# Patient Record
Sex: FEMALE | Race: OTHER | HISPANIC OR LATINO | Employment: UNEMPLOYED | URBAN - METROPOLITAN AREA
[De-identification: names, ages, dates, MRNs, and addresses within clinical notes are randomized per-mention and may not be internally consistent; named-entity substitution may affect disease eponyms.]

---
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
## 2020-01-29 ENCOUNTER — HOSPITAL ENCOUNTER (EMERGENCY)
Facility: HOSPITAL | Age: 20
Discharge: HOME | End: 2020-01-29
Attending: EMERGENCY MEDICINE | Admitting: EMERGENCY MEDICINE
Payer: COMMERCIAL

## 2020-01-29 ENCOUNTER — APPOINTMENT (EMERGENCY)
Dept: CT IMAGING | Facility: HOSPITAL | Age: 20
End: 2020-01-29
Payer: COMMERCIAL

## 2020-01-29 VITALS
DIASTOLIC BLOOD PRESSURE: 66 MMHG | TEMPERATURE: 97 F | SYSTOLIC BLOOD PRESSURE: 123 MMHG | RESPIRATION RATE: 16 BRPM | OXYGEN SATURATION: 99 % | HEART RATE: 119 BPM

## 2020-01-29 DIAGNOSIS — S02.5XXA TOOTH FRACTURE: ICD-10-CM

## 2020-01-29 DIAGNOSIS — S09.90XA INJURY OF HEAD, INITIAL ENCOUNTER: ICD-10-CM
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
## 2020-01-29 PROCEDURE — 90715 TDAP VACCINE 7 YRS/> IM: CPT | Performed by: EMERGENCY MEDICINE

## 2020-01-29 PROCEDURE — 72125 CT NECK SPINE W/O DYE: CPT

## 2020-01-29 PROCEDURE — 70486 CT MAXILLOFACIAL W/O DYE: CPT

## 2020-01-29 PROCEDURE — 99282 EMERGENCY DEPT VISIT SF MDM: CPT | Performed by: EMERGENCY MEDICINE

## 2020-01-29 PROCEDURE — 90471 IMMUNIZATION ADMIN: CPT

## 2020-01-29 PROCEDURE — 99284 EMERGENCY DEPT VISIT MOD MDM: CPT

## 2020-01-29 PROCEDURE — 70450 CT HEAD/BRAIN W/O DYE: CPT

## 2020-01-29 RX ORDER — PANTOPRAZOLE SODIUM 40 MG/1
40 TABLET, DELAYED RELEASE ORAL DAILY
COMMUNITY
Start: 2019-10-11 | End: 2022-02-02 | Stop reason: HOSPADM

## 2020-01-29 RX ADMIN — TETANUS TOXOID, REDUCED DIPHTHERIA TOXOID AND ACELLULAR PERTUSSIS VACCINE, ADSORBED 0.5 ML: 5; 2.5; 8; 8; 2.5 SUSPENSION INTRAMUSCULAR at 12:25

## 2020-01-29 NOTE — ED NOTES
Patient states positive pregnancy test 4 days ago  Informed patient of risks for a CT scan  Patient states "Nicholas Man a hacer un aborto  Eso es porque no me importa si lo hacen " Patient again informed of risks  Provider informed and Gabby Lindo RN  Translation (I am going to get an    That is why I don't care if they do it "      Mari Kong  20 2541

## 2020-01-29 NOTE — ED NOTES
Pt outside of room ambulatory with no assistance   Pt asking for a phone Moshe Saul  01/29/20 [phone]
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
## 2020-01-29 NOTE — ED NOTES
Patient unable to give urine sample  Per provider patient okay to go up for CT        [place]  01/29/20 1808

## 2020-01-29 NOTE — ED NOTES
Patient removed c-collar  Informed patient of risks   States "It bothers me "     Echo Feliciano  01/29/20 0733

## 2020-02-22 ENCOUNTER — HOSPITAL ENCOUNTER (EMERGENCY)
Facility: HOSPITAL | Age: 20
Discharge: HOME | End: 2020-02-22
Attending: EMERGENCY MEDICINE | Admitting: EMERGENCY MEDICINE
Payer: COMMERCIAL

## 2020-02-22 VITALS
HEART RATE: 84 BPM | SYSTOLIC BLOOD PRESSURE: 105 MMHG | WEIGHT: 158.73 LBS | DIASTOLIC BLOOD PRESSURE: 55 MMHG | RESPIRATION RATE: 18 BRPM | TEMPERATURE: 97.7 F | OXYGEN SATURATION: 100 %

## 2020-02-22 DIAGNOSIS — R10.33 PERIUMBILICAL ABDOMINAL PAIN: Primary | ICD-10-CM

## 2020-02-22 DIAGNOSIS — R11.2 NAUSEA & VOMITING: ICD-10-CM

## 2020-02-22 LAB
BACTERIA UR QL AUTO: ABNORMAL /HPF
BILIRUB UR QL STRIP: NEGATIVE
CLARITY UR: CLEAR
COLOR UR: YELLOW
EXT PREG TEST URINE: NORMAL
EXT. CONTROL ED NAV: NORMAL
GLUCOSE UR STRIP-MCNC: NEGATIVE MG/DL
HGB UR QL STRIP.AUTO: ABNORMAL
KETONES UR STRIP-MCNC: NEGATIVE MG/DL
LEUKOCYTE ESTERASE UR QL STRIP: NEGATIVE
MUCOUS THREADS UR QL AUTO: ABNORMAL
NITRITE UR QL STRIP: NEGATIVE
NON-SQ EPI CELLS URNS QL MICRO: ABNORMAL /HPF
PH UR STRIP.AUTO: 7 [PH] (ref 4.5–8)
PROT UR STRIP-MCNC: ABNORMAL MG/DL
RBC #/AREA URNS AUTO: ABNORMAL /HPF
SP GR UR STRIP.AUTO: 1.02 (ref 1–1.03)
UROBILINOGEN UR QL STRIP.AUTO: 1 E.U./DL
WBC #/AREA URNS AUTO: ABNORMAL /HPF

## 2020-02-22 PROCEDURE — 96372 THER/PROPH/DIAG INJ SC/IM: CPT

## 2020-02-22 PROCEDURE — 81001 URINALYSIS AUTO W/SCOPE: CPT

## 2020-02-22 PROCEDURE — 99283 EMERGENCY DEPT VISIT LOW MDM: CPT

## 2020-02-22 PROCEDURE — 99284 EMERGENCY DEPT VISIT MOD MDM: CPT | Performed by: EMERGENCY MEDICINE

## 2020-02-22 PROCEDURE — 87086 URINE CULTURE/COLONY COUNT: CPT | Performed by: EMERGENCY MEDICINE

## 2020-02-22 PROCEDURE — 87147 CULTURE TYPE IMMUNOLOGIC: CPT | Performed by: EMERGENCY MEDICINE

## 2020-02-22 PROCEDURE — 81003 URINALYSIS AUTO W/O SCOPE: CPT

## 2020-02-22 PROCEDURE — 81025 URINE PREGNANCY TEST: CPT | Performed by: EMERGENCY MEDICINE

## 2020-02-22 RX ORDER — ONDANSETRON 4 MG/1
4 TABLET, ORALLY DISINTEGRATING ORAL EVERY 8 HOURS PRN
Qty: 20 TABLET | Refills: 0 | Status: SHIPPED | OUTPATIENT
Start: 2020-02-22 | End: 2022-02-02 | Stop reason: HOSPADM

## 2020-02-22 RX ORDER — KETOROLAC TROMETHAMINE 30 MG/ML
15 INJECTION, SOLUTION INTRAMUSCULAR; INTRAVENOUS ONCE
Status: COMPLETED | OUTPATIENT
Start: 2020-02-22 | End: 2020-02-22

## 2020-02-22 RX ORDER — HYOSCYAMINE SULFATE 0.125 MG
0.12 TABLET ORAL EVERY 4 HOURS PRN
Qty: 30 TABLET | Refills: 0 | Status: SHIPPED | OUTPATIENT
Start: 2020-02-22

## 2020-02-22 RX ORDER — ONDANSETRON 4 MG/1
4 TABLET, ORALLY DISINTEGRATING ORAL ONCE
Status: COMPLETED | OUTPATIENT
Start: 2020-02-22 | End: 2020-02-22

## 2020-02-22 RX ADMIN — ONDANSETRON 4 MG: 4 TABLET, ORALLY DISINTEGRATING ORAL at 22:54

## 2020-02-22 RX ADMIN — KETOROLAC TROMETHAMINE 15 MG: 30 INJECTION, SOLUTION INTRAMUSCULAR at 22:54

## 2020-02-23 NOTE — ED NOTES
Pt  Stated that she has 10 out of 10 pain in her abdomen, is nauseous, and is asking for pain meds  However, pt  Appears to be in no distress, and was laughing with visitor in room        Aarti Max  02/22/20 0978

## 2020-02-23 NOTE — ED PROVIDER NOTES
History  Chief Complaint   Patient presents with    Stress     "i have two full time jobs, to much stress", reports weakness worsening today and "i cant stop throwing up", also reports mid abdominal pain    Vomiting     Pt is a 21year old female with a PMH of HIV presenting with abdominal pain and vomiting  Pt states she works two jobs and "has too much stress"  States over the past day she has had periumbilical/epigastric stabbing abdominal pain that is intermittent and non-radiating  Associated vomiting but denies diarrhea, urinary symptoms, vaginal bleeding or discharge, back or flank pain  Denies fevers, lightheadedness, dizziness, chest pain, SOB  History of cholecystectomy  No sick contact or recent travel  Prior to Admission Medications   Prescriptions Last Dose Informant Patient Reported? Taking?   bictegravir-emtricitab-tenofovir alafenamide (BIKTARVY) -25 MG tablet Not Taking at Unknown time  Yes No   Sig: Take 1 tablet by mouth daily   elvitegravir-cobicistat-emtricitabine-tenofovir alafenamide (GENVOYA) tablet Not Taking at Unknown time  Yes No   Sig: Take 1 tablet by mouth daily    pantoprazole (PROTONIX) 40 mg tablet Not Taking at Unknown time  Yes No   Sig: Take 40 mg by mouth daily      Facility-Administered Medications: None       Past Medical History:   Diagnosis Date    [facility] ([facility] )        Past Surgical History:   Procedure Laterality Date     SECTION      CHOLECYSTECTOMY         History reviewed  No pertinent family history  I have reviewed and agree with the history as documented  Social History     Tobacco Use    Smoking status: Never Smoker    Smokeless tobacco: Never Used   Substance Use Topics    Alcohol use: No    Drug use: Yes     Types: Marijuana       Review of Systems   Constitutional: Negative  HENT: Negative  Respiratory: Negative  Cardiovascular: Negative      Gastrointestinal: Positive for abdominal pain, nausea and vomiting  Negative for abdominal distention, blood in stool, constipation and diarrhea  Genitourinary: Negative  Musculoskeletal: Negative  Neurological: Negative  All other systems reviewed and are negative  Physical Exam  Physical Exam   Constitutional: She is oriented to person, place, and time  She appears well-developed and well-nourished  No distress  HENT:   Head: Normocephalic and atraumatic  Right Ear: External ear normal    Left Ear: External ear normal    Nose: Nose normal    Eyes: Conjunctivae and EOM are normal    Neck: Normal range of motion  Neck supple  Cardiovascular: Normal rate, regular rhythm, normal heart sounds and intact distal pulses  Pulmonary/Chest: Effort normal and breath sounds normal    Abdominal: Soft  Bowel sounds are normal  She exhibits no distension  There is tenderness in the epigastric area and periumbilical area  There is no rigidity, no rebound and no guarding  Musculoskeletal: Normal range of motion  Neurological: She is alert and oriented to person, place, and time  Skin: Skin is warm and dry  She is not diaphoretic         Vital Signs  ED Triage Vitals [02/22/20 2211]   Temperature Pulse Respirations Blood Pressure SpO2   97 7 °F (36 5 °C) (!) 114 18 105/55 100 %      Temp src Heart Rate Source Patient Position - Orthostatic VS BP Location FiO2 (%)   -- Monitor Sitting Right arm --      Pain Score       4           Vitals:    02/22/20 2211 02/22/20 2313   BP: 105/55    Pulse: (!) 114 84   Patient Position - Orthostatic VS: Sitting          Visual Acuity      ED Medications  Medications   ketorolac (TORADOL) injection 15 mg (15 mg Intramuscular Given 2/22/20 2254)   ondansetron (ZOFRAN-ODT) dispersible tablet 4 mg (4 mg Oral Given 2/22/20 2254)       Diagnostic Studies  Results Reviewed     Procedure Component Value Units Date/Time    Urine culture [[id number]]     Lab Status:  No result Specimen:  Urine     Urine Microscopic [[id number]]  (Abnormal) Collected:  02/22/20 2245    Lab Status:  Final result Specimen:  Urine, Clean Catch Updated:  02/22/20 2309     RBC, UA 10-20 /hpf      WBC, UA 1-2 /hpf      Epithelial Cells Occasional /hpf      Bacteria, UA Occasional /hpf      MUCUS THREADS Occasional    POCT pregnancy, urine [[id number]]  (Normal) Resulted:  02/22/20 2246    Lab Status:  Final result Updated:  02/22/20 2249     EXT PREG TEST UR (Ref: Negative) Negative (-)     Control Valid    POCT urinalysis dipstick [[id number]]  (Abnormal) Resulted:  02/22/20 2246    Lab Status:  Final result Specimen:  Urine Updated:  02/22/20 2248    Urine Macroscopic, POC [[id number]]  (Abnormal) Collected:  02/22/20 2245    Lab Status:  Final result Specimen:  Urine Updated:  02/22/20 2246     Color, UA Yellow     Clarity, UA Clear     pH, UA 7 0     Leukocytes, UA Negative     Nitrite, UA Negative     Protein, UA 30 (1+) mg/dl      Glucose, UA Negative mg/dl      Ketones, UA Negative mg/dl      Urobilinogen, UA 1 0 E U /dl      Bilirubin, UA Negative     Blood, UA Moderate     Specific Gravity, UA 1 025    Narrative:       CLINITEK RESULT                 No orders to display              Procedures  Procedures         ED Course                               MDM  Number of Diagnoses or Management Options  Nausea & vomiting:   Periumbilical abdominal pain:   Diagnosis management comments: Based on history and exam, I do not suspect acute abdomen  Given return precautions for symptoms including appendicitis precautions  Pt had relief with medications  Follow up as needed          Disposition  Final diagnoses:   Periumbilical abdominal pain   Nausea & vomiting     Time reflects when diagnosis was documented in both MDM as applicable and the Disposition within this note     Time User Action Codes Description Comment    2/22/2020 11:12 PM Mohinder Mandel Add [H25 77] Periumbilical abdominal pain     2/22/2020 11:12 PM Mildred RICE Add [R11 2] Nausea & vomiting       ED Disposition     ED Disposition Condition Date/Time Comment    Discharge Good Sat Feb 22, 2020 11:12 PM Anatoliy Leo discharge to home/self care  Follow-up Information     Follow up With Specialties Details Why Contact Info Additional [address] Schedule an appointment as soon as possible for a visit  As needed [address], [address] [facility] [address]  [address], [address], [address] [place], [place], [address]          Discharge Medication List as of 2/22/2020 11:13 PM      START taking these medications    Details   hyoscyamine (ANASPAZ,LEVSIN) 0 125 MG tablet Take 1 tablet (0 125 mg total) by mouth every 4 (four) hours as needed for cramping, Starting Sat 2/22/2020, Print      ondansetron (ZOFRAN-ODT) 4 mg disintegrating tablet Take 1 tablet (4 mg total) by mouth every 8 (eight) hours as needed for nausea, Starting Sat 2/22/2020, Print         CONTINUE these medications which have NOT CHANGED    Details   bictegravir-emtricitab-tenofovir alafenamide (BIKTARVY) -25 MG tablet Take 1 tablet by mouth daily, Starting Fri 10/11/2019, Historical Med      elvitegravir-cobicistat-emtricitabine-tenofovir alafenamide (GENVOYA) tablet Take 1 tablet by mouth daily , Starting Mon 12/31/2018, Historical Med      pantoprazole (PROTONIX) 40 mg tablet Take 40 mg by mouth daily, Starting Fri 10/11/2019, Until Sat 10/10/2020, Historical Med           No discharge procedures on file      PDMP Review     None          ED Provider  Electronically Signed by           Jayashree Clark PA-C  02/23/20 9791

## 2020-02-25 LAB
BACTERIA UR CULT: ABNORMAL
BACTERIA UR CULT: ABNORMAL

## 2020-08-01 ENCOUNTER — HOSPITAL ENCOUNTER (EMERGENCY)
Facility: HOSPITAL | Age: 20
Discharge: LEFT BEFORE DISCHARGE | End: 2020-08-01
Attending: EMERGENCY MEDICINE | Admitting: EMERGENCY MEDICINE
Payer: COMMERCIAL

## 2020-08-01 VITALS
OXYGEN SATURATION: 99 % | WEIGHT: 184.75 LBS | SYSTOLIC BLOOD PRESSURE: 128 MMHG | RESPIRATION RATE: 20 BRPM | TEMPERATURE: 100.3 F | HEART RATE: 109 BPM | DIASTOLIC BLOOD PRESSURE: 76 MMHG
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
## 2020-08-01 LAB
EXT PREG TEST URINE: NEGATIVE
EXT. CONTROL ED NAV: NORMAL

## 2020-08-01 PROCEDURE — 99284 EMERGENCY DEPT VISIT MOD MDM: CPT

## 2020-08-01 PROCEDURE — 81025 URINE PREGNANCY TEST: CPT | Performed by: PHYSICIAN ASSISTANT

## 2020-08-01 PROCEDURE — 99282 EMERGENCY DEPT VISIT SF MDM: CPT | Performed by: PHYSICIAN ASSISTANT

## 2020-08-01 RX ORDER — AMOXICILLIN AND CLAVULANATE POTASSIUM 875; 125 MG/1; MG/1
1 TABLET, FILM COATED ORAL ONCE
Status: DISCONTINUED | OUTPATIENT
Start: 2020-08-01 | End: 2020-08-02 | Stop reason: HOSPADM
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
## 2021-01-02 ENCOUNTER — APPOINTMENT (EMERGENCY)
Dept: ULTRASOUND IMAGING | Facility: HOSPITAL | Age: 21
End: 2021-01-02
Payer: COMMERCIAL

## 2021-01-02 ENCOUNTER — HOSPITAL ENCOUNTER (EMERGENCY)
Facility: HOSPITAL | Age: 21
Discharge: HOME | End: 2021-01-03
Attending: EMERGENCY MEDICINE | Admitting: EMERGENCY MEDICINE
Payer: COMMERCIAL

## 2021-01-02 DIAGNOSIS — O20.0 THREATENED ABORTION: Primary | ICD-10-CM

## 2021-01-02 LAB
ABO GROUP BLD: NORMAL
ALBUMIN SERPL BCP-MCNC: 4.8 G/DL (ref 3–5.2)
ALP SERPL-CCNC: 71 U/L (ref 43–122)
ALT SERPL W P-5'-P-CCNC: 21 U/L (ref 9–52)
ANION GAP SERPL CALCULATED.3IONS-SCNC: 13 MMOL/L (ref 5–14)
AST SERPL W P-5'-P-CCNC: 31 U/L (ref 14–36)
B-HCG SERPL-ACNC: 629.6 MIU/ML
BACTERIA UR QL AUTO: ABNORMAL /HPF
BASOPHILS # BLD AUTO: 0 THOUSANDS/ΜL (ref 0–0.1)
BASOPHILS NFR BLD AUTO: 0 % (ref 0–1)
BILIRUB SERPL-MCNC: 0.5 MG/DL
BILIRUB UR QL STRIP: NEGATIVE
BUN SERPL-MCNC: 9 MG/DL (ref 5–25)
CALCIUM SERPL-MCNC: 9.2 MG/DL (ref 8.4–10.2)
CHLORIDE SERPL-SCNC: 106 MMOL/L (ref 97–108)
CLARITY UR: ABNORMAL
CO2 SERPL-SCNC: 26 MMOL/L (ref 22–30)
COLOR UR: YELLOW
CREAT SERPL-MCNC: 0.75 MG/DL (ref 0.6–1.2)
EOSINOPHIL # BLD AUTO: 0.1 THOUSAND/ΜL (ref 0–0.4)
EOSINOPHIL NFR BLD AUTO: 1 % (ref 0–6)
ERYTHROCYTE [DISTWIDTH] IN BLOOD BY AUTOMATED COUNT: 13.5 %
EXT PREG TEST URINE: POSITIVE
EXT. CONTROL ED NAV: NORMAL
GFR SERPL CREATININE-BSD FRML MDRD: 115 ML/MIN/1.73SQ M
GLUCOSE SERPL-MCNC: 119 MG/DL (ref 70–99)
GLUCOSE UR STRIP-MCNC: NEGATIVE MG/DL
HCT VFR BLD AUTO: 36.7 % (ref 36–46)
HGB BLD-MCNC: 12.1 G/DL (ref 12–16)
HGB UR QL STRIP.AUTO: 250
KETONES UR STRIP-MCNC: NEGATIVE MG/DL
LEUKOCYTE ESTERASE UR QL STRIP: 25
LYMPHOCYTES # BLD AUTO: 3 THOUSANDS/ΜL (ref 0.5–4)
LYMPHOCYTES NFR BLD AUTO: 27 % (ref 25–45)
MCH RBC QN AUTO: 27.3 PG (ref 26–34)
MCHC RBC AUTO-ENTMCNC: 33.1 G/DL (ref 31–36)
MCV RBC AUTO: 83 FL (ref 80–100)
MONOCYTES # BLD AUTO: 0.5 THOUSAND/ΜL (ref 0.2–0.9)
MONOCYTES NFR BLD AUTO: 4 % (ref 1–10)
MUCOUS THREADS UR QL AUTO: ABNORMAL
NEUTROPHILS # BLD AUTO: 7.6 THOUSANDS/ΜL (ref 1.8–7.8)
NEUTS SEG NFR BLD AUTO: 68 % (ref 45–65)
NITRITE UR QL STRIP: NEGATIVE
NON-SQ EPI CELLS URNS QL MICRO: ABNORMAL /HPF
PH UR STRIP.AUTO: 5 [PH]
PLATELET # BLD AUTO: 335 THOUSANDS/UL (ref 150–450)
PMV BLD AUTO: 7.9 FL (ref 8.9–12.7)
POTASSIUM SERPL-SCNC: 3.8 MMOL/L (ref 3.6–5)
PROT SERPL-MCNC: 8.8 G/DL (ref 5.9–8.4)
PROT UR STRIP-MCNC: ABNORMAL MG/DL
RBC # BLD AUTO: 4.43 MILLION/UL (ref 4–5.2)
RBC #/AREA URNS AUTO: ABNORMAL /HPF
RH BLD: POSITIVE
SODIUM SERPL-SCNC: 145 MMOL/L (ref 137–147)
SP GR UR STRIP.AUTO: 1.03 (ref 1–1.04)
UROBILINOGEN UA: NEGATIVE MG/DL
WBC # BLD AUTO: 11.2 THOUSAND/UL (ref 4.5–11)
WBC #/AREA URNS AUTO: ABNORMAL /HPF

## 2021-01-02 PROCEDURE — 81025 URINE PREGNANCY TEST: CPT | Performed by: PHYSICIAN ASSISTANT

## 2021-01-02 PROCEDURE — 80053 COMPREHEN METABOLIC PANEL: CPT | Performed by: PHYSICIAN ASSISTANT

## 2021-01-02 PROCEDURE — 86900 BLOOD TYPING SEROLOGIC ABO: CPT | Performed by: PHYSICIAN ASSISTANT

## 2021-01-02 PROCEDURE — 96374 THER/PROPH/DIAG INJ IV PUSH: CPT

## 2021-01-02 PROCEDURE — 99284 EMERGENCY DEPT VISIT MOD MDM: CPT

## 2021-01-02 PROCEDURE — 85025 COMPLETE CBC W/AUTO DIFF WBC: CPT | Performed by: PHYSICIAN ASSISTANT

## 2021-01-02 PROCEDURE — 99284 EMERGENCY DEPT VISIT MOD MDM: CPT | Performed by: PHYSICIAN ASSISTANT

## 2021-01-02 PROCEDURE — 81001 URINALYSIS AUTO W/SCOPE: CPT | Performed by: PHYSICIAN ASSISTANT

## 2021-01-02 PROCEDURE — 86901 BLOOD TYPING SEROLOGIC RH(D): CPT | Performed by: PHYSICIAN ASSISTANT

## 2021-01-02 PROCEDURE — 76815 OB US LIMITED FETUS(S): CPT

## 2021-01-02 PROCEDURE — 84702 CHORIONIC GONADOTROPIN TEST: CPT | Performed by: PHYSICIAN ASSISTANT

## 2021-01-02 PROCEDURE — 36415 COLL VENOUS BLD VENIPUNCTURE: CPT | Performed by: PHYSICIAN ASSISTANT

## 2021-01-02 PROCEDURE — 96361 HYDRATE IV INFUSION ADD-ON: CPT

## 2021-01-02 RX ORDER — METOCLOPRAMIDE HYDROCHLORIDE 5 MG/ML
5 INJECTION INTRAMUSCULAR; INTRAVENOUS ONCE
Status: COMPLETED | OUTPATIENT
Start: 2021-01-02 | End: 2021-01-02

## 2021-01-02 RX ORDER — ACETAMINOPHEN 325 MG/1
650 TABLET ORAL ONCE
Status: COMPLETED | OUTPATIENT
Start: 2021-01-02 | End: 2021-01-02

## 2021-01-02 RX ADMIN — ACETAMINOPHEN 650 MG: 325 TABLET ORAL at 21:01

## 2021-01-02 RX ADMIN — SODIUM CHLORIDE 1000 ML: 0.9 INJECTION, SOLUTION INTRAVENOUS at 20:46

## 2021-01-02 RX ADMIN — METOCLOPRAMIDE 5 MG: 5 INJECTION, SOLUTION INTRAMUSCULAR; INTRAVENOUS at 21:02

## 2021-01-03 VITALS
HEART RATE: 94 BPM | SYSTOLIC BLOOD PRESSURE: 116 MMHG | RESPIRATION RATE: 18 BRPM | DIASTOLIC BLOOD PRESSURE: 69 MMHG | TEMPERATURE: 97.4 F | WEIGHT: 176.13 LBS | OXYGEN SATURATION: 100 %

## 2021-01-03 RX ORDER — ACETAMINOPHEN 500 MG
500 TABLET ORAL EVERY 6 HOURS PRN
Qty: 30 TABLET | Refills: 0 | Status: SHIPPED | OUTPATIENT
Start: 2021-01-03 | End: 2022-02-02 | Stop reason: HOSPADM

## 2021-01-03 NOTE — ED NOTES
Pt rang call bell to alert this RN that the IV catheter became dislodge  Pt will remain without an IV till after Ultrasound  Provider aware        Val Negro, RN  01/02/21 0477

## 2021-01-03 NOTE — ED NOTES
Translation via Ipad was used and the pt denied having any more questions       Melissa Tanner RN  01/02/21 4488

## 2021-01-03 NOTE — DISCHARGE INSTRUCTIONS
Use Tylenol for pain  Return for new worsening complaints  Follow-up with the lab in 2 days for repeat blood work  You do not have to return to the emergency department for repeat blood work  Follow-up with OBGYN

## 2021-04-15 ENCOUNTER — HOSPITAL ENCOUNTER (EMERGENCY)
Facility: HOSPITAL | Age: 21
Discharge: HOME | End: 2021-04-15
Attending: EMERGENCY MEDICINE | Admitting: EMERGENCY MEDICINE
Payer: COMMERCIAL

## 2021-04-15 VITALS
WEIGHT: 161 LBS | HEART RATE: 87 BPM | TEMPERATURE: 98.7 F | RESPIRATION RATE: 18 BRPM | SYSTOLIC BLOOD PRESSURE: 118 MMHG | DIASTOLIC BLOOD PRESSURE: 68 MMHG | OXYGEN SATURATION: 100 %

## 2021-04-15 DIAGNOSIS — N39.0 UTI (URINARY TRACT INFECTION): Primary | ICD-10-CM

## 2021-04-15 DIAGNOSIS — Z32.02 NEGATIVE PREGNANCY TEST: ICD-10-CM

## 2021-04-15 LAB
ALBUMIN SERPL BCP-MCNC: 4.8 G/DL (ref 3–5.2)
ALP SERPL-CCNC: 82 U/L (ref 43–122)
ALT SERPL W P-5'-P-CCNC: 22 U/L
ANION GAP SERPL CALCULATED.3IONS-SCNC: 10 MMOL/L (ref 5–14)
AST SERPL W P-5'-P-CCNC: 30 U/L (ref 14–36)
BACTERIA UR QL AUTO: ABNORMAL /HPF
BASOPHILS # BLD AUTO: 0.1 THOUSANDS/ΜL (ref 0–0.1)
BASOPHILS NFR BLD AUTO: 1 % (ref 0–1)
BILIRUB SERPL-MCNC: 0.56 MG/DL
BILIRUB UR QL STRIP: NEGATIVE
BUN SERPL-MCNC: 11 MG/DL (ref 5–25)
CALCIUM SERPL-MCNC: 10.1 MG/DL (ref 8.4–10.2)
CHLORIDE SERPL-SCNC: 101 MMOL/L (ref 97–108)
CLARITY UR: ABNORMAL
CO2 SERPL-SCNC: 30 MMOL/L (ref 22–30)
COLOR UR: YELLOW
CREAT SERPL-MCNC: 0.71 MG/DL (ref 0.6–1.2)
EOSINOPHIL # BLD AUTO: 0 THOUSAND/ΜL (ref 0–0.4)
EOSINOPHIL NFR BLD AUTO: 0 % (ref 0–6)
ERYTHROCYTE [DISTWIDTH] IN BLOOD BY AUTOMATED COUNT: 14.2 %
EXT PREG TEST URINE: NEGATIVE
EXT. CONTROL ED NAV: NORMAL
GFR SERPL CREATININE-BSD FRML MDRD: 122 ML/MIN/1.73SQ M
GLUCOSE SERPL-MCNC: 94 MG/DL (ref 70–99)
GLUCOSE UR STRIP-MCNC: NEGATIVE MG/DL
HCT VFR BLD AUTO: 41.9 % (ref 36–46)
HGB BLD-MCNC: 14 G/DL (ref 12–16)
HGB UR QL STRIP.AUTO: 150
KETONES UR STRIP-MCNC: NEGATIVE MG/DL
LEUKOCYTE ESTERASE UR QL STRIP: 100
LIPASE SERPL-CCNC: 109 U/L (ref 23–300)
LYMPHOCYTES # BLD AUTO: 3.4 THOUSANDS/ΜL (ref 0.5–4)
LYMPHOCYTES NFR BLD AUTO: 37 % (ref 25–45)
MCH RBC QN AUTO: 27.2 PG (ref 26–34)
MCHC RBC AUTO-ENTMCNC: 33.3 G/DL (ref 31–36)
MCV RBC AUTO: 82 FL (ref 80–100)
MONOCYTES # BLD AUTO: 0.5 THOUSAND/ΜL (ref 0.2–0.9)
MONOCYTES NFR BLD AUTO: 5 % (ref 1–10)
MUCOUS THREADS UR QL AUTO: ABNORMAL
NEUTROPHILS # BLD AUTO: 5.2 THOUSANDS/ΜL (ref 1.8–7.8)
NEUTS SEG NFR BLD AUTO: 57 % (ref 45–65)
NITRITE UR QL STRIP: NEGATIVE
NON-SQ EPI CELLS URNS QL MICRO: ABNORMAL /HPF
PH UR STRIP.AUTO: 6 [PH]
PLATELET # BLD AUTO: 330 THOUSANDS/UL (ref 150–450)
PMV BLD AUTO: 8.5 FL (ref 8.9–12.7)
POTASSIUM SERPL-SCNC: 3.6 MMOL/L (ref 3.6–5)
PROT SERPL-MCNC: 8.8 G/DL (ref 5.9–8.4)
PROT UR STRIP-MCNC: ABNORMAL MG/DL
RBC # BLD AUTO: 5.13 MILLION/UL (ref 4–5.2)
RBC #/AREA URNS AUTO: ABNORMAL /HPF
SODIUM SERPL-SCNC: 141 MMOL/L (ref 137–147)
SP GR UR STRIP.AUTO: 1.02 (ref 1–1.04)
UROBILINOGEN UA: NEGATIVE MG/DL
WBC # BLD AUTO: 9.2 THOUSAND/UL (ref 4.5–11)
WBC #/AREA URNS AUTO: ABNORMAL /HPF

## 2021-04-15 PROCEDURE — 36415 COLL VENOUS BLD VENIPUNCTURE: CPT | Performed by: PHYSICIAN ASSISTANT

## 2021-04-15 PROCEDURE — 87077 CULTURE AEROBIC IDENTIFY: CPT | Performed by: PHYSICIAN ASSISTANT

## 2021-04-15 PROCEDURE — 81003 URINALYSIS AUTO W/O SCOPE: CPT | Performed by: PHYSICIAN ASSISTANT

## 2021-04-15 PROCEDURE — 81025 URINE PREGNANCY TEST: CPT | Performed by: PHYSICIAN ASSISTANT

## 2021-04-15 PROCEDURE — 99284 EMERGENCY DEPT VISIT MOD MDM: CPT

## 2021-04-15 PROCEDURE — 87147 CULTURE TYPE IMMUNOLOGIC: CPT | Performed by: PHYSICIAN ASSISTANT

## 2021-04-15 PROCEDURE — 96374 THER/PROPH/DIAG INJ IV PUSH: CPT

## 2021-04-15 PROCEDURE — 81001 URINALYSIS AUTO W/SCOPE: CPT | Performed by: PHYSICIAN ASSISTANT

## 2021-04-15 PROCEDURE — 99284 EMERGENCY DEPT VISIT MOD MDM: CPT | Performed by: PHYSICIAN ASSISTANT

## 2021-04-15 PROCEDURE — 80053 COMPREHEN METABOLIC PANEL: CPT | Performed by: PHYSICIAN ASSISTANT

## 2021-04-15 PROCEDURE — 83690 ASSAY OF LIPASE: CPT | Performed by: PHYSICIAN ASSISTANT

## 2021-04-15 PROCEDURE — 87186 SC STD MICRODIL/AGAR DIL: CPT | Performed by: PHYSICIAN ASSISTANT

## 2021-04-15 PROCEDURE — 96375 TX/PRO/DX INJ NEW DRUG ADDON: CPT

## 2021-04-15 PROCEDURE — 85025 COMPLETE CBC W/AUTO DIFF WBC: CPT | Performed by: PHYSICIAN ASSISTANT

## 2021-04-15 PROCEDURE — 96361 HYDRATE IV INFUSION ADD-ON: CPT

## 2021-04-15 PROCEDURE — 87086 URINE CULTURE/COLONY COUNT: CPT | Performed by: PHYSICIAN ASSISTANT

## 2021-04-15 RX ORDER — KETOROLAC TROMETHAMINE 30 MG/ML
15 INJECTION, SOLUTION INTRAMUSCULAR; INTRAVENOUS ONCE
Status: COMPLETED | OUTPATIENT
Start: 2021-04-15 | End: 2021-04-15

## 2021-04-15 RX ORDER — CEPHALEXIN 500 MG/1
500 CAPSULE ORAL EVERY 12 HOURS SCHEDULED
Qty: 14 CAPSULE | Refills: 0 | Status: SHIPPED | OUTPATIENT
Start: 2021-04-15 | End: 2021-04-22

## 2021-04-15 RX ORDER — ONDANSETRON 2 MG/ML
4 INJECTION INTRAMUSCULAR; INTRAVENOUS ONCE
Status: COMPLETED | OUTPATIENT
Start: 2021-04-15 | End: 2021-04-15

## 2021-04-15 RX ADMIN — SODIUM CHLORIDE 1000 ML: 0.9 INJECTION, SOLUTION INTRAVENOUS at 01:39

## 2021-04-15 RX ADMIN — ONDANSETRON 4 MG: 2 INJECTION INTRAMUSCULAR; INTRAVENOUS at 01:44

## 2021-04-15 RX ADMIN — KETOROLAC TROMETHAMINE 15 MG: 30 INJECTION, SOLUTION INTRAMUSCULAR; INTRAVENOUS at 01:44

## 2021-04-15 NOTE — ED PROVIDER NOTES
History  Chief Complaint   Patient presents with    Abdominal Pain     pt c/o lower abd pain with nausea x 3 days  denies taking anything for symptoms  unsure if she is pregnant      Patient presents for evaluation of lower abdominal pain x3 days  Admits to 1 episode of vomiting  Denies any urinary symptoms, vaginal discharge  States her last menstrual period was 3 weeks ago  She did have a miscarriage from last and she was seen here in 2021  She denies any concern for STD  Pain is crampy in localized to lower abdomen without radiation  No other symptoms or complaints  Prior to Admission Medications   Prescriptions Last Dose Informant Patient Reported? Taking?   acetaminophen (TYLENOL) 500 mg tablet   No No   Sig: Take 1 tablet (500 mg total) by mouth every 6 (six) hours as needed for mild pain   bictegravir-emtricitab-tenofovir alafenamide (BIKTARVY) -25 MG tablet   Yes No   Sig: Take 1 tablet by mouth daily   elvitegravir-cobicistat-emtricitabine-tenofovir alafenamide (GENVOYA) tablet   Yes No   Sig: Take 1 tablet by mouth daily    hyoscyamine (ANASPAZ,LEVSIN) 0 125 MG tablet   No No   Sig: Take 1 tablet (0 125 mg total) by mouth every 4 (four) hours as needed for cramping   ondansetron (ZOFRAN-ODT) 4 mg disintegrating tablet   No No   Sig: Take 1 tablet (4 mg total) by mouth every 8 (eight) hours as needed for nausea   pantoprazole (PROTONIX) 40 mg tablet   Yes No   Sig: Take 40 mg by mouth daily      Facility-Administered Medications: None       Past Medical History:   Diagnosis Date    HIV (human immunodeficiency virus infection) ([facility] [address] )        Past Surgical History:   Procedure Laterality Date     SECTION      CHOLECYSTECTOMY         History reviewed  No pertinent family history  I have reviewed and agree with the history as documented      E-Cigarette/Vaping    E-Cigarette Use Never User      E-Cigarette/Vaping Substances     Social History     Tobacco Use    Smoking status: Never Smoker    Smokeless tobacco: Never Used   Substance Use Topics    Alcohol use: Yes     Frequency: 4 or more times a week     Drinks per session: 10 or more     Binge frequency: Daily or almost daily     Comment: Pt reports that she drinks 3-4 bottles of Leonela per day  Pt states that the size of the bottle is 1/5    Drug use: Yes     Types: Marijuana       Review of Systems   Constitutional: Negative for chills and fever  HENT: Negative for congestion, ear pain and sore throat  Eyes: Negative for pain  Respiratory: Negative for cough and shortness of breath  Cardiovascular: Negative for chest pain  Gastrointestinal: Positive for abdominal pain, nausea and vomiting  Genitourinary: Negative for dysuria, frequency, vaginal bleeding and vaginal discharge  Musculoskeletal: Negative for back pain  Skin: Negative for rash  Neurological: Negative for dizziness and numbness  Psychiatric/Behavioral: Negative for suicidal ideas  All other systems reviewed and are negative  Physical Exam  Physical Exam  Vitals signs reviewed  Constitutional:       Appearance: She is well-developed  HENT:      Head: Normocephalic and atraumatic  Right Ear: External ear normal       Left Ear: External ear normal       Nose: Nose normal    Neck:      Musculoskeletal: Normal range of motion and neck supple  Cardiovascular:      Rate and Rhythm: Normal rate and regular rhythm  Heart sounds: Normal heart sounds  Pulmonary:      Effort: Pulmonary effort is normal       Breath sounds: Normal breath sounds  Abdominal:      General: Bowel sounds are normal       Palpations: Abdomen is soft  Tenderness: There is no abdominal tenderness  There is no guarding  Comments: Completely benign abdominal exam   Musculoskeletal: Normal range of motion  Skin:     General: Skin is warm and dry  Capillary Refill: Capillary refill takes less than 2 seconds     Neurological: Mental Status: She is alert and oriented to person, place, and time     Psychiatric:         Behavior: Behavior normal          Vital Signs  ED Triage Vitals [04/15/21 0117]   Temperature Pulse Respirations Blood Pressure SpO2   98 7 °F (37 1 °C) 98 17 127/77 100 %      Temp Source Heart Rate Source Patient Position - Orthostatic VS BP Location FiO2 (%)   Tympanic Monitor Sitting Left arm --      Pain Score       8           Vitals:    04/15/21 0117   BP: 127/77   Pulse: 98   Patient Position - Orthostatic VS: Sitting         Visual Acuity      ED Medications  Medications   sodium chloride 0 9 % bolus 1,000 mL (1,000 mL Intravenous New Bag 4/15/21 0139)   ondansetron (ZOFRAN) injection 4 mg (4 mg Intravenous Given 4/15/21 0144)   ketorolac (TORADOL) injection 15 mg (15 mg Intravenous Given 4/15/21 0144)       Diagnostic Studies  Results Reviewed     Procedure Component Value Units Date/Time    Comprehensive metabolic panel [[id number]]  (Abnormal) Collected: 04/15/21 0138    Lab Status: Final result Specimen: Blood from Arm, Right Updated: 04/15/21 0158     Sodium 141 mmol/L      Potassium 3 6 mmol/L      Chloride 101 mmol/L      CO2 30 mmol/L      ANION GAP 10 mmol/L      BUN 11 mg/dL      Creatinine 0 71 mg/dL      Glucose 94 mg/dL      Calcium 10 1 mg/dL      AST 30 U/L      ALT 22 U/L      Alkaline Phosphatase 82 U/L      Total Protein 8 8 g/dL      Albumin 4 8 g/dL      Total Bilirubin 0 56 mg/dL      eGFR 122 ml/min/1 73sq m     Narrative:      Rylee guidelines for Chronic Kidney Disease (CKD):     Stage 1 with normal or high GFR (GFR > 90 mL/min/1 73 square meters)    Stage 2 Mild CKD (GFR = 60-89 mL/min/1 73 square meters)    Stage 3A Moderate CKD (GFR = 45-59 mL/min/1 73 square meters)    Stage 3B Moderate CKD (GFR = 30-44 mL/min/1 73 square meters)    Stage 4 Severe CKD (GFR = 15-29 mL/min/1 73 square meters)    Stage 5 End Stage CKD (GFR <15 mL/min/1 73 square meters)  Note: GFR calculation is accurate only with a steady state creatinine    Lipase [[id number]]  (Normal) Collected: 04/15/21 0138    Lab Status: Final result Specimen: Blood from Arm, Right Updated: 04/15/21 0158     Lipase 109 u/L     Urine Microscopic [[id number]]  (Abnormal) Collected: 04/15/21 0138    Lab Status: Final result Specimen: Urine, Clean Catch Updated: 04/15/21 0156     RBC, UA 4-10 /hpf      WBC, UA 30-50 /hpf      Epithelial Cells Moderate /hpf      Bacteria, UA Innumerable /hpf      MUCUS THREADS Occasional    Urine culture [[id number]] Collected: 04/15/21 0138    Lab Status:  In process Specimen: Urine, Clean Catch Updated: 04/15/21 0156    CBC and differential [[id number]]  (Abnormal) Collected: 04/15/21 0138    Lab Status: Final result Specimen: Blood from Arm, Right Updated: 04/15/21 0153     WBC 9 20 Thousand/uL      RBC 5 13 Million/uL      Hemoglobin 14 0 g/dL      Hematocrit 41 9 %      MCV 82 fL      MCH 27 2 pg      MCHC 33 3 g/dL      RDW 14 2 %      MPV 8 5 fL      Platelets 241 Thousands/uL      Neutrophils Relative 57 %      Lymphocytes Relative 37 %      Monocytes Relative 5 %      Eosinophils Relative 0 %      Basophils Relative 1 %      Neutrophils Absolute 5 20 Thousands/µL      Lymphocytes Absolute 3 40 Thousands/µL      Monocytes Absolute 0 50 Thousand/µL      Eosinophils Absolute 0 00 Thousand/µL      Basophils Absolute 0 10 Thousands/µL     UA w Reflex to Microscopic w Reflex to Culture [[id number]]  (Abnormal) Collected: 04/15/21 0138    Lab Status: Final result Specimen: Urine, Clean Catch Updated: 04/15/21 0150     Color, UA Yellow     Clarity, UA Slightly Cloudy     Specific Gravity, UA 1 025     pH, UA 6 0     Leukocytes,  0     Nitrite, UA Negative     Protein, UA 15 (Trace) mg/dl      Glucose, UA Negative mg/dl      Ketones, UA Negative mg/dl      Bilirubin, UA Negative     Blood,  0     UROBILINOGEN UA Negative mg/dL     POCT pregnancy, urine [[id number]] (Normal) Resulted: 04/15/21 0139    Lab Status: Final result Updated: 04/15/21 0139     EXT PREG TEST UR (Ref: Negative) negative     Control valid                 No orders to display              Procedures  Procedures         ED Course  ED Course as of Apr 15 0206   Thu Apr 15, 2021   0141 Of note, nursing reporting to me patient admitted to only being here for pregnancy test after nurse completed POCT preg test                                SBIRT 20yo+      Most Recent Value   SBIRT (25 yo +)   In order to provide better care to our patients, we are screening all of our patients for alcohol and drug use  Would it be okay to ask you these screening questions? Yes Filed at: 04/15/2021 0120   Initial Alcohol Screen: US AUDIT-C    1  How often do you have a drink containing alcohol?  0 Filed at: 04/15/2021 0120   2  How many drinks containing alcohol do you have on a typical day you are drinking? 0 Filed at: 04/15/2021 0120   3b  FEMALE Any Age, or MALE 65+: How often do you have 4 or more drinks on one occassion? 0 Filed at: 04/15/2021 0120   Audit-C Score  0 Filed at: 04/15/2021 0120   PUMA: How many times in the past year have you    Used an illegal drug or used a prescription medication for non-medical reasons? Never Filed at: 04/15/2021 0120                    MDM  Number of Diagnoses or Management Options  Negative pregnancy test:   UTI (urinary tract infection):   Diagnosis management comments: UTI noted on UA  Negative preg  Instructed to follow up with OBGYN  Patient agreeable  Patient verbalizes understanding and agrees with plan  The management plan was discussed in detail with the patient at bedside and all questions were answered  Prior to discharge, I provided both verbal and written instructions  I discussed with the patient the signs and symptoms for which to return to the emergency department    All questions were answered and patient was comfortable with the plan of care and discharged to home  The patient agrees to return to the Emergency Department for concerns and/or progression of illness  Disposition  Final diagnoses:   UTI (urinary tract infection)   Negative pregnancy test     Time reflects when diagnosis was documented in both MDM as applicable and the Disposition within this note     Time User Action Codes Description Comment    4/15/2021  2:00 AM Lory Montoya Add [N39 0] UTI (urinary tract infection)     4/15/2021  2:01 AM Lory Montoya Add [Z32 02] Negative pregnancy test       ED Disposition     ED Disposition Condition Date/Time Comment    Discharge Stable Thu Apr 15, 2021  2:00 AM Anatoliy Leo discharge to home/self care  Follow-up Information     Follow up With Specialties Details Why Contact Info Additional [address] [facility]   [address]  [address] [place] [address], [address] [facility] [phone]  [address], [address] [place] [address] [address], [address] [place], [place], [address]          Patient's Medications   Discharge Prescriptions    CEPHALEXIN (KEFLEX) 500 MG CAPSULE    Take 1 capsule (500 mg total) by mouth every 12 (twelve) hours for 7 days       Start Date: 4/15/2021 End Date: 4/22/2021       Order Dose: 500 mg       Quantity: 14 capsule    Refills: 0     No discharge procedures on file      PDMP Review     None          ED Provider  Electronically Signed by           Agnes Gutiérrez PA-C  04/15/21 0206

## 2021-04-15 NOTE — DISCHARGE INSTRUCTIONS
Please follow up with your family doctor  If you do not have one, I have provided you with a referral  I have also provided you with a referral for OBGYN  Use medication as prescribed  Please return to the ER with any worsening symptoms

## 2021-04-15 NOTE — ED NOTES
Patient reports that her pain and nausea are gone and that she is feeling better       Annetta Isabel RN  04/15/21 1683

## 2021-04-17 LAB
BACTERIA UR CULT: ABNORMAL

## 2021-05-17 ENCOUNTER — HOSPITAL ENCOUNTER (EMERGENCY)
Facility: HOSPITAL | Age: 21
Discharge: HOME | End: 2021-05-17
Attending: EMERGENCY MEDICINE
Payer: COMMERCIAL

## 2021-05-17 VITALS
SYSTOLIC BLOOD PRESSURE: 121 MMHG | TEMPERATURE: 98.3 F | DIASTOLIC BLOOD PRESSURE: 72 MMHG | RESPIRATION RATE: 16 BRPM | OXYGEN SATURATION: 100 % | HEART RATE: 94 BPM | WEIGHT: 161 LBS

## 2021-05-17 DIAGNOSIS — J06.9 URI (UPPER RESPIRATORY INFECTION): ICD-10-CM

## 2021-05-17 DIAGNOSIS — J02.9 PHARYNGITIS: Primary | ICD-10-CM

## 2021-05-17 LAB — SARS-COV-2 RNA RESP QL NAA+PROBE: NEGATIVE

## 2021-05-17 PROCEDURE — U0005 INFEC AGEN DETEC AMPLI PROBE: HCPCS | Performed by: EMERGENCY MEDICINE

## 2021-05-17 PROCEDURE — U0003 INFECTIOUS AGENT DETECTION BY NUCLEIC ACID (DNA OR RNA); SEVERE ACUTE RESPIRATORY SYNDROME CORONAVIRUS 2 (SARS-COV-2) (CORONAVIRUS DISEASE [COVID-19]), AMPLIFIED PROBE TECHNIQUE, MAKING USE OF HIGH THROUGHPUT TECHNOLOGIES AS DESCRIBED BY CMS-2020-01-R: HCPCS | Performed by: EMERGENCY MEDICINE

## 2021-05-17 PROCEDURE — 96372 THER/PROPH/DIAG INJ SC/IM: CPT

## 2021-05-17 PROCEDURE — 99283 EMERGENCY DEPT VISIT LOW MDM: CPT

## 2021-05-17 PROCEDURE — 99284 EMERGENCY DEPT VISIT MOD MDM: CPT | Performed by: EMERGENCY MEDICINE

## 2021-05-17 RX ORDER — DEXAMETHASONE SODIUM PHOSPHATE 4 MG/ML
10 INJECTION, SOLUTION INTRA-ARTICULAR; INTRALESIONAL; INTRAMUSCULAR; INTRAVENOUS; SOFT TISSUE ONCE
Status: COMPLETED | OUTPATIENT
Start: 2021-05-17 | End: 2021-05-17

## 2021-05-17 RX ORDER — FLUTICASONE PROPIONATE 50 MCG
1 SPRAY, SUSPENSION (ML) NASAL DAILY
Qty: 16 G | Refills: 0 | Status: SHIPPED | OUTPATIENT
Start: 2021-05-17

## 2021-05-17 RX ORDER — LORATADINE 10 MG/1
10 TABLET ORAL DAILY
Qty: 20 TABLET | Refills: 0 | Status: SHIPPED | OUTPATIENT
Start: 2021-05-17

## 2021-05-17 RX ADMIN — DEXAMETHASONE SODIUM PHOSPHATE 10 MG: 4 INJECTION, SOLUTION INTRAMUSCULAR; INTRAVENOUS at 16:59

## 2021-05-17 NOTE — ED PROVIDER NOTES
History  Chief Complaint   Patient presents with    Sore Throat     x2 days       25 yo female with a history of HIV presents to the ED complaining of URI symptoms x several days  The patient reports a sore throat, rhinorrhea, nasal congestion, and a mild nonproductive cough  No earache or sinus pain/pressure  She denies fevers/chills  No chest pain or shortness of breath  No identifiable sick contacts  No other specific complaints  Prior to Admission Medications   Prescriptions Last Dose Informant Patient Reported? Taking?   acetaminophen (TYLENOL) 500 mg tablet   No No   Sig: Take 1 tablet (500 mg total) by mouth every 6 (six) hours as needed for mild pain   bictegravir-emtricitab-tenofovir alafenamide (BIKTARVY) -25 MG tablet   Yes No   Sig: Take 1 tablet by mouth daily   elvitegravir-cobicistat-emtricitabine-tenofovir alafenamide (GENVOYA) tablet   Yes No   Sig: Take 1 tablet by mouth daily    hyoscyamine (ANASPAZ,LEVSIN) 0 125 MG tablet   No No   Sig: Take 1 tablet (0 125 mg total) by mouth every 4 (four) hours as needed for cramping   ondansetron (ZOFRAN-ODT) 4 mg disintegrating tablet   No No   Sig: Take 1 tablet (4 mg total) by mouth every 8 (eight) hours as needed for nausea   pantoprazole (PROTONIX) 40 mg tablet   Yes No   Sig: Take 40 mg by mouth daily      Facility-Administered Medications: None       Past Medical History:   Diagnosis Date    HIV (human immunodeficiency virus infection) ([facility] [address] )        Past Surgical History:   Procedure Laterality Date     SECTION      CHOLECYSTECTOMY         History reviewed  No pertinent family history  I have reviewed and agree with the history as documented      E-Cigarette/Vaping    E-Cigarette Use Never User      E-Cigarette/Vaping Substances     Social History     Tobacco Use    Smoking status: Never Smoker    Smokeless tobacco: Never Used   Substance Use Topics    Alcohol use: Yes     Frequency: 4 or more times a week     Drinks per session: 10 or more     Binge frequency: Daily or almost daily     Comment: Pt reports that she drinks 3-4 bottles of Leonela per day  Pt states that the size of the bottle is 1/5    Drug use: Yes     Types: Marijuana       Review of Systems   Constitutional: Negative for chills and fever  HENT: Positive for congestion, rhinorrhea and sore throat  Eyes: Negative for visual disturbance  Respiratory: Positive for cough  Negative for shortness of breath  Cardiovascular: Negative for chest pain  Gastrointestinal: Negative for abdominal pain, diarrhea, nausea and vomiting  Endocrine: Negative for cold intolerance and heat intolerance  Genitourinary: Negative for dysuria and frequency  Musculoskeletal: Negative for back pain  Skin: Negative for rash  Allergic/Immunologic: Negative for immunocompromised state  Neurological: Negative for dizziness, weakness, light-headedness, numbness and headaches  Hematological: Negative for adenopathy  Psychiatric/Behavioral: Negative for self-injury  Physical Exam  Physical Exam  Constitutional:       General: She is not in acute distress  Appearance: She is well-developed  HENT:      Head: Normocephalic and atraumatic  Nose:      Right Sinus: No maxillary sinus tenderness or frontal sinus tenderness  Left Sinus: No maxillary sinus tenderness or frontal sinus tenderness  Mouth/Throat:      Pharynx: Oropharynx is clear  No pharyngeal swelling or posterior oropharyngeal erythema  Tonsils: No tonsillar exudate  Eyes:      Pupils: Pupils are equal, round, and reactive to light  Neck:      Musculoskeletal: Normal range of motion and neck supple  Cardiovascular:      Rate and Rhythm: Normal rate and regular rhythm  Pulmonary:      Effort: Pulmonary effort is normal       Breath sounds: Normal breath sounds  Abdominal:      General: There is no distension  Palpations: Abdomen is soft  Tenderness:  There is no abdominal tenderness  Musculoskeletal: Normal range of motion  Lymphadenopathy:      Cervical: No cervical adenopathy  Skin:     General: Skin is warm and dry  Neurological:      Mental Status: She is alert and oriented to person, place, and time  Vital Signs  ED Triage Vitals [05/17/21 1612]   Temperature Pulse Respirations Blood Pressure SpO2   98 3 °F (36 8 °C) 94 16 121/72 100 %      Temp Source Heart Rate Source Patient Position - Orthostatic VS BP Location FiO2 (%)   Tympanic Monitor Sitting Left arm --      Pain Score       --           Vitals:    05/17/21 1612   BP: 121/72   Pulse: 94   Patient Position - Orthostatic VS: Sitting         Visual Acuity      ED Medications  Medications   dexamethasone (DECADRON) injection 10 mg (10 mg Intramuscular Given 5/17/21 1659)       Diagnostic Studies  Results Reviewed     Procedure Component Value Units Date/Time    Novel Coronavirus (Covid-19),PCR [facility] - 24 Hour Routine [[id number]]  (Normal) Collected: 05/17/21 1700    Lab Status: Final result Specimen: Nares from Nasopharyngeal Swab Updated: 05/17/21 1816     SARS-CoV-2 Negative    Narrative: The specimen collection materials, transport medium, and/or testing methodology utilized in the production of these test results have been proven to be reliable in a limited validation with an abbreviated program under the Emergency Utilization Authorization provided by the FDA  Testing reported as "Presumptive positive" will be confirmed with secondary testing to ensure result accuracy  Clinical caution and judgement should be used with the interpretation of these results with consideration of the clinical impression and other laboratory testing  Testing reported as "Positive" or "Negative" has been proven to be accurate according to standard laboratory validation requirements  All testing is performed with control materials showing appropriate reactivity at standard intervals                     No orders to display              Procedures  Procedures         ED Course                             SBIRT 20yo+      Most Recent Value   SBIRT (24 yo +)   In order to provide better care to our patients, we are screening all of our patients for alcohol and drug use  Would it be okay to ask you these screening questions? No Filed at: 05/17/2021 1636                    [facility]  Number of Diagnoses or Management Options  Pharyngitis:   URI (upper respiratory infection):   Diagnosis management comments: The patient is very well appearing with stable vital signs and a benign exam  Symptoms are most consistent with a viral URI +/- seasonal allergies  0/4 Cent[facility] Criteria on exam  Plan for supportive care with IM Decadron, a daily antihistamine, and Flonase  COVID swab sent to the lab  The patient was instructed to quarantine until the result becomes available  She is agreeable to this plan  Strict return precautions provided  Amount and/or Complexity of Data Reviewed  Clinical lab tests: ordered    Patient Progress  Patient progress: stable      Disposition  Final diagnoses:   Pharyngitis   URI (upper respiratory infection)     Time reflects when diagnosis was documented in both MDM as applicable and the Disposition within this note     Time User Action Codes Description Comment    5/17/2021  4:41 PM Nabor Berry Add [J02 9] Pharyngitis     5/17/2021  4:41 PM Bryson Billingsley Add [J06 9] URI (upper respiratory infection)       ED Disposition     ED Disposition Condition Date/Time Comment    Discharge Stable Mon May 17, 2021  4:41 PM Anatoliy Bailey discharge to home/self care              Follow-up Information     Follow up With Specialties Details Why Contact Info Additional [facility] Schedule an appointment as soon as possible for a visit   [address] [place], [address] [facility] [address]  [address] [place] [address] [address], [address] [place] [address], [address] [place], [place], [address]          Discharge Medication List as of 5/17/2021  4:42 PM      START taking these medications    Details   fluticasone (FLONASE) 50 mcg/act nasal spray 1 spray into each nostril daily, Starting Mon 5/17/2021, Print      loratadine (CLARITIN) 10 mg tablet Take 1 tablet (10 mg total) by mouth daily, Starting Mon 5/17/2021, Print         CONTINUE these medications which have NOT CHANGED    Details   acetaminophen (TYLENOL) 500 mg tablet Take 1 tablet (500 mg total) by mouth every 6 (six) hours as needed for mild pain, Starting Sun 1/3/2021, Print      bictegravir-emtricitab-tenofovir alafenamide (BIKTARVY) -25 MG tablet Take 1 tablet by mouth daily, Starting Fri 10/11/2019, Historical Med      elvitegravir-cobicistat-emtricitabine-tenofovir alafenamide (GENVOYA) tablet Take 1 tablet by mouth daily , Starting Mon 12/31/2018, Historical Med      hyoscyamine (ANASPAZ,LEVSIN) 0 125 MG tablet Take 1 tablet (0 125 mg total) by mouth every 4 (four) hours as needed for cramping, Starting Sat 2/22/2020, Print      ondansetron (ZOFRAN-ODT) 4 mg disintegrating tablet Take 1 tablet (4 mg total) by mouth every 8 (eight) hours as needed for nausea, Starting Sat 2/22/2020, Print      pantoprazole (PROTONIX) 40 mg tablet Take 40 mg by mouth daily, Starting Fri 10/11/2019, Until Sat 10/10/2020, Historical Med           No discharge procedures on file      PDMP Review     None          ED Provider  Electronically Signed by           Remedios Davis MD  05/17/21 9615

## 2021-05-28 ENCOUNTER — HOSPITAL ENCOUNTER (EMERGENCY)
Facility: HOSPITAL | Age: 21
Discharge: HOME | End: 2021-05-28
Attending: EMERGENCY MEDICINE
Payer: COMMERCIAL

## 2021-05-28 VITALS
OXYGEN SATURATION: 100 % | TEMPERATURE: 97.6 F | RESPIRATION RATE: 14 BRPM | DIASTOLIC BLOOD PRESSURE: 81 MMHG | HEART RATE: 79 BPM | SYSTOLIC BLOOD PRESSURE: 129 MMHG | WEIGHT: 155.1 LBS

## 2021-05-28 DIAGNOSIS — R11.2 NAUSEA & VOMITING: Primary | ICD-10-CM

## 2021-05-28 DIAGNOSIS — N39.0 UTI (URINARY TRACT INFECTION): ICD-10-CM

## 2021-05-28 LAB
ALBUMIN SERPL BCP-MCNC: 4.6 G/DL (ref 3–5.2)
ALP SERPL-CCNC: 71 U/L (ref 43–122)
ALT SERPL W P-5'-P-CCNC: 30 U/L
AMPHETAMINES SERPL QL SCN: NEGATIVE
ANION GAP SERPL CALCULATED.3IONS-SCNC: 12 MMOL/L (ref 5–14)
AST SERPL W P-5'-P-CCNC: 35 U/L (ref 14–36)
BACTERIA UR QL AUTO: ABNORMAL /HPF
BARBITURATES UR QL: NEGATIVE
BENZODIAZ UR QL: NEGATIVE
BILIRUB SERPL-MCNC: 0.61 MG/DL
BILIRUB UR QL STRIP: NEGATIVE
BUN SERPL-MCNC: 9 MG/DL (ref 5–25)
CALCIUM SERPL-MCNC: 9.6 MG/DL (ref 8.4–10.2)
CHLORIDE SERPL-SCNC: 105 MMOL/L (ref 97–108)
CLARITY UR: ABNORMAL
CO2 SERPL-SCNC: 27 MMOL/L (ref 22–30)
COCAINE UR QL: NEGATIVE
COLOR UR: ABNORMAL
CREAT SERPL-MCNC: 0.85 MG/DL (ref 0.6–1.2)
EOSINOPHIL # BLD AUTO: 0.09 THOUSAND/UL (ref 0–0.4)
EOSINOPHIL NFR BLD MANUAL: 1 % (ref 0–6)
ERYTHROCYTE [DISTWIDTH] IN BLOOD BY AUTOMATED COUNT: 14.3 %
EXT PREG TEST URINE: NEGATIVE
EXT. CONTROL ED NAV: NORMAL
GFR SERPL CREATININE-BSD FRML MDRD: 98 ML/MIN/1.73SQ M
GLUCOSE SERPL-MCNC: 100 MG/DL (ref 70–99)
GLUCOSE UR STRIP-MCNC: NEGATIVE MG/DL
HCT VFR BLD AUTO: 40.9 % (ref 36–46)
HGB BLD-MCNC: 13.9 G/DL (ref 12–16)
HGB UR QL STRIP.AUTO: 150
KETONES UR STRIP-MCNC: ABNORMAL MG/DL
LEUKOCYTE ESTERASE UR QL STRIP: 25
LIPASE SERPL-CCNC: 84 U/L (ref 23–300)
LYMPHOCYTES # BLD AUTO: 2.75 THOUSAND/UL (ref 0.5–4)
LYMPHOCYTES # BLD AUTO: 32 % (ref 25–45)
MCH RBC QN AUTO: 27.4 PG (ref 26–34)
MCHC RBC AUTO-ENTMCNC: 34.1 G/DL (ref 31–36)
MCV RBC AUTO: 81 FL (ref 80–100)
METHADONE UR QL: NEGATIVE
MONOCYTES # BLD AUTO: 0.17 THOUSAND/UL (ref 0.2–0.9)
MONOCYTES NFR BLD AUTO: 2 % (ref 1–10)
MUCOUS THREADS UR QL AUTO: ABNORMAL
NEUTS BAND NFR BLD MANUAL: 1 % (ref 0–8)
NEUTS SEG # BLD: 5.59 THOUSAND/UL (ref 1.8–7.8)
NEUTS SEG NFR BLD AUTO: 64 %
NITRITE UR QL STRIP: NEGATIVE
NON-SQ EPI CELLS URNS QL MICRO: ABNORMAL /HPF
OPIATES UR QL SCN: NEGATIVE
OXYCODONE+OXYMORPHONE UR QL SCN: NEGATIVE
PCP UR QL: NEGATIVE
PH UR STRIP.AUTO: 6 [PH]
PLATELET # BLD AUTO: 259 THOUSANDS/UL (ref 150–450)
PLATELET BLD QL SMEAR: ADEQUATE
PMV BLD AUTO: 8.4 FL (ref 8.9–12.7)
POTASSIUM SERPL-SCNC: 3.6 MMOL/L (ref 3.6–5)
PROT SERPL-MCNC: 8.7 G/DL (ref 5.9–8.4)
PROT UR STRIP-MCNC: ABNORMAL MG/DL
RBC # BLD AUTO: 5.08 MILLION/UL (ref 4–5.2)
RBC #/AREA URNS AUTO: ABNORMAL /HPF
RBC MORPH BLD: NORMAL
SODIUM SERPL-SCNC: 144 MMOL/L (ref 137–147)
SP GR UR STRIP.AUTO: 1.02 (ref 1–1.04)
THC UR QL: NEGATIVE
TOTAL CELLS COUNTED SPEC: 100
URINE COMMENT: ABNORMAL
UROBILINOGEN UA: 1 MG/DL
WBC # BLD AUTO: 8.6 THOUSAND/UL (ref 4.5–11)
WBC #/AREA URNS AUTO: ABNORMAL /HPF

## 2021-05-28 PROCEDURE — 85027 COMPLETE CBC AUTOMATED: CPT | Performed by: PHYSICIAN ASSISTANT

## 2021-05-28 PROCEDURE — 96361 HYDRATE IV INFUSION ADD-ON: CPT

## 2021-05-28 PROCEDURE — 96374 THER/PROPH/DIAG INJ IV PUSH: CPT

## 2021-05-28 PROCEDURE — 99284 EMERGENCY DEPT VISIT MOD MDM: CPT | Performed by: PHYSICIAN ASSISTANT

## 2021-05-28 PROCEDURE — 81001 URINALYSIS AUTO W/SCOPE: CPT | Performed by: PHYSICIAN ASSISTANT

## 2021-05-28 PROCEDURE — 80307 DRUG TEST PRSMV CHEM ANLYZR: CPT | Performed by: PHYSICIAN ASSISTANT

## 2021-05-28 PROCEDURE — 80053 COMPREHEN METABOLIC PANEL: CPT | Performed by: PHYSICIAN ASSISTANT

## 2021-05-28 PROCEDURE — 99283 EMERGENCY DEPT VISIT LOW MDM: CPT

## 2021-05-28 PROCEDURE — 85007 BL SMEAR W/DIFF WBC COUNT: CPT | Performed by: PHYSICIAN ASSISTANT

## 2021-05-28 PROCEDURE — 81025 URINE PREGNANCY TEST: CPT | Performed by: PHYSICIAN ASSISTANT

## 2021-05-28 PROCEDURE — 36415 COLL VENOUS BLD VENIPUNCTURE: CPT | Performed by: PHYSICIAN ASSISTANT

## 2021-05-28 PROCEDURE — 81003 URINALYSIS AUTO W/O SCOPE: CPT | Performed by: PHYSICIAN ASSISTANT

## 2021-05-28 PROCEDURE — 83690 ASSAY OF LIPASE: CPT | Performed by: PHYSICIAN ASSISTANT

## 2021-05-28 PROCEDURE — 96375 TX/PRO/DX INJ NEW DRUG ADDON: CPT

## 2021-05-28 RX ORDER — ONDANSETRON 4 MG/1
4 TABLET, ORALLY DISINTEGRATING ORAL EVERY 6 HOURS PRN
Qty: 20 TABLET | Refills: 0 | Status: SHIPPED | OUTPATIENT
Start: 2021-05-28 | End: 2022-02-02 | Stop reason: HOSPADM

## 2021-05-28 RX ORDER — METOCLOPRAMIDE HYDROCHLORIDE 5 MG/ML
10 INJECTION INTRAMUSCULAR; INTRAVENOUS ONCE
Status: COMPLETED | OUTPATIENT
Start: 2021-05-28 | End: 2021-05-28

## 2021-05-28 RX ORDER — NITROFURANTOIN 25; 75 MG/1; MG/1
100 CAPSULE ORAL 2 TIMES DAILY
Qty: 10 CAPSULE | Refills: 0 | Status: SHIPPED | OUTPATIENT
Start: 2021-05-28 | End: 2022-02-02 | Stop reason: HOSPADM

## 2021-05-28 RX ADMIN — METOCLOPRAMIDE 10 MG: 5 INJECTION, SOLUTION INTRAMUSCULAR; INTRAVENOUS at 09:35

## 2021-05-28 RX ADMIN — FAMOTIDINE 20 MG: 10 INJECTION INTRAVENOUS at 09:36

## 2021-05-28 RX ADMIN — SODIUM CHLORIDE 1000 ML: 0.9 INJECTION, SOLUTION INTRAVENOUS at 09:35

## 2021-05-28 NOTE — ED NOTES
Pt tolerating ice chips, ambulated to BR and back without problem, gait steady     Isaias Ordoñez, DM  05/28/21 1285

## 2021-05-28 NOTE — ED PROVIDER NOTES
History  Chief Complaint   Patient presents with    Vomiting     began 0600, pt admits to drinking yesterday  pt denies diarrhea/fevers/cough     59-year-old female with past medical history of alcohol abuse GERD, gastritis, and HIV last CD4, absolute count on 651, on 2021 presents to the ED for evaluation of nausea, vomiting, and epigastric tenderness x 3 hours  States that last night she drank 12 beers, stopped at midnight, and woke up at 6 a m  this morning to onset of symptoms  Reports over 10 episodes of NBNB vomittng  Patient states she typically drinks more than 12 beers on occasion, and typically does not wake up to nausea vomiting and epigastric tenderness  Patient states she typically drinks only on the weekend  Patient states emesis is nonbloody-non bilious  Denies any fever, chills, diaphoresis, chest pain, dyspnea, diarrhea, constipation, dysuria, hematuria and flank pain  First day of last menstrual cycle 2021  Patient denies any vaginal bleeding vaginal discharge  Patient denies any other drug use  History provided by:  Patient   used: No    Vomiting  Associated symptoms: abdominal pain    Associated symptoms: no chills, no cough, no diarrhea, no fever, no headaches, no myalgias and no sore throat        Prior to Admission Medications   Prescriptions Last Dose Informant Patient Reported?  Taking?   acetaminophen (TYLENOL) 500 mg tablet   No No   Sig: Take 1 tablet (500 mg total) by mouth every 6 (six) hours as needed for mild pain   bictegravir-emtricitab-tenofovir alafenamide (BIKTARVY) -25 MG tablet   Yes No   Sig: Take 1 tablet by mouth daily   elvitegravir-cobicistat-emtricitabine-tenofovir alafenamide (GENVOYA) tablet   Yes No   Sig: Take 1 tablet by mouth daily    fluticasone (FLONASE) 50 mcg/act nasal spray   No No   Si spray into each nostril daily   hyoscyamine (ANASPAZ,LEVSIN) 0 125 MG tablet Not Taking at Unknown time  No No   Sig: Take 1 tablet (0 125 mg total) by mouth every 4 (four) hours as needed for cramping   Patient not taking: Reported on 2021   loratadine (CLARITIN) 10 mg tablet   No No   Sig: Take 1 tablet (10 mg total) by mouth daily   ondansetron (ZOFRAN-ODT) 4 mg disintegrating tablet Not Taking at Unknown time  No No   Sig: Take 1 tablet (4 mg total) by mouth every 8 (eight) hours as needed for nausea   Patient not taking: Reported on 2021   pantoprazole (PROTONIX) 40 mg tablet   Yes No   Sig: Take 40 mg by mouth daily      Facility-Administered Medications: None       Past Medical History:   Diagnosis Date    HIV (human immunodeficiency virus infection) ([facility] [address] )        Past Surgical History:   Procedure Laterality Date     SECTION      CHOLECYSTECTOMY         History reviewed  No pertinent family history  I have reviewed and agree with the history as documented  E-Cigarette/Vaping    E-Cigarette Use Never User      E-Cigarette/Vaping Substances     Social History     Tobacco Use    Smoking status: Never Smoker    Smokeless tobacco: Never Used   Substance Use Topics    Alcohol use: Yes     Frequency: 4 or more times a week     Drinks per session: 10 or more     Binge frequency: Daily or almost daily     Comment: Pt reports that she drinks 3-4 bottles of Leonela per day  Pt states that the size of the bottle is 1/5    Drug use: Yes     Types: Marijuana       Review of Systems   Constitutional: Negative for chills, diaphoresis, fatigue and fever  HENT: Negative for congestion, rhinorrhea and sore throat  Eyes: Negative for photophobia and visual disturbance  Respiratory: Negative for cough and shortness of breath  Cardiovascular: Negative for chest pain and palpitations  Gastrointestinal: Positive for abdominal pain, nausea and vomiting  Negative for blood in stool, constipation, diarrhea and rectal pain  Genitourinary: Negative for difficulty urinating, dysuria, hematuria and urgency  Musculoskeletal: Negative for back pain, gait problem, myalgias, neck pain and neck stiffness  Skin: Negative for color change, rash and wound  Neurological: Negative for dizziness, syncope, weakness, light-headedness, numbness and headaches  Hematological: Negative for adenopathy  Psychiatric/Behavioral: Negative for behavioral problems  Physical Exam    Physical Exam  Vitals signs and nursing note reviewed  Constitutional:       General: She is not in acute distress  Appearance: Normal appearance  She is well-developed and normal weight  She is not ill-appearing, toxic-appearing or diaphoretic  HENT:      Head: Normocephalic and atraumatic  Right Ear: Tympanic membrane, ear canal and external ear normal       Left Ear: Tympanic membrane, ear canal and external ear normal       Nose: Nose normal  No congestion or rhinorrhea  Mouth/Throat:      Mouth: Mucous membranes are moist       Pharynx: Oropharynx is clear  No oropharyngeal exudate or posterior oropharyngeal erythema  Eyes:      General: No scleral icterus  Right eye: No discharge  Left eye: No discharge  Extraocular Movements: Extraocular movements intact  Conjunctiva/sclera: Conjunctivae normal       Pupils: Pupils are equal, round, and reactive to light  Neck:      Musculoskeletal: Normal range of motion  No neck rigidity or muscular tenderness  Cardiovascular:      Rate and Rhythm: Normal rate and regular rhythm  Pulses: Normal pulses  Heart sounds: Normal heart sounds  No murmur  Pulmonary:      Effort: Pulmonary effort is normal  No respiratory distress  Breath sounds: Normal breath sounds  No wheezing  Abdominal:      General: Bowel sounds are normal  There is no distension  Palpations: Abdomen is soft  There is no mass  Tenderness: There is abdominal tenderness (Mild epigastric to deep palpation)   There is no right CVA tenderness, left CVA tenderness, guarding or rebound  Hernia: No hernia is present  Musculoskeletal: Normal range of motion  General: No deformity or signs of injury  Right lower leg: No edema  Left lower leg: No edema  Lymphadenopathy:      Cervical: No cervical adenopathy  Skin:     General: Skin is warm and dry  Capillary Refill: Capillary refill takes less than 2 seconds  Coloration: Skin is not jaundiced or pale  Neurological:      Mental Status: She is alert and oriented to person, place, and time  Motor: No weakness  Gait: Gait normal    Psychiatric:         Behavior: Behavior normal          Vital Signs  ED Triage Vitals [05/28/21 0904]   Temperature Pulse Respirations Blood Pressure SpO2   97 6 °F (36 4 °C) 97 20 144/87 99 %      Temp Source Heart Rate Source Patient Position - Orthostatic VS BP Location FiO2 (%)   Tympanic Monitor Sitting Left arm --      Pain Score       --           Vitals:    05/28/21 0904 05/28/21 1124   BP: 144/87 129/81   Pulse: 97 79   Patient Position - Orthostatic VS: Sitting Lying         Visual Acuity      ED Medications  Medications   sodium chloride 0 9 % bolus 1,000 mL (0 mL Intravenous Stopped 5/28/21 1108)   metoclopramide (REGLAN) injection 10 mg (10 mg Intravenous Given 5/28/21 0935)   famotidine (PEPCID) injection 20 mg (20 mg Intravenous Given 5/28/21 0936)       Diagnostic Studies  Results Reviewed     Procedure Component Value Units Date/Time    Urine Microscopic [[id number]]  (Abnormal) Collected: 05/28/21 0933    Lab Status: Final result Specimen: Urine, Clean Catch Updated: 05/28/21 1145     RBC, UA 2-4 /hpf      WBC, UA 4-10 /hpf      Epithelial Cells Moderate /hpf      Bacteria, UA Innumerable /hpf      MUCUS THREADS Moderate     URINE COMMENT Concentrated microscopic performed on low volume urine      Manual Differential (Non Wam) [[id number]]  (Abnormal) Collected: 05/28/21 0933    Lab Status: Final result Specimen: Blood from Arm, Right Updated: 05/28/21 1138     Segmented % 64 %      Bands % 1 %      Lymphocytes % 32 %      Monocytes % 2 %      Eosinophils, % 1 %      Neutrophils Absolute 5 59 Thousand/uL      Lymphocytes Absolute 2 75 Thousand/uL      Monocytes Absolute 0 17 Thousand/uL      Eosinophils Absolute 0 09 Thousand/uL      Total Counted 100     RBC Morphology Normal     Platelet Estimate Adequate    Rapid drug screen, urine [[id number]]  (Normal) Collected: 05/28/21 0932    Lab Status: Final result Specimen: Urine, Catheter Updated: 05/28/21 1105     Amph/Meth UR Negative     Barbiturate Ur Negative     Benzodiazepine Urine Negative     Cocaine Urine Negative     Methadone Urine Negative     Opiate Urine Negative     PCP Ur Negative     THC Urine Negative     Oxycodone Urine Negative    Narrative:      FOR MEDICAL PURPOSES ONLY  IF CONFIRMATION NEEDED PLEASE CONTACT THE LAB WITHIN 5 DAYS      Drug Screen Cutoff Levels:  AMPHETAMINE/METHAMPHETAMINES  1000 ng/mL  BARBITURATES     200 ng/mL  BENZODIAZEPINES     200 ng/mL  COCAINE      300 ng/mL  METHADONE      300 ng/mL  OPIATES      300 ng/mL  PHENCYCLIDINE     25 ng/mL  THC       50 ng/mL  OXYCODONE      100 ng/mL    UA w Reflex to Microscopic w Reflex to Culture [[id number]]  (Abnormal) Collected: 05/28/21 0933    Lab Status: Final result Specimen: Urine, Clean Catch Updated: 05/28/21 1054     Color, UA Carmen     Clarity, UA Cloudy     Specific Dougherty, UA 1 020     pH, UA 6 0     Leukocytes, UA 25 0     Nitrite, UA Negative     Protein, UA 30 (1+) mg/dl      Glucose, UA Negative mg/dl      Ketones, UA 5 (Trace) mg/dl      Bilirubin, UA Negative     Blood,  0     UROBILINOGEN UA 1 0 mg/dL     Lipase [[id number]]  (Normal) Collected: 05/28/21 0933    Lab Status: Final result Specimen: Blood from Arm, Right Updated: 05/28/21 1047     Lipase 84 u/L     Comprehensive metabolic panel [[id number]]  (Abnormal) Collected: 05/28/21 0933    Lab Status: Final result Specimen: Blood from Arm, Right Updated: 05/28/21 1047     Sodium 144 mmol/L      Potassium 3 6 mmol/L      Chloride 105 mmol/L      CO2 27 mmol/L      ANION GAP 12 mmol/L      BUN 9 mg/dL      Creatinine 0 85 mg/dL      Glucose 100 mg/dL      Calcium 9 6 mg/dL      AST 35 U/L      ALT 30 U/L      Alkaline Phosphatase 71 U/L      Total Protein 8 7 g/dL      Albumin 4 6 g/dL      Total Bilirubin 0 61 mg/dL      eGFR 98 ml/min/1 73sq m     Narrative:      National Kid[employer] Disease Foundation guidelines for Chronic Kidney Disease (CKD):     Stage 1 with normal or high GFR (GFR > 90 mL/min/1 73 square meters)    Stage 2 Mild CKD (GFR = 60-89 mL/min/1 73 square meters)    Stage 3A Moderate CKD (GFR = 45-59 mL/min/1 73 square meters)    Stage 3B Moderate CKD (GFR = 30-44 mL/min/1 73 square meters)    Stage 4 Severe CKD (GFR = 15-29 mL/min/1 73 square meters)    Stage 5 End Stage CKD (GFR <15 mL/min/1 73 square meters)  Note: GFR calculation is accurate only with a steady state creatinine    CBC and differential [[id number]]  (Abnormal) Collected: 05/28/21 0933    Lab Status: Final result Specimen: Blood from Arm, Right Updated: 05/28/21 1027     WBC 8 60 Thousand/uL      RBC 5 08 Million/uL      Hemoglobin 13 9 g/dL      Hematocrit 40 9 %      MCV 81 fL      MCH 27 4 pg      MCHC 34 1 g/dL      RDW 14 3 %      MPV 8 4 fL      Platelets 577 Thousands/uL     POCT pregnancy, urine [[id number]]  (Normal) Resulted: 05/28/21 0933    Lab Status: Final result Updated: 05/28/21 0933     EXT PREG TEST UR (Ref: Negative) negative     Control valid                 No orders to display              Procedures  Procedures         ED Course  ED Course as of May 28 1159   Fri May 28, 2021   1050 Patient evaluated  Reports improvement in symptoms  Patient was initially sleeping on re-evaluation                                  SBIRT 20yo+      Most Recent Value   SBIRT (24 yo +)   In order to provide better care to our patients, we are screening all of our patients for alcohol and drug use  Would it be okay to ask you these screening questions? No Filed at: 05/28/2021 0910                    MDM  Number of Diagnoses or Management Options  Nausea & vomiting: new and requires workup  UTI (urinary tract infection): new and requires workup  Diagnosis management comments: 44-year-old female with past medical history of alcohol abuse GERD, gastritis, and HIV last CD4, absolute count on 651, on 05/13/2021 presents to the ED for evaluation of nausea, vomiting, and epigastric tenderness x 3 hours  No acute distress  Abdomen soft  Afebrile  Labs noncontributory  Lipase normal   UA reveal evidence of acute cystitis  Will discharge with Macrobid  Patient requested discharge with complete resolution nausea and vomiting  Advised of strict return precaution  Patient tolerating p o  fluids  Advised to continue all medication as directed  Amount and/or Complexity of Data Reviewed  Clinical lab tests: ordered and reviewed  Review and summarize past medical records: yes  Discuss the patient with other providers: yes  Independent visualization of images, tracings, or specimens: yes    Risk of Complications, Morbidity, and/or Mortality  Presenting problems: moderate  Diagnostic procedures: moderate  Management options: moderate    Patient Progress  Patient progress: stable      Disposition  Final diagnoses:   Nausea & vomiting   UTI (urinary tract infection)     Time reflects when diagnosis was documented in both MDM as applicable and the Disposition within this note     Time User Action Codes Description Comment    5/28/2021 11:46 AM Yasmine Abraham Add [R11 2] Nausea & vomiting     5/28/2021 11:46 AM Yasmine Abraham Add [N39 0] UTI (urinary tract infection)       ED Disposition     ED Disposition Condition Date/Time Comment    Discharge Stable Fri May 28, 2021 11:46 AM Greta Crespo discharge to home/self care              Follow-up Information    None         Patient's Medications   Discharge Prescriptions    NITROFURANTOIN (MACROBID) 100 MG CAPSULE    Take 1 capsule (100 mg total) by mouth 2 (two) times a day       Start Date: 5/28/2021 End Date: --       Order Dose: 100 mg       Quantity: 10 capsule    Refills: 0    ONDANSETRON (ZOFRAN-ODT) 4 MG DISINTEGRATING TABLET    Take 1 tablet (4 mg total) by mouth every 6 (six) hours as needed for nausea or vomiting       Start Date: 5/28/2021 End Date: --       Order Dose: 4 mg       Quantity: 20 tablet    Refills: 0     No discharge procedures on file      PDMP Review     None          ED Provider  Electronically Signed by           Em Phelps PA-C  05/28/21 7835

## 2021-05-28 NOTE — ED NOTES
Pt now admits to drinking up until midnight and drank about 12 beers, usually only drinking on weekends and drinks more sometimes       Jaspreet Chacko RN  05/28/21 2133

## 2021-05-28 NOTE — ED NOTES
When asked about how much she drank last night, "I don't know" and that she has vomited more than 10 times this morning     Iris Palumbo RN  05/28/21 3311

## 2021-05-28 NOTE — Clinical Note
Briseida Ledezma was seen and treated in our emergency department on 5/28/2021  Diagnosis:     Anatoliy       She may return on this date: 05/31/2021         If you have any questions or concerns, please don't hesitate to call        Aguilar Ambriz PA-C    ______________________________           _______________          _______________  Hospital Representative                              Date                                Time

## 2021-05-28 NOTE — ED NOTES
Pt asking for ice chips, c/o of throat burning after vomiting, pt informed once she stops vomiting will give her ice chips, warm blankets given for comfort, call bell on side rail     Gisela Hinkle RN  05/28/21 3917

## 2021-05-28 NOTE — ED NOTES
Pt sleeping on stretcher, ice chips and water cup left at bedside     Tello Martinez RN  05/28/21 1662

## 2021-08-09 ENCOUNTER — APPOINTMENT (EMERGENCY)
Dept: RADIOLOGY | Facility: HOSPITAL | Age: 21
End: 2021-08-09
Payer: COMMERCIAL

## 2021-08-09 ENCOUNTER — HOSPITAL ENCOUNTER (EMERGENCY)
Facility: HOSPITAL | Age: 21
Discharge: HOME | End: 2021-08-10
Attending: EMERGENCY MEDICINE | Admitting: EMERGENCY MEDICINE
Payer: COMMERCIAL

## 2021-08-09 VITALS
TEMPERATURE: 101.8 F | OXYGEN SATURATION: 98 % | DIASTOLIC BLOOD PRESSURE: 61 MMHG | RESPIRATION RATE: 18 BRPM | SYSTOLIC BLOOD PRESSURE: 121 MMHG | HEART RATE: 109 BPM

## 2021-08-09 DIAGNOSIS — N12 PYELONEPHRITIS: Primary | ICD-10-CM

## 2021-08-09 LAB
BILIRUB UR QL STRIP: NEGATIVE
CLARITY UR: ABNORMAL
COLOR UR: YELLOW
GLUCOSE UR STRIP-MCNC: NEGATIVE MG/DL
HGB UR QL STRIP.AUTO: ABNORMAL
KETONES UR STRIP-MCNC: NEGATIVE MG/DL
LEUKOCYTE ESTERASE UR QL STRIP: ABNORMAL
NITRITE UR QL STRIP: POSITIVE
PH UR STRIP.AUTO: 7 [PH] (ref 4.5–8)
PROT UR STRIP-MCNC: ABNORMAL MG/DL
SP GR UR STRIP.AUTO: 1.02 (ref 1–1.03)
UROBILINOGEN UR QL STRIP.AUTO: 0.2 E.U./DL

## 2021-08-09 PROCEDURE — 99285 EMERGENCY DEPT VISIT HI MDM: CPT | Performed by: EMERGENCY MEDICINE

## 2021-08-09 PROCEDURE — 81001 URINALYSIS AUTO W/SCOPE: CPT

## 2021-08-09 PROCEDURE — 81025 URINE PREGNANCY TEST: CPT | Performed by: EMERGENCY MEDICINE

## 2021-08-09 PROCEDURE — 71045 X-RAY EXAM CHEST 1 VIEW: CPT

## 2021-08-09 PROCEDURE — 80076 HEPATIC FUNCTION PANEL: CPT | Performed by: EMERGENCY MEDICINE

## 2021-08-09 PROCEDURE — 85025 COMPLETE CBC W/AUTO DIFF WBC: CPT | Performed by: EMERGENCY MEDICINE

## 2021-08-09 PROCEDURE — 87086 URINE CULTURE/COLONY COUNT: CPT

## 2021-08-09 PROCEDURE — U0003 INFECTIOUS AGENT DETECTION BY NUCLEIC ACID (DNA OR RNA); SEVERE ACUTE RESPIRATORY SYNDROME CORONAVIRUS 2 (SARS-COV-2) (CORONAVIRUS DISEASE [COVID-19]), AMPLIFIED PROBE TECHNIQUE, MAKING USE OF HIGH THROUGHPUT TECHNOLOGIES AS DESCRIBED BY CMS-2020-01-R: HCPCS | Performed by: EMERGENCY MEDICINE

## 2021-08-09 PROCEDURE — 80048 BASIC METABOLIC PNL TOTAL CA: CPT | Performed by: EMERGENCY MEDICINE

## 2021-08-09 PROCEDURE — 36415 COLL VENOUS BLD VENIPUNCTURE: CPT | Performed by: EMERGENCY MEDICINE

## 2021-08-09 PROCEDURE — U0005 INFEC AGEN DETEC AMPLI PROBE: HCPCS | Performed by: EMERGENCY MEDICINE

## 2021-08-09 PROCEDURE — 87077 CULTURE AEROBIC IDENTIFY: CPT

## 2021-08-09 PROCEDURE — 99284 EMERGENCY DEPT VISIT MOD MDM: CPT

## 2021-08-09 PROCEDURE — 87186 SC STD MICRODIL/AGAR DIL: CPT

## 2021-08-09 RX ORDER — ONDANSETRON 2 MG/ML
4 INJECTION INTRAMUSCULAR; INTRAVENOUS ONCE
Status: COMPLETED | OUTPATIENT
Start: 2021-08-10 | End: 2021-08-10

## 2021-08-09 RX ORDER — KETOROLAC TROMETHAMINE 30 MG/ML
15 INJECTION, SOLUTION INTRAMUSCULAR; INTRAVENOUS ONCE
Status: COMPLETED | OUTPATIENT
Start: 2021-08-10 | End: 2021-08-10

## 2021-08-10 LAB
ALBUMIN SERPL BCP-MCNC: 3.7 G/DL (ref 3.5–5)
ALP SERPL-CCNC: 73 U/L (ref 46–116)
ALT SERPL W P-5'-P-CCNC: 24 U/L (ref 12–78)
ANION GAP SERPL CALCULATED.3IONS-SCNC: 10 MMOL/L (ref 4–13)
AST SERPL W P-5'-P-CCNC: 22 U/L (ref 5–45)
BACTERIA UR QL AUTO: ABNORMAL /HPF
BASOPHILS # BLD AUTO: 0.04 THOUSANDS/ΜL (ref 0–0.1)
BASOPHILS NFR BLD AUTO: 0 % (ref 0–1)
BILIRUB DIRECT SERPL-MCNC: 0.17 MG/DL (ref 0–0.2)
BILIRUB SERPL-MCNC: 0.7 MG/DL (ref 0.2–1)
BUN SERPL-MCNC: 12 MG/DL (ref 5–25)
CALCIUM SERPL-MCNC: 9 MG/DL (ref 8.3–10.1)
CHLORIDE SERPL-SCNC: 102 MMOL/L (ref 100–108)
CO2 SERPL-SCNC: 28 MMOL/L (ref 21–32)
CREAT SERPL-MCNC: 0.83 MG/DL (ref 0.6–1.3)
EOSINOPHIL # BLD AUTO: 0.01 THOUSAND/ΜL (ref 0–0.61)
EOSINOPHIL NFR BLD AUTO: 0 % (ref 0–6)
ERYTHROCYTE [DISTWIDTH] IN BLOOD BY AUTOMATED COUNT: 14.3 % (ref 11.6–15.1)
EXT PREG TEST URINE: NORMAL
EXT. CONTROL ED NAV: NORMAL
GFR SERPL CREATININE-BSD FRML MDRD: 101 ML/MIN/1.73SQ M
GLUCOSE SERPL-MCNC: 98 MG/DL (ref 65–140)
HCT VFR BLD AUTO: 38.3 % (ref 34.8–46.1)
HGB BLD-MCNC: 12.2 G/DL (ref 11.5–15.4)
IMM GRANULOCYTES # BLD AUTO: 0.02 THOUSAND/UL (ref 0–0.2)
IMM GRANULOCYTES NFR BLD AUTO: 0 % (ref 0–2)
LYMPHOCYTES # BLD AUTO: 2.96 THOUSANDS/ΜL (ref 0.6–4.47)
LYMPHOCYTES NFR BLD AUTO: 32 % (ref 14–44)
MCH RBC QN AUTO: 25.3 PG (ref 26.8–34.3)
MCHC RBC AUTO-ENTMCNC: 31.9 G/DL (ref 31.4–37.4)
MCV RBC AUTO: 79 FL (ref 82–98)
MONOCYTES # BLD AUTO: 0.86 THOUSAND/ΜL (ref 0.17–1.22)
MONOCYTES NFR BLD AUTO: 9 % (ref 4–12)
NEUTROPHILS # BLD AUTO: 5.41 THOUSANDS/ΜL (ref 1.85–7.62)
NEUTS SEG NFR BLD AUTO: 59 % (ref 43–75)
NON-SQ EPI CELLS URNS QL MICRO: ABNORMAL /HPF
NRBC BLD AUTO-RTO: 0 /100 WBCS
PLATELET # BLD AUTO: 188 THOUSANDS/UL (ref 149–390)
PMV BLD AUTO: 10.2 FL (ref 8.9–12.7)
POTASSIUM SERPL-SCNC: 4.2 MMOL/L (ref 3.5–5.3)
PROT SERPL-MCNC: 8.3 G/DL (ref 6.4–8.2)
RBC # BLD AUTO: 4.83 MILLION/UL (ref 3.81–5.12)
RBC #/AREA URNS AUTO: ABNORMAL /HPF
SARS-COV-2 RNA RESP QL NAA+PROBE: NEGATIVE
SODIUM SERPL-SCNC: 140 MMOL/L (ref 136–145)
WBC # BLD AUTO: 9.3 THOUSAND/UL (ref 4.31–10.16)
WBC #/AREA URNS AUTO: ABNORMAL /HPF

## 2021-08-10 PROCEDURE — 96365 THER/PROPH/DIAG IV INF INIT: CPT

## 2021-08-10 PROCEDURE — 96375 TX/PRO/DX INJ NEW DRUG ADDON: CPT

## 2021-08-10 PROCEDURE — 96361 HYDRATE IV INFUSION ADD-ON: CPT

## 2021-08-10 RX ORDER — CEPHALEXIN 500 MG/1
500 CAPSULE ORAL EVERY 6 HOURS SCHEDULED
Qty: 40 CAPSULE | Refills: 0 | Status: SHIPPED | OUTPATIENT
Start: 2021-08-10 | End: 2021-08-20

## 2021-08-10 RX ORDER — IBUPROFEN 600 MG/1
600 TABLET ORAL EVERY 6 HOURS PRN
Qty: 30 TABLET | Refills: 0 | Status: SHIPPED | OUTPATIENT
Start: 2021-08-10 | End: 2022-02-02 | Stop reason: HOSPADM

## 2021-08-10 RX ADMIN — ONDANSETRON 4 MG: 2 INJECTION INTRAMUSCULAR; INTRAVENOUS at 00:00

## 2021-08-10 RX ADMIN — CEFTRIAXONE SODIUM 1000 MG: 10 INJECTION, POWDER, FOR SOLUTION INTRAVENOUS at 00:32

## 2021-08-10 RX ADMIN — KETOROLAC TROMETHAMINE 15 MG: 30 INJECTION, SOLUTION INTRAMUSCULAR; INTRAVENOUS at 00:02

## 2021-08-10 RX ADMIN — SODIUM CHLORIDE 1000 ML: 0.9 INJECTION, SOLUTION INTRAVENOUS at 00:00

## 2021-08-10 NOTE — ED PROVIDER NOTES
History  Chief Complaint   Patient presents with    Dizziness     c/o dizziness every ten min  states her face feels hot and her chest feels tight  ongoing for two days  also states one episode of vomiting     This is a 27-year-old female with a history of HIV currently on HAART who presents with viral symptoms  Starting yesterday, the patient has been experiencing fever, lightheadedness, diffuse body aches, headaches  Denies any sick contacts  Patient states that she tested negative for COVID approximately 14 days ago  She did not receive the COVID vaccine  She has not taken anything for her symptoms  She also experienced 1 episode of nonbloody, nonbilious vomiting  Denies lightheadedness/dizziness, numbness/weakness, change in vision, URI symptoms, neck pain, chest pain, palpitations, shortness of breath, cough, back pain, flank pain, abdominal pain, diarrhea, hematochezia, melena, dysuria, hematuria, abnormal vaginal discharge/bleeding  Prior to Admission Medications   Prescriptions Last Dose Informant Patient Reported?  Taking?   acetaminophen (TYLENOL) 500 mg tablet   No No   Sig: Take 1 tablet (500 mg total) by mouth every 6 (six) hours as needed for mild pain   bictegravir-emtricitab-tenofovir alafenamide (BIKTARVY) -25 MG tablet   Yes No   Sig: Take 1 tablet by mouth daily   elvitegravir-cobicistat-emtricitabine-tenofovir alafenamide (GENVOYA) tablet   Yes No   Sig: Take 1 tablet by mouth daily    fluticasone (FLONASE) 50 mcg/act nasal spray   No No   Si spray into each nostril daily   hyoscyamine (ANASPAZ,LEVSIN) 0 125 MG tablet   No No   Sig: Take 1 tablet (0 125 mg total) by mouth every 4 (four) hours as needed for cramping   Patient not taking: Reported on 2021   loratadine (CLARITIN) 10 mg tablet   No No   Sig: Take 1 tablet (10 mg total) by mouth daily   nitrofurantoin (MACROBID) 100 mg capsule   No No   Sig: Take 1 capsule (100 mg total) by mouth 2 (two) times a day ondansetron (ZOFRAN-ODT) 4 mg disintegrating tablet   No No   Sig: Take 1 tablet (4 mg total) by mouth every 8 (eight) hours as needed for nausea   Patient not taking: Reported on 2021   ondansetron (ZOFRAN-ODT) 4 mg disintegrating tablet   No No   Sig: Take 1 tablet (4 mg total) by mouth every 6 (six) hours as needed for nausea or vomiting   pantoprazole (PROTONIX) 40 mg tablet   Yes No   Sig: Take 40 mg by mouth daily      Facility-Administered Medications: None       Past Medical History:   Diagnosis Date    HIV (human immunodeficiency virus infection) ([facility] [address] )        Past Surgical History:   Procedure Laterality Date     SECTION      CHOLECYSTECTOMY         History reviewed  No pertinent family history  I have reviewed and agree with the history as documented  E-Cigarette/Vaping    E-Cigarette Use Never User      E-Cigarette/Vaping Substances     Social History     Tobacco Use    Smoking status: Never Smoker    Smokeless tobacco: Never Used   Vaping Use    Vaping Use: Never used   Substance Use Topics    Alcohol use: Yes     Comment: Pt reports that she drinks 3-4 bottles of Leonela per day  Pt states that the size of the bottle is 1/5    Drug use: Yes     Types: Marijuana       Review of Systems   Constitutional: Positive for chills and fever  Body aches   HENT: Negative for rhinorrhea, sore throat and trouble swallowing  Eyes: Negative for photophobia and visual disturbance  Respiratory: Negative for cough, chest tightness and shortness of breath  Cardiovascular: Negative for chest pain, palpitations and leg swelling  Gastrointestinal: Positive for nausea and vomiting  Negative for abdominal pain, blood in stool and diarrhea  Endocrine: Negative for polyuria  Genitourinary: Negative for dysuria, flank pain, hematuria, vaginal bleeding and vaginal discharge  Musculoskeletal: Negative for back pain and neck pain  Skin: Negative for color change and rash  Allergic/Immunologic: Negative for immunocompromised state  Neurological: Positive for light-headedness and headaches  Negative for dizziness, weakness and numbness  All other systems reviewed and are negative  Physical Exam  Physical Exam  Constitutional:       General: She is not in acute distress  Appearance: Normal appearance  She is well-developed  HENT:      Mouth/Throat:      Pharynx: Uvula midline  Eyes:      Conjunctiva/sclera: Conjunctivae normal       Pupils: Pupils are equal, round, and reactive to light  Neck:      Thyroid: No thyroid mass or thyromegaly  Trachea: Trachea normal    Cardiovascular:      Rate and Rhythm: Regular rhythm  Tachycardia present  Heart sounds: Normal heart sounds  No murmur heard  Pulmonary:      Effort: Pulmonary effort is normal       Breath sounds: Normal breath sounds  Abdominal:      General: Bowel sounds are normal       Palpations: Abdomen is soft  Tenderness: There is no abdominal tenderness  There is no guarding or rebound  Skin:     General: Skin is warm and dry  Neurological:      Mental Status: She is alert  Psychiatric:         Speech: Speech normal          Behavior: Behavior normal  Behavior is cooperative  Thought Content:  Thought content normal          Vital Signs  ED Triage Vitals [08/09/21 2340]   Temperature Pulse Respirations Blood Pressure SpO2   (!) 101 8 °F (38 8 °C) (!) 109 18 121/61 98 %      Temp Source Heart Rate Source Patient Position - Orthostatic VS BP Location FiO2 (%)   Oral Monitor Sitting Right arm --      Pain Score       8           Vitals:    08/09/21 2340   BP: 121/61   Pulse: (!) 109   Patient Position - Orthostatic VS: Sitting         Visual Acuity      ED Medications  Medications   sodium chloride 0 9 % bolus 1,000 mL (1,000 mL Intravenous New Bag 8/10/21 0000)   ondansetron (ZOFRAN) injection 4 mg (4 mg Intravenous Given 8/10/21 0000)   ketorolac (TORADOL) injection 15 mg (15 mg Intravenous Given 8/10/21 0002)   ceftriaxone (ROCEPHIN) 1 g/50 mL in dextrose IVPB (1,000 mg Intravenous New Bag 8/10/21 0032)       Diagnostic Studies  Results Reviewed     Procedure Component Value Units Date/Time    Novel Coronavirus (Covid-19),PCR [facility] - 2 Hour Stat [[id number]]  (Normal) Collected: 08/09/21 2354    Lab Status: Final result Specimen: Nares from Nose Updated: 08/10/21 0106     SARS-CoV-2 Negative    Narrative: The specimen collection materials, transport medium, and/or testing methodology utilized in the production of these test results have been proven to be reliable in a limited validation with an abbreviated program under the Emergency Utilization Authorization provided by the FDA  Testing reported as "Presumptive positive" will be confirmed with secondary testing to ensure result accuracy  Clinical caution and judgement should be used with the interpretation of these results with consideration of the clinical impression and other laboratory testing  Testing reported as "Positive" or "Negative" has been proven to be accurate according to standard laboratory validation requirements  All testing is performed with control materials showing appropriate reactivity at standard intervals  Hepatic function panel [[id number]]  (Abnormal) Collected: 08/09/21 2359    Lab Status: Final result Specimen: Blood from Arm, Right Updated: 08/10/21 0039     Total Bilirubin 0 70 mg/dL      Bilirubin, Direct 0 17 mg/dL      Alkaline Phosphatase 73 U/L      AST 22 U/L      ALT 24 U/L      Total Protein 8 3 g/dL      Albumin 3 7 g/dL     Urine Microscopic [[id number]]  (Abnormal) Collected: 08/09/21 2356    Lab Status: Final result Specimen: Urine, Clean Catch Updated: 08/10/21 0036     RBC, UA 0-1 /hpf      WBC, UA Innumerable /hpf      Epithelial Cells Occasional /hpf      Bacteria, UA Moderate /hpf     Urine culture [[id number]] Collected: 08/09/21 2356    Lab Status:  In process Specimen: Urine, Clean Catch Updated: 08/10/21 6312    Basic metabolic panel [[id number]] Collected: 08/09/21 2359    Lab Status: Final result Specimen: Blood from Arm, Right Updated: 08/10/21 0027     Sodium 140 mmol/L      Potassium 4 2 mmol/L      Chloride 102 mmol/L      CO2 28 mmol/L      ANION GAP 10 mmol/L      BUN 12 mg/dL      Creatinine 0 83 mg/dL      Glucose 98 mg/dL      Calcium 9 0 mg/dL      eGFR 101 ml/min/1 73sq m     Narrative:      [place] guidelines for Chronic Kidney Disease (CKD):     Stage 1 with normal or high GFR (GFR > 90 mL/min/1 73 square meters)    Stage 2 Mild CKD (GFR = 60-89 mL/min/1 73 square meters)    Stage 3A Moderate CKD (GFR = 45-59 mL/min/1 73 square meters)    Stage 3B Moderate CKD (GFR = 30-44 mL/min/1 73 square meters)    Stage 4 Severe CKD (GFR = 15-29 mL/min/1 73 square meters)    Stage 5 End Stage CKD (GFR <15 mL/min/1 73 square meters)  Note: GFR calculation is accurate only with a steady state creatinine    CBC and differential [[id number]]  (Abnormal) Collected: 08/09/21 2359    Lab Status: Final result Specimen: Blood from Arm, Right Updated: 08/10/21 0006     WBC 9 30 Thousand/uL      RBC 4 83 Million/uL      Hemoglobin 12 2 g/dL      Hematocrit 38 3 %      MCV 79 fL      MCH 25 3 pg      MCHC 31 9 g/dL      RDW 14 3 %      MPV 10 2 fL      Platelets 556 Thousands/uL      nRBC 0 /100 WBCs      Neutrophils Relative 59 %      Immat GRANS % 0 %      Lymphocytes Relative 32 %      Monocytes Relative 9 %      Eosinophils Relative 0 %      Basophils Relative 0 %      Neutrophils Absolute 5 41 Thousands/µL      Immature Grans Absolute 0 02 Thousand/uL      Lymphocytes Absolute 2 96 Thousands/µL      Monocytes Absolute 0 86 Thousand/µL      Eosinophils Absolute 0 01 Thousand/µL      Basophils Absolute 0 04 Thousands/µL     POCT pregnancy, urine [[id number]]  (Normal) Resulted: 08/10/21 0005    Lab Status: Final result Updated: 08/10/21 0005     EXT PREG TEST UR (Ref: Negative) NEG (-)     Control Valid    Urine Macroscopic, POC [[id number]]  (Abnormal) Collected: 08/09/21 2356    Lab Status: Final result Specimen: Urine Updated: 08/09/21 2358     Color, UA Yellow     Clarity, UA Cloudy     pH, UA 7 0     Leukocytes, UA Moderate     Nitrite, UA Positive     Protein, UA 30 (1+) mg/dl      Glucose, UA Negative mg/dl      Ketones, UA Negative mg/dl      Urobilinogen, UA 0 2 E U /dl      Bilirubin, UA Negative     Blood, UA Moderate     Specific Shushan, UA 1 020    Narrative:      CLINITEK RESULT                 XR chest 1 view portable   ED Interpretation by Tenzin Tadeo MD (08/10 0013)   No acute cardiopulmonary disease as interpreted by myself  Procedures  Procedures         ED Course                             SBIRT 22yo+      Most Recent Value   SBIRT (24 yo +)   In order to provide better care to our patients, we are screening all of our patients for alcohol and drug use  Would it be okay to ask you these screening questions? No Filed at: 08/10/2021 0015                    MDM  Number of Diagnoses or Management Options  Diagnosis management comments: Check labs, COVID swab  Urinalysis, chest x-ray  Fluids, Toradol, Zofran for symptoms  Disposition  Final diagnoses:   Pyelonephritis     Time reflects when diagnosis was documented in both MDM as applicable and the Disposition within this note     Time User Action Codes Description Comment    8/10/2021  1:05 AM Karthik Adames Add [N12] Pyelonephritis       ED Disposition     ED Disposition Condition Date/Time Comment    Discharge Stable Tue Aug 10, 2021  1:05 AM Anthony Gonzalez discharge to home/self care              Follow-up Information     Follow up With Specialties Details Why Contact Info Additional [address] [facility] Emergency Department Emergency Medicine Go to  If symptoms worsen [facility] [id number]  [address] [place] [address] [facility] Emergency Department, [address] [facility] [address]  , [facility], [place], [facility] Family Medicine Schedule an appointment as soon as possible for a visit   [address], [address] [facility] [id number]  [address] [facility] [address], [address], [address] [place], [place], [address]          Patient's Medications   Discharge Prescriptions    CEPHALEXIN (KEFLEX) 500 MG CAPSULE    Take 1 capsule (500 mg total) by mouth every 6 (six) hours for 10 days       Start Date: 8/10/2021 End Date: 8/20/2021       Order Dose: 500 mg       Quantity: 40 capsule    Refills: 0    IBUPROFEN (MOTRIN) 600 MG TABLET    Take 1 tablet (600 mg total) by mouth every 6 (six) hours as needed for mild pain       Start Date: 8/10/2021 End Date: --       Order Dose: 600 mg       Quantity: 30 tablet    Refills: 0     No discharge procedures on file      PDMP Review     None          ED Provider  Electronically Signed by           Jersey Pineda MD  08/10/21 0060

## 2021-08-12 LAB — BACTERIA UR CULT: ABNORMAL

## 2021-09-26 ENCOUNTER — HOSPITAL ENCOUNTER (EMERGENCY)
Facility: HOSPITAL | Age: 21
Discharge: HOME | End: 2021-09-27
Attending: EMERGENCY MEDICINE
Payer: COMMERCIAL

## 2021-09-26 VITALS
RESPIRATION RATE: 18 BRPM | SYSTOLIC BLOOD PRESSURE: 115 MMHG | TEMPERATURE: 98.5 F | DIASTOLIC BLOOD PRESSURE: 68 MMHG | OXYGEN SATURATION: 100 % | HEART RATE: 105 BPM

## 2021-09-26 DIAGNOSIS — S80.811A ABRASION OF RIGHT LOWER EXTREMITY, INITIAL ENCOUNTER: ICD-10-CM

## 2021-09-26 DIAGNOSIS — Z3A.01 LESS THAN 8 WEEKS GESTATION OF PREGNANCY: ICD-10-CM

## 2021-09-26 DIAGNOSIS — R10.9 ABDOMINAL PAIN DUE TO INJURY: ICD-10-CM

## 2021-09-26 DIAGNOSIS — M54.9 BACK PAIN: ICD-10-CM
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
## 2021-09-26 PROCEDURE — 99284 EMERGENCY DEPT VISIT MOD MDM: CPT

## 2021-09-26 PROCEDURE — 90715 TDAP VACCINE 7 YRS/> IM: CPT | Performed by: EMERGENCY MEDICINE

## 2021-09-26 PROCEDURE — 90471 IMMUNIZATION ADMIN: CPT

## 2021-09-26 RX ORDER — ACETAMINOPHEN 325 MG/1
975 TABLET ORAL ONCE
Status: COMPLETED | OUTPATIENT
Start: 2021-09-26 | End: 2021-09-26

## 2021-09-26 RX ORDER — PNV NO.95/FERROUS FUM/FOLIC AC 28MG-0.8MG
1 TABLET ORAL DAILY
Qty: 30 TABLET | Refills: 0 | Status: SHIPPED | OUTPATIENT
Start: 2021-09-26

## 2021-09-26 RX ADMIN — TETANUS TOXOID, REDUCED DIPHTHERIA TOXOID AND ACELLULAR PERTUSSIS VACCINE, ADSORBED 0.5 ML: 5; 2.5; 8; 8; 2.5 SUSPENSION INTRAMUSCULAR at 23:15

## 2021-09-26 RX ADMIN — ACETAMINOPHEN 975 MG: 325 TABLET, FILM COATED ORAL at 23:16

## 2021-09-27 PROCEDURE — 99284 EMERGENCY DEPT VISIT MOD MDM: CPT | Performed by: EMERGENCY MEDICINE

## 2021-10-03 ENCOUNTER — HOSPITAL ENCOUNTER (EMERGENCY)
Facility: HOSPITAL | Age: 21
Discharge: HOME | End: 2021-10-03
Attending: EMERGENCY MEDICINE
Payer: COMMERCIAL

## 2021-10-03 VITALS
TEMPERATURE: 98.3 F | OXYGEN SATURATION: 97 % | WEIGHT: 158.07 LBS | DIASTOLIC BLOOD PRESSURE: 75 MMHG | RESPIRATION RATE: 16 BRPM | HEART RATE: 108 BPM | SYSTOLIC BLOOD PRESSURE: 136 MMHG

## 2021-10-03 DIAGNOSIS — S30.1XXA CONTUSION OF ABDOMINAL WALL, INITIAL ENCOUNTER: ICD-10-CM
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
## 2021-10-03 LAB
BACTERIA UR QL AUTO: ABNORMAL /HPF
BILIRUB UR QL STRIP: NEGATIVE
CLARITY UR: CLEAR
COLOR UR: YELLOW
EXT PREG TEST URINE: NEGATIVE
EXT. CONTROL ED NAV: NORMAL
GLUCOSE UR STRIP-MCNC: NEGATIVE MG/DL
HGB UR QL STRIP.AUTO: ABNORMAL
KETONES UR STRIP-MCNC: NEGATIVE MG/DL
LEUKOCYTE ESTERASE UR QL STRIP: ABNORMAL
NITRITE UR QL STRIP: NEGATIVE
NON-SQ EPI CELLS URNS QL MICRO: ABNORMAL /HPF
PH UR STRIP.AUTO: 7 [PH] (ref 4.5–8)
PROT UR STRIP-MCNC: NEGATIVE MG/DL
RBC #/AREA URNS AUTO: ABNORMAL /HPF
SP GR UR STRIP.AUTO: 1.02 (ref 1–1.03)
UROBILINOGEN UR QL STRIP.AUTO: 0.2 E.U./DL
WBC #/AREA URNS AUTO: ABNORMAL /HPF

## 2021-10-03 PROCEDURE — 99284 EMERGENCY DEPT VISIT MOD MDM: CPT

## 2021-10-03 PROCEDURE — 81001 URINALYSIS AUTO W/SCOPE: CPT

## 2021-10-03 PROCEDURE — 87086 URINE CULTURE/COLONY COUNT: CPT

## 2021-10-03 PROCEDURE — 99284 EMERGENCY DEPT VISIT MOD MDM: CPT | Performed by: EMERGENCY MEDICINE

## 2021-10-03 PROCEDURE — 87147 CULTURE TYPE IMMUNOLOGIC: CPT

## 2021-10-03 PROCEDURE — 81025 URINE PREGNANCY TEST: CPT | Performed by: EMERGENCY MEDICINE

## 2021-10-03 RX ORDER — ACETAMINOPHEN 325 MG/1
650 TABLET ORAL ONCE
Status: COMPLETED | OUTPATIENT
Start: 2021-10-03 | End: 2021-10-03

## 2021-10-03 RX ADMIN — ACETAMINOPHEN 650 MG: 325 TABLET, FILM COATED ORAL at 17:22

## 2021-10-04 LAB
BACTERIA UR CULT: ABNORMAL
BACTERIA UR CULT: ABNORMAL

## 2021-10-27 ENCOUNTER — HOSPITAL ENCOUNTER (EMERGENCY)
Facility: HOSPITAL | Age: 21
Discharge: HOME | End: 2021-10-28
Attending: EMERGENCY MEDICINE | Admitting: EMERGENCY MEDICINE
Payer: COMMERCIAL

## 2021-10-27 DIAGNOSIS — F12.90 MARIJUANA USE: Primary | ICD-10-CM

## 2021-10-27 DIAGNOSIS — T88.7XXA DRUG SIDE EFFECTS: ICD-10-CM

## 2021-10-27 PROCEDURE — 99284 EMERGENCY DEPT VISIT MOD MDM: CPT | Performed by: EMERGENCY MEDICINE

## 2021-10-27 PROCEDURE — 96374 THER/PROPH/DIAG INJ IV PUSH: CPT

## 2021-10-27 PROCEDURE — 99283 EMERGENCY DEPT VISIT LOW MDM: CPT

## 2021-10-27 PROCEDURE — 96361 HYDRATE IV INFUSION ADD-ON: CPT

## 2021-10-27 RX ORDER — ONDANSETRON 2 MG/ML
4 INJECTION INTRAMUSCULAR; INTRAVENOUS ONCE
Status: COMPLETED | OUTPATIENT
Start: 2021-10-27 | End: 2021-10-27

## 2021-10-27 RX ADMIN — ONDANSETRON 4 MG: 2 INJECTION INTRAMUSCULAR; INTRAVENOUS at 22:45

## 2021-10-27 RX ADMIN — SODIUM CHLORIDE 1000 ML: 0.9 INJECTION, SOLUTION INTRAVENOUS at 22:45

## 2021-10-28 VITALS
OXYGEN SATURATION: 100 % | SYSTOLIC BLOOD PRESSURE: 100 MMHG | RESPIRATION RATE: 16 BRPM | HEART RATE: 85 BPM | WEIGHT: 149.69 LBS | DIASTOLIC BLOOD PRESSURE: 66 MMHG | TEMPERATURE: 96.9 F

## 2021-12-24 ENCOUNTER — HOSPITAL ENCOUNTER (EMERGENCY)
Facility: HOSPITAL | Age: 21
Discharge: HOME | End: 2021-12-24
Attending: EMERGENCY MEDICINE
Payer: COMMERCIAL

## 2021-12-24 VITALS
HEART RATE: 97 BPM | RESPIRATION RATE: 18 BRPM | DIASTOLIC BLOOD PRESSURE: 67 MMHG | SYSTOLIC BLOOD PRESSURE: 124 MMHG | OXYGEN SATURATION: 100 % | TEMPERATURE: 98.6 F

## 2021-12-24 DIAGNOSIS — J10.1 INFLUENZA A VIRUS PRESENT: Primary | ICD-10-CM

## 2021-12-24 LAB
ALBUMIN SERPL BCP-MCNC: 3.2 G/DL (ref 3.5–5)
ALP SERPL-CCNC: 68 U/L (ref 46–116)
ALT SERPL W P-5'-P-CCNC: 49 U/L (ref 12–78)
ANION GAP SERPL CALCULATED.3IONS-SCNC: 5 MMOL/L (ref 4–13)
AST SERPL W P-5'-P-CCNC: 40 U/L (ref 5–45)
BACTERIA UR QL AUTO: ABNORMAL /HPF
BASOPHILS # BLD AUTO: 0.03 THOUSANDS/ΜL (ref 0–0.1)
BASOPHILS NFR BLD AUTO: 0 % (ref 0–1)
BILIRUB SERPL-MCNC: 0.23 MG/DL (ref 0.2–1)
BILIRUB UR QL STRIP: NEGATIVE
BUN SERPL-MCNC: 7 MG/DL (ref 5–25)
CALCIUM ALBUM COR SERPL-MCNC: 10.2 MG/DL (ref 8.3–10.1)
CALCIUM SERPL-MCNC: 9.6 MG/DL (ref 8.3–10.1)
CHLORIDE SERPL-SCNC: 103 MMOL/L (ref 100–108)
CLARITY UR: CLEAR
CO2 SERPL-SCNC: 28 MMOL/L (ref 21–32)
COLOR UR: YELLOW
CREAT SERPL-MCNC: 0.71 MG/DL (ref 0.6–1.3)
EOSINOPHIL # BLD AUTO: 0.03 THOUSAND/ΜL (ref 0–0.61)
EOSINOPHIL NFR BLD AUTO: 0 % (ref 0–6)
ERYTHROCYTE [DISTWIDTH] IN BLOOD BY AUTOMATED COUNT: 14.9 % (ref 11.6–15.1)
FLUAV RNA RESP QL NAA+PROBE: POSITIVE
FLUBV RNA RESP QL NAA+PROBE: NEGATIVE
GFR SERPL CREATININE-BSD FRML MDRD: 122 ML/MIN/1.73SQ M
GLUCOSE SERPL-MCNC: 95 MG/DL (ref 65–140)
GLUCOSE UR STRIP-MCNC: NEGATIVE MG/DL
HCT VFR BLD AUTO: 38 % (ref 34.8–46.1)
HGB BLD-MCNC: 12.5 G/DL (ref 11.5–15.4)
HGB UR QL STRIP.AUTO: ABNORMAL
HYALINE CASTS #/AREA URNS LPF: ABNORMAL /LPF
IMM GRANULOCYTES # BLD AUTO: 0.02 THOUSAND/UL (ref 0–0.2)
IMM GRANULOCYTES NFR BLD AUTO: 0 % (ref 0–2)
KETONES UR STRIP-MCNC: NEGATIVE MG/DL
LEUKOCYTE ESTERASE UR QL STRIP: ABNORMAL
LYMPHOCYTES # BLD AUTO: 2.79 THOUSANDS/ΜL (ref 0.6–4.47)
LYMPHOCYTES NFR BLD AUTO: 35 % (ref 14–44)
MCH RBC QN AUTO: 27.3 PG (ref 26.8–34.3)
MCHC RBC AUTO-ENTMCNC: 32.9 G/DL (ref 31.4–37.4)
MCV RBC AUTO: 83 FL (ref 82–98)
MONOCYTES # BLD AUTO: 0.81 THOUSAND/ΜL (ref 0.17–1.22)
MONOCYTES NFR BLD AUTO: 10 % (ref 4–12)
NEUTROPHILS # BLD AUTO: 4.35 THOUSANDS/ΜL (ref 1.85–7.62)
NEUTS SEG NFR BLD AUTO: 55 % (ref 43–75)
NITRITE UR QL STRIP: NEGATIVE
NON-SQ EPI CELLS URNS QL MICRO: ABNORMAL /HPF
NRBC BLD AUTO-RTO: 0 /100 WBCS
PH UR STRIP.AUTO: 6 [PH] (ref 4.5–8)
PLATELET # BLD AUTO: 220 THOUSANDS/UL (ref 149–390)
PMV BLD AUTO: 10.7 FL (ref 8.9–12.7)
POTASSIUM SERPL-SCNC: 3.9 MMOL/L (ref 3.5–5.3)
PROT SERPL-MCNC: 7.5 G/DL (ref 6.4–8.2)
PROT UR STRIP-MCNC: NEGATIVE MG/DL
RBC # BLD AUTO: 4.58 MILLION/UL (ref 3.81–5.12)
RBC #/AREA URNS AUTO: ABNORMAL /HPF
RSV RNA RESP QL NAA+PROBE: NEGATIVE
SARS-COV-2 RNA RESP QL NAA+PROBE: NEGATIVE
SODIUM SERPL-SCNC: 136 MMOL/L (ref 136–145)
SP GR UR STRIP.AUTO: 1.01 (ref 1–1.03)
UROBILINOGEN UR QL STRIP.AUTO: 0.2 E.U./DL
WBC # BLD AUTO: 8.03 THOUSAND/UL (ref 4.31–10.16)
WBC #/AREA URNS AUTO: ABNORMAL /HPF

## 2021-12-24 PROCEDURE — 0241U HB NFCT DS VIR RESP RNA 4 TRGT: CPT

## 2021-12-24 PROCEDURE — 87147 CULTURE TYPE IMMUNOLOGIC: CPT

## 2021-12-24 PROCEDURE — 36415 COLL VENOUS BLD VENIPUNCTURE: CPT

## 2021-12-24 PROCEDURE — 96374 THER/PROPH/DIAG INJ IV PUSH: CPT

## 2021-12-24 PROCEDURE — 80053 COMPREHEN METABOLIC PANEL: CPT

## 2021-12-24 PROCEDURE — 96361 HYDRATE IV INFUSION ADD-ON: CPT

## 2021-12-24 PROCEDURE — 99284 EMERGENCY DEPT VISIT MOD MDM: CPT

## 2021-12-24 PROCEDURE — 99284 EMERGENCY DEPT VISIT MOD MDM: CPT | Performed by: EMERGENCY MEDICINE

## 2021-12-24 PROCEDURE — 87086 URINE CULTURE/COLONY COUNT: CPT

## 2021-12-24 PROCEDURE — 85025 COMPLETE CBC W/AUTO DIFF WBC: CPT

## 2021-12-24 PROCEDURE — 76815 OB US LIMITED FETUS(S): CPT | Performed by: EMERGENCY MEDICINE

## 2021-12-24 PROCEDURE — 81001 URINALYSIS AUTO W/SCOPE: CPT

## 2021-12-24 RX ORDER — ACETAMINOPHEN 325 MG/1
650 TABLET ORAL ONCE
Status: COMPLETED | OUTPATIENT
Start: 2021-12-24 | End: 2021-12-24

## 2021-12-24 RX ORDER — METOCLOPRAMIDE HYDROCHLORIDE 5 MG/ML
5 INJECTION INTRAMUSCULAR; INTRAVENOUS ONCE
Status: COMPLETED | OUTPATIENT
Start: 2021-12-24 | End: 2021-12-24

## 2021-12-24 RX ORDER — ACETAMINOPHEN 325 MG/1
650 TABLET ORAL EVERY 6 HOURS PRN
Qty: 30 TABLET | Refills: 0 | Status: SHIPPED | OUTPATIENT
Start: 2021-12-24

## 2021-12-24 RX ADMIN — ACETAMINOPHEN 650 MG: 325 TABLET, FILM COATED ORAL at 20:57

## 2021-12-24 RX ADMIN — METOCLOPRAMIDE HYDROCHLORIDE 5 MG: 5 INJECTION INTRAMUSCULAR; INTRAVENOUS at 21:13

## 2021-12-24 RX ADMIN — SODIUM CHLORIDE 1000 ML: 0.9 INJECTION, SOLUTION INTRAVENOUS at 21:22

## 2021-12-25 NOTE — ED PROVIDER NOTES
History  Chief Complaint   Patient presents with    Dizziness     vomiting, with generalized body aches  17 weeks pregnant      Patient is a 23 y/o F [id number] at 17 weeks pregnant, PMH HIV presenting with vomiting, myalgias, fatigue  Pt has had symptoms for past 2 days, feels like she can't keep any food/liquid down  She states she is 17 weeks pregnant w/o complications of pregnancy so far  She mentions that her recent testing for gonnorrhea, chlamydia came back negative, and she has been taking antibiotics for trichomonas  She denies chest pain, shortness of breath, abdominal pain, vaginal bleeding, loss of fluid, contractions  Prior to Admission Medications   Prescriptions Last Dose Informant Patient Reported? Taking?    Prenatal Vit-Fe Fumarate-FA (Prenatal Vitamin) 27-0 8 MG TABS 2021 at Unknown time  No Yes   Sig: Take 1 tablet by mouth daily   acetaminophen (TYLENOL) 500 mg tablet Not Taking at Unknown time  No No   Sig: Take 1 tablet (500 mg total) by mouth every 6 (six) hours as needed for mild pain   Patient not taking: Reported on 10/3/2021   bictegravir-emtricitab-tenofovir alafenamide (BIKTARVY) -25 MG tablet   Yes No   Sig: Take 1 tablet by mouth daily   Patient not taking: Reported on 10/3/2021   elvitegravir-cobicistat-emtricitabine-tenofovir alafenamide (GENVOYA) tablet   Yes No   Sig: Take 1 tablet by mouth daily    Patient not taking: Reported on 10/3/2021   fluticasone (FLONASE) 50 mcg/act nasal spray Not Taking at Unknown time  No No   Si spray into each nostril daily   Patient not taking: Reported on 10/3/2021   hyoscyamine (ANASPAZ,LEVSIN) 0 125 MG tablet Not Taking at Unknown time  No No   Sig: Take 1 tablet (0 125 mg total) by mouth every 4 (four) hours as needed for cramping   Patient not taking: Reported on 2021   ibuprofen (MOTRIN) 600 mg tablet Not Taking at Unknown time  No No   Sig: Take 1 tablet (600 mg total) by mouth every 6 (six) hours as needed for mild pain   Patient not taking: Reported on 10/3/2021   loratadine (CLARITIN) 10 mg tablet   No No   Sig: Take 1 tablet (10 mg total) by mouth daily   Patient not taking: Reported on 10/3/2021   nitrofurantoin (MACROBID) 100 mg capsule   No No   Sig: Take 1 capsule (100 mg total) by mouth 2 (two) times a day   Patient not taking: Reported on 10/3/2021   ondansetron (ZOFRAN-ODT) 4 mg disintegrating tablet   No No   Sig: Take 1 tablet (4 mg total) by mouth every 8 (eight) hours as needed for nausea   Patient not taking: Reported on 2021   ondansetron (ZOFRAN-ODT) 4 mg disintegrating tablet   No No   Sig: Take 1 tablet (4 mg total) by mouth every 6 (six) hours as needed for nausea or vomiting   Patient not taking: Reported on 10/3/2021   pantoprazole (PROTONIX) 40 mg tablet   Yes No   Sig: Take 40 mg by mouth daily      Facility-Administered Medications: None       Past Medical History:   Diagnosis Date    HIV (human immunodeficiency virus infection) ([facility] [address] )        Past Surgical History:   Procedure Laterality Date     SECTION      CHOLECYSTECTOMY         History reviewed  No pertinent family history  I have reviewed and agree with the history as documented  E-Cigarette/Vaping    E-Cigarette Use Never User      E-Cigarette/Vaping Substances    Nicotine No     THC No     CBD No     Flavoring No     Other No     Unknown No      Social History     Tobacco Use    Smoking status: Never Smoker    Smokeless tobacco: Never Used   Vaping Use    Vaping Use: Never used   Substance Use Topics    Alcohol use: Yes     Comment: Pt reports that she drinks 3-4 bottles of Leonela per day  Pt states that the size of the bottle is 1/5    Drug use: Yes     Types: Marijuana        Review of Systems   Constitutional: Positive for fatigue  Negative for chills and fever  HENT: Negative for ear pain and sore throat  Eyes: Negative for pain and visual disturbance     Respiratory: Negative for cough and shortness of breath  Cardiovascular: Negative for chest pain and palpitations  Gastrointestinal: Positive for nausea and vomiting  Negative for abdominal pain  Genitourinary: Negative for dysuria and hematuria  Musculoskeletal: Positive for myalgias  Negative for arthralgias and back pain  Skin: Negative for color change and rash  Neurological: Negative for seizures and syncope  All other systems reviewed and are negative  Physical Exam  ED Triage Vitals   Temperature Pulse Respirations Blood Pressure SpO2   12/24/21 2008 12/24/21 2008 12/24/21 2008 12/24/21 2008 12/24/21 2008   98 6 °F (37 °C) 97 18 124/67 100 %      Temp Source Heart Rate Source Patient Position - Orthostatic VS BP Location FiO2 (%)   12/24/21 2008 12/24/21 2008 12/24/21 2008 12/24/21 2008 --   Tympanic Monitor Sitting Right arm       Pain Score       12/24/21 2057       9             Orthostatic Vital Signs  Vitals:    12/24/21 2008   BP: 124/67   Pulse: 97   Patient Position - Orthostatic VS: Sitting       Physical Exam  Vitals and nursing note reviewed  Constitutional:       General: She is not in acute distress  HENT:      Head: Normocephalic and atraumatic  Right Ear: External ear normal       Left Ear: External ear normal       Nose: Nose normal       Mouth/Throat:      Pharynx: Oropharynx is clear  Eyes:      Extraocular Movements: Extraocular movements intact  Pupils: Pupils are equal, round, and reactive to light  Cardiovascular:      Rate and Rhythm: Regular rhythm  Tachycardia present  Pulses: Normal pulses  Heart sounds: Normal heart sounds  No murmur heard  Pulmonary:      Effort: Pulmonary effort is normal  No respiratory distress  Breath sounds: Normal breath sounds  No wheezing, rhonchi or rales  Abdominal:      General: Abdomen is flat  Palpations: Abdomen is soft  Tenderness: There is no abdominal tenderness  There is no guarding or rebound     Musculoskeletal: General: No deformity  Normal range of motion  Cervical back: Normal range of motion  Right lower leg: No edema  Left lower leg: No edema  Skin:     General: Skin is warm and dry  Capillary Refill: Capillary refill takes less than 2 seconds  Findings: No rash  Neurological:      General: No focal deficit present  Mental Status: She is alert        Gait: Gait normal    Psychiatric:         Mood and Affect: Mood normal          ED Medications  Medications   metoclopramide (REGLAN) injection 5 mg (5 mg Intravenous Given 12/24/21 2113)   sodium chloride 0 9 % bolus 1,000 mL (0 mL Intravenous Stopped 12/24/21 2302)   acetaminophen (TYLENOL) tablet 650 mg (650 mg Oral Given 12/24/21 2057)       Diagnostic Studies  Results Reviewed     Procedure Component Value Units Date/Time    COVID/FLU/RSV [[id number]]  (Abnormal) Collected: 12/24/21 2059    Lab Status: Final result Specimen: Nares from Nasopharyngeal Swab Updated: 12/24/21 2220     SARS-CoV-2 Negative     INFLUENZA A PCR Positive     INFLUENZA B PCR Negative     RSV PCR Negative    Narrative:           Urine Microscopic [[id number]]  (Abnormal) Collected: 12/24/21 2113    Lab Status: Final result Specimen: Urine, Other Updated: 12/24/21 2150     RBC, UA 2-4 /hpf      WBC, UA 10-20 /hpf      Epithelial Cells Occasional /hpf      Bacteria, UA None Seen /hpf      Hyaline Casts, UA None Seen /lpf     Comprehensive metabolic panel [[id number]]  (Abnormal) Collected: 12/24/21 2105    Lab Status: Final result Specimen: Blood from Arm, Right Updated: 12/24/21 2137     Sodium 136 mmol/L      Potassium 3 9 mmol/L      Chloride 103 mmol/L      CO2 28 mmol/L      ANION GAP 5 mmol/L      BUN 7 mg/dL      Creatinine 0 71 mg/dL      Glucose 95 mg/dL      Calcium 9 6 mg/dL      Corrected Calcium 10 2 mg/dL      AST 40 U/L      ALT 49 U/L      Alkaline Phosphatase 68 U/L      Total Protein 7 5 g/dL      Albumin 3 2 g/dL      Total Bilirubin 0 23 mg/dL      eGFR 122 ml/min/1 73sq m     Narrative:      [place] guidelines for Chronic Kidney Disease (CKD):     Stage 1 with normal or high GFR (GFR > 90 mL/min/1 73 square meters)    Stage 2 Mild CKD (GFR = 60-89 mL/min/1 73 square meters)    Stage 3A Moderate CKD (GFR = 45-59 mL/min/1 73 square meters)    Stage 3B Moderate CKD (GFR = 30-44 mL/min/1 73 square meters)    Stage 4 Severe CKD (GFR = 15-29 mL/min/1 73 square meters)    Stage 5 End Stage CKD (GFR <15 mL/min/1 73 square meters)  Note: GFR calculation is accurate only with a steady state creatinine    Urine culture [[id number]] Collected: 12/24/21 2113    Lab Status:  In process Specimen: Urine, Other Updated: 12/24/21 2120    CBC and differential [[id number]] Collected: 12/24/21 2105    Lab Status: Final result Specimen: Blood from Arm, Right Updated: 12/24/21 2114     WBC 8 03 Thousand/uL      RBC 4 58 Million/uL      Hemoglobin 12 5 g/dL      Hematocrit 38 0 %      MCV 83 fL      MCH 27 3 pg      MCHC 32 9 g/dL      RDW 14 9 %      MPV 10 7 fL      Platelets 505 Thousands/uL      nRBC 0 /100 WBCs      Neutrophils Relative 55 %      Immat GRANS % 0 %      Lymphocytes Relative 35 %      Monocytes Relative 10 %      Eosinophils Relative 0 %      Basophils Relative 0 %      Neutrophils Absolute 4 35 Thousands/µL      Immature Grans Absolute 0 02 Thousand/uL      Lymphocytes Absolute 2 79 Thousands/µL      Monocytes Absolute 0 81 Thousand/µL      Eosinophils Absolute 0 03 Thousand/µL      Basophils Absolute 0 03 Thousands/µL     Urine Macroscopic, POC [[id number]]  (Abnormal) Collected: 12/24/21 2113    Lab Status: Final result Specimen: Urine Updated: 12/24/21 2114     Color, UA Yellow     Clarity, UA Clear     pH, UA 6 0     Leukocytes, UA Small     Nitrite, UA Negative     Protein, UA Negative mg/dl      Glucose, UA Negative mg/dl      Ketones, UA Negative mg/dl      Urobilinogen, UA 0 2 E U /dl      Bilirubin, UA Negative Blood, UA Small     Specific [place], UA 1 015    Narrative:      CLINITEK RESULT                 No orders to display         Procedures  POC Pelvic US    Date/Time: 12/24/2021 8:53 PM  Performed by: Erin Kong MD  Authorized by: Erin Kong MD     Patient location:  ED  Procedure details:     Exam Type:  Diagnostic    Indications: evaluate for IUP      Assessment for: evaluate fetal viability      Technique:  Transabdominal obstetric (HCG+) exam    Views obtained: uterus (transverse and sagittal)      Image quality: limited diagnostic      Image availability:  Images available in PACS  Uterine findings:     Intrauterine pregnancy: identified      Single gestation: identified      Fetal heart rate: identified      Fetal heart rate (bpm):  142  Right adnexa findings:     Right ovary:  Not visualized  Left adnexa findings:     Left ovary:  Not visualized  Interpretation:     Ultrasound impressions: normal      Pregnancy findings: intrauterine pregnancy (IUP)            ED Course  ED Course as of 12/25/21 1828   Fri Dec 24, 2021   2133 Ketones, UA: Negative                             SBIRT 20yo+      Most Recent Value   SBIRT (25 yo +)    In order to provide better care to our patients, we are screening all of our patients for alcohol and drug use  Would it be okay to ask you these screening questions? Yes Filed at: 12/24/2021 2303   Initial Alcohol Screen: US AUDIT-C     1  How often do you have a drink containing alcohol? 0 Filed at: 12/24/2021 2303   2  How many drinks containing alcohol do you have on a typical day you are drinking? 0 Filed at: 12/24/2021 2303   3b  FEMALE Any Age, or MALE 65+: How often do you have 4 or more drinks on one occassion? 0 Filed at: 12/24/2021 2303   Audit-C Score 0 Filed at: 12/24/2021 2303   PUMA: How many times in the past year have you    Used an illegal drug or used a prescription medication for non-medical reasons?  Never Filed at: 12/24/2021 2303 MDM  Number of Diagnoses or Management Options  Influenza A virus present  Diagnosis management comments: Patient is a 24 /o F  at 17 weeks presenting with viral symptoms including myalgias, vomiting, malaise  IUP with normal heart tones identified on ultrasound  Will resuscitate with fluids, check labs and urine  Reglan for nausea  Pt found influenza+, COVID negative  Advised to follow up with her OB and PCP as needed  Disposition  Final diagnoses:   Influenza A virus present     Time reflects when diagnosis was documented in both MDM as applicable and the Disposition within this note     Time User Action Codes Description Comment    2021 10:45 PM Dimple AMOS Add [J10 1] Influenza A virus present       ED Disposition     ED Disposition Condition Date/Time Comment    Discharge Stable Fri Dec 24, 2021 10:44 PM Anatoliy Bailey discharge to home/self care  Follow-up Information     Follow up With Specialties Details Why Contact Info    your OB and PCP  Call             Discharge Medication List as of 2021 10:48 PM      START taking these medications    Details   !! acetaminophen (TYLENOL) 325 mg tablet Take 2 tablets (650 mg total) by mouth every 6 (six) hours as needed for mild pain, Starting Fri 2021, Print       !! - Potential duplicate medications found  Please discuss with provider        CONTINUE these medications which have NOT CHANGED    Details   Prenatal Vit-Fe Fumarate-FA (Prenatal Vitamin) 27-0 8 MG TABS Take 1 tablet by mouth daily, Starting Sun 2021, Normal      !! acetaminophen (TYLENOL) 500 mg tablet Take 1 tablet (500 mg total) by mouth every 6 (six) hours as needed for mild pain, Starting Sun 1/3/2021, Print      bictegravir-emtricitab-tenofovir alafenamide (BIKTARVY) -25 MG tablet Take 1 tablet by mouth daily, Starting Fri 10/11/2019, Historical Med      elvitegravir-cobicistat-emtricitabine-tenofovir alafenamide (Raymond Cardona) tablet Take 1 tablet by mouth daily , Starting Mon 12/31/2018, Historical Med      fluticasone (FLONASE) 50 mcg/act nasal spray 1 spray into each nostril daily, Starting Mon 5/17/2021, Print      hyoscyamine (ANASPAZ,LEVSIN) 0 125 MG tablet Take 1 tablet (0 125 mg total) by mouth every 4 (four) hours as needed for cramping, Starting Sat 2/22/2020, Print      ibuprofen (MOTRIN) 600 mg tablet Take 1 tablet (600 mg total) by mouth every 6 (six) hours as needed for mild pain, Starting Tue 8/10/2021, Normal      loratadine (CLARITIN) 10 mg tablet Take 1 tablet (10 mg total) by mouth daily, Starting Mon 5/17/2021, Print      nitrofurantoin (MACROBID) 100 mg capsule Take 1 capsule (100 mg total) by mouth 2 (two) times a day, Starting Fri 5/28/2021, Normal      !! ondansetron (ZOFRAN-ODT) 4 mg disintegrating tablet Take 1 tablet (4 mg total) by mouth every 8 (eight) hours as needed for nausea, Starting Sat 2/22/2020, Print      !! ondansetron (ZOFRAN-ODT) 4 mg disintegrating tablet Take 1 tablet (4 mg total) by mouth every 6 (six) hours as needed for nausea or vomiting, Starting Fri 5/28/2021, Normal      pantoprazole (PROTONIX) 40 mg tablet Take 40 mg by mouth daily, Starting Fri 10/11/2019, Until Sat 10/10/2020, Historical Med       !! - Potential duplicate medications found  Please discuss with provider  No discharge procedures on file  PDMP Review     None           ED Provider  Attending physically available and evaluated Keena Viviane SALDANA managed the patient along with the ED Attending      Electronically Signed by         Kristal Lopez MD  12/25/21 0837

## 2021-12-25 NOTE — ED ATTENDING ATTESTATION
2021  IOri DO, saw and evaluated the patient  I have discussed the patient with the resident/non-physician practitioner and agree with the resident's/non-physician practitioner's findings, Plan of Care, and MDM as documented in the resident's/non-physician practitioner's note, except where noted  All available labs and Radiology studies were reviewed  I was present for key portions of any procedure(s) performed by the resident/non-physician practitioner and I was immediately available to provide assistance  At this point I agree with the current assessment done in the Emergency Department  I have conducted an independent evaluation of this patient a history and physical is as follows:    19-year-old  at 17 weeks gestation presents with vomiting and body aches  Patient states she has been vomiting all day and complains of diffuse body aches  Denies vaginal discharge no vaginal bleeding  Denies abdominal pain, no chest pain  Has not had problems with this pregnancy  Is vaccinated for COVID  On exam-no acute distress heart regular, no respiratory distress abdomen gravid  Plan-bedside ultrasound shows IUP with fetal heart rate 158 and positive fetal movement, will check urine, labs, give IV fluids, check COVID and flu  ED Course         Critical Care Time  Procedures    Patient tested positive for influenza A  I spoke with patient, explained diagnosis, supportive care  Given return precautions    Advised to follow up with Ob

## 2021-12-25 NOTE — DISCHARGE INSTRUCTIONS
You tested positive for Influenza A and negative for Covid  Return immediately with difficulty breathing, worsening symptoms or any other concerns

## 2021-12-26 LAB
BACTERIA UR CULT: ABNORMAL
BACTERIA UR CULT: ABNORMAL

## 2022-02-01 ENCOUNTER — HOSPITAL ENCOUNTER (OUTPATIENT)
Facility: HOSPITAL | Age: 22
Setting detail: OBSERVATION
Discharge: HOME | End: 2022-02-02
Attending: OBSTETRICS & GYNECOLOGY | Admitting: STUDENT IN AN ORGANIZED HEALTH CARE EDUCATION/TRAINING PROGRAM
Payer: MEDICARE

## 2022-02-01 DIAGNOSIS — O9A.219 TRAUMA DURING PREGNANCY: Primary | ICD-10-CM

## 2022-02-01 NOTE — LETTER
[address] [address] [place] [phone]  Dept: [phone]    February 2, 2022     Patient: Nicole Malik   YOB: 2000   Date of Visit: 2/1/2022       To Whom it May Concern:    Nicole Malik is under my professional care  She was seen in the hospital from 2/1/2022   to 02/02/22  She  She may return to work without limitations on 2/4/2022  If you have any questions or concerns, please don't hesitate to call           Sincerely,          Radha Parker MD

## 2022-02-02 ENCOUNTER — TELEPHONE (OUTPATIENT)
Dept: CT IMAGING | Facility: HOSPITAL | Age: 22
End: 2022-02-02

## 2022-02-02 ENCOUNTER — APPOINTMENT (EMERGENCY)
Dept: RADIOLOGY | Facility: HOSPITAL | Age: 22
End: 2022-02-02
Payer: MEDICARE

## 2022-02-02 VITALS
HEART RATE: 75 BPM | TEMPERATURE: 97.5 F | SYSTOLIC BLOOD PRESSURE: 105 MMHG | DIASTOLIC BLOOD PRESSURE: 59 MMHG | RESPIRATION RATE: 20 BRPM | OXYGEN SATURATION: 100 %

## 2022-02-02 PROBLEM — A59.01 TRICHOMONAL VAGINITIS DURING PREGNANCY: Status: ACTIVE | Noted: 2022-02-02

## 2022-02-02 PROBLEM — J10.1 INFLUENZA DUE TO INFLUENZA A VIRUS: Status: ACTIVE | Noted: 2022-02-02

## 2022-02-02 PROBLEM — Z82.79 FAMILY HISTORY OF CONGENITAL ANOMALY: Status: ACTIVE | Noted: 2022-02-02

## 2022-02-02 PROBLEM — O21.9 NAUSEA/VOMITING IN PREGNANCY: Status: ACTIVE | Noted: 2022-02-02

## 2022-02-02 PROBLEM — Z98.891 HISTORY OF 2 CESAREAN SECTIONS: Status: ACTIVE | Noted: 2022-02-02

## 2022-02-02 PROBLEM — A74.9 CHLAMYDIA INFECTION AFFECTING PREGNANCY: Status: ACTIVE | Noted: 2022-02-02

## 2022-02-02 PROBLEM — R82.71 GBS BACTERIURIA: Status: ACTIVE | Noted: 2022-02-02

## 2022-02-02 PROBLEM — Z87.59 HISTORY OF PRE-ECLAMPSIA: Status: ACTIVE | Noted: 2022-02-02

## 2022-02-02 PROBLEM — Z86.19 HISTORY OF TOXOPLASMOSIS: Status: ACTIVE | Noted: 2022-02-02

## 2022-02-02 PROBLEM — J40 BRONCHITIS: Status: ACTIVE | Noted: 2022-02-02

## 2022-02-02 PROBLEM — F31.9 BIPOLAR AFFECTIVE DISORDER (HCC): Status: ACTIVE | Noted: 2022-02-02

## 2022-02-02 PROBLEM — O23.599 TRICHOMONAL VAGINITIS DURING PREGNANCY: Status: ACTIVE | Noted: 2022-02-02

## 2022-02-02 PROBLEM — Z3A.23 23 WEEKS GESTATION OF PREGNANCY: Status: ACTIVE | Noted: 2022-02-02

## 2022-02-02 PROBLEM — O98.719 MATERNAL HIV INFECTION DURING PREGNANCY: Status: ACTIVE | Noted: 2022-02-02

## 2022-02-02 PROBLEM — F10.11 HISTORY OF ALCOHOL ABUSE: Status: ACTIVE | Noted: 2022-02-02

## 2022-02-02 PROBLEM — O9A.219 TRAUMA DURING PREGNANCY: Status: ACTIVE | Noted: 2022-02-02

## 2022-02-02 PROBLEM — O98.819 CHLAMYDIA INFECTION AFFECTING PREGNANCY: Status: ACTIVE | Noted: 2022-02-02

## 2022-02-02 LAB
ABO GROUP BLD: NORMAL
ALBUMIN SERPL BCP-MCNC: 3 G/DL (ref 3.5–5)
ALP SERPL-CCNC: 87 U/L (ref 46–116)
ALT SERPL W P-5'-P-CCNC: 34 U/L (ref 12–78)
AMORPH URATE CRY URNS QL MICRO: ABNORMAL /HPF
AMPHETAMINES SERPL QL SCN: NEGATIVE
ANION GAP SERPL CALCULATED.3IONS-SCNC: 10 MMOL/L (ref 4–13)
APTT PPP: 29 SECONDS (ref 23–37)
AST SERPL W P-5'-P-CCNC: 23 U/L (ref 5–45)
BACTERIA UR QL AUTO: ABNORMAL /HPF
BARBITURATES UR QL: NEGATIVE
BASOPHILS # BLD AUTO: 0.04 THOUSANDS/ΜL (ref 0–0.1)
BASOPHILS NFR BLD AUTO: 0 % (ref 0–1)
BENZODIAZ UR QL: NEGATIVE
BILIRUB SERPL-MCNC: 0.29 MG/DL (ref 0.2–1)
BILIRUB UR QL STRIP: NEGATIVE
BLD GP AB SCN SERPL QL: NEGATIVE
BUN SERPL-MCNC: 7 MG/DL (ref 5–25)
CALCIUM ALBUM COR SERPL-MCNC: 9.9 MG/DL (ref 8.3–10.1)
CALCIUM SERPL-MCNC: 9.1 MG/DL (ref 8.3–10.1)
CHLORIDE SERPL-SCNC: 102 MMOL/L (ref 100–108)
CLARITY UR: CLEAR
CO2 SERPL-SCNC: 23 MMOL/L (ref 21–32)
COCAINE UR QL: NEGATIVE
COLOR UR: YELLOW
CREAT SERPL-MCNC: 0.54 MG/DL (ref 0.6–1.3)
EOSINOPHIL # BLD AUTO: 0.06 THOUSAND/ΜL (ref 0–0.61)
EOSINOPHIL NFR BLD AUTO: 0 % (ref 0–6)
ERYTHROCYTE [DISTWIDTH] IN BLOOD BY AUTOMATED COUNT: 13.3 % (ref 11.6–15.1)
FIBRINOGEN PPP-MCNC: 584 MG/DL (ref 227–495)
GFR SERPL CREATININE-BSD FRML MDRD: 134 ML/MIN/1.73SQ M
GLUCOSE SERPL-MCNC: 84 MG/DL (ref 65–140)
GLUCOSE UR STRIP-MCNC: NEGATIVE MG/DL
HCT VFR BLD AUTO: 33.8 % (ref 34.8–46.1)
HGB BLD-MCNC: 11.4 G/DL (ref 11.5–15.4)
HGB UR QL STRIP.AUTO: ABNORMAL
IMM GRANULOCYTES # BLD AUTO: 0.03 THOUSAND/UL (ref 0–0.2)
IMM GRANULOCYTES NFR BLD AUTO: 0 % (ref 0–2)
INR PPP: 0.93 (ref 0.84–1.19)
KETONES UR STRIP-MCNC: NEGATIVE MG/DL
LACTATE SERPL-SCNC: 0.9 MMOL/L (ref 0.5–2)
LEUKOCYTE ESTERASE UR QL STRIP: NEGATIVE
LYMPHOCYTES # BLD AUTO: 4.35 THOUSANDS/ΜL (ref 0.6–4.47)
LYMPHOCYTES NFR BLD AUTO: 32 % (ref 14–44)
MCH RBC QN AUTO: 28.6 PG (ref 26.8–34.3)
MCHC RBC AUTO-ENTMCNC: 33.7 G/DL (ref 31.4–37.4)
MCV RBC AUTO: 85 FL (ref 82–98)
METHADONE UR QL: NEGATIVE
MONOCYTES # BLD AUTO: 0.83 THOUSAND/ΜL (ref 0.17–1.22)
MONOCYTES NFR BLD AUTO: 6 % (ref 4–12)
NEUTROPHILS # BLD AUTO: 8.14 THOUSANDS/ΜL (ref 1.85–7.62)
NEUTS SEG NFR BLD AUTO: 62 % (ref 43–75)
NITRITE UR QL STRIP: NEGATIVE
NON-SQ EPI CELLS URNS QL MICRO: ABNORMAL /HPF
NRBC BLD AUTO-RTO: 0 /100 WBCS
OPIATES UR QL SCN: NEGATIVE
OXYCODONE+OXYMORPHONE UR QL SCN: NEGATIVE
PCP UR QL: NEGATIVE
PH UR STRIP.AUTO: 7 [PH]
PLATELET # BLD AUTO: 236 THOUSANDS/UL (ref 149–390)
PMV BLD AUTO: 11.3 FL (ref 8.9–12.7)
POTASSIUM SERPL-SCNC: 3.4 MMOL/L (ref 3.5–5.3)
PROT SERPL-MCNC: 7.8 G/DL (ref 6.4–8.2)
PROT UR STRIP-MCNC: ABNORMAL MG/DL
PROTHROMBIN TIME: 12.5 SECONDS (ref 11.6–14.5)
RBC # BLD AUTO: 3.99 MILLION/UL (ref 3.81–5.12)
RBC #/AREA URNS AUTO: ABNORMAL /HPF
RH BLD: POSITIVE
SODIUM SERPL-SCNC: 135 MMOL/L (ref 136–145)
SP GR UR STRIP.AUTO: 1.02 (ref 1–1.03)
SPECIMEN EXPIRATION DATE: NORMAL
THC UR QL: NEGATIVE
UROBILINOGEN UR QL STRIP.AUTO: 0.2 E.U./DL
WBC # BLD AUTO: 13.45 THOUSAND/UL (ref 4.31–10.16)
WBC #/AREA URNS AUTO: ABNORMAL /HPF

## 2022-02-02 PROCEDURE — 86900 BLOOD TYPING SEROLOGIC ABO: CPT | Performed by: OBSTETRICS & GYNECOLOGY

## 2022-02-02 PROCEDURE — 87491 CHLMYD TRACH DNA AMP PROBE: CPT | Performed by: OBSTETRICS & GYNECOLOGY

## 2022-02-02 PROCEDURE — 87147 CULTURE TYPE IMMUNOLOGIC: CPT | Performed by: OBSTETRICS & GYNECOLOGY

## 2022-02-02 PROCEDURE — 87086 URINE CULTURE/COLONY COUNT: CPT | Performed by: OBSTETRICS & GYNECOLOGY

## 2022-02-02 PROCEDURE — 81001 URINALYSIS AUTO W/SCOPE: CPT | Performed by: OBSTETRICS & GYNECOLOGY

## 2022-02-02 PROCEDURE — 86850 RBC ANTIBODY SCREEN: CPT | Performed by: OBSTETRICS & GYNECOLOGY

## 2022-02-02 PROCEDURE — 83605 ASSAY OF LACTIC ACID: CPT

## 2022-02-02 PROCEDURE — 99204 OFFICE O/P NEW MOD 45 MIN: CPT | Performed by: SURGERY

## 2022-02-02 PROCEDURE — 85384 FIBRINOGEN ACTIVITY: CPT | Performed by: OBSTETRICS & GYNECOLOGY

## 2022-02-02 PROCEDURE — 85730 THROMBOPLASTIN TIME PARTIAL: CPT | Performed by: OBSTETRICS & GYNECOLOGY

## 2022-02-02 PROCEDURE — 87591 N.GONORRHOEAE DNA AMP PROB: CPT | Performed by: OBSTETRICS & GYNECOLOGY

## 2022-02-02 PROCEDURE — 86901 BLOOD TYPING SEROLOGIC RH(D): CPT | Performed by: OBSTETRICS & GYNECOLOGY

## 2022-02-02 PROCEDURE — 99215 OFFICE O/P EST HI 40 MIN: CPT

## 2022-02-02 PROCEDURE — NC001 PR NO CHARGE: Performed by: STUDENT IN AN ORGANIZED HEALTH CARE EDUCATION/TRAINING PROGRAM

## 2022-02-02 PROCEDURE — 72072 X-RAY EXAM THORAC SPINE 3VWS: CPT

## 2022-02-02 PROCEDURE — NC001 PR NO CHARGE: Performed by: PHYSICIAN ASSISTANT

## 2022-02-02 PROCEDURE — 85610 PROTHROMBIN TIME: CPT | Performed by: OBSTETRICS & GYNECOLOGY

## 2022-02-02 PROCEDURE — 99218 PR INITIAL OBSERVATION CARE/DAY 30 MINUTES: CPT | Performed by: STUDENT IN AN ORGANIZED HEALTH CARE EDUCATION/TRAINING PROGRAM

## 2022-02-02 PROCEDURE — 85025 COMPLETE CBC W/AUTO DIFF WBC: CPT | Performed by: OBSTETRICS & GYNECOLOGY

## 2022-02-02 PROCEDURE — 93005 ELECTROCARDIOGRAM TRACING: CPT

## 2022-02-02 PROCEDURE — 80307 DRUG TEST PRSMV CHEM ANLYZR: CPT | Performed by: OBSTETRICS & GYNECOLOGY

## 2022-02-02 PROCEDURE — 72100 X-RAY EXAM L-S SPINE 2/3 VWS: CPT

## 2022-02-02 PROCEDURE — 80053 COMPREHEN METABOLIC PANEL: CPT | Performed by: OBSTETRICS & GYNECOLOGY

## 2022-02-02 RX ORDER — ACETAMINOPHEN 325 MG/1
650 TABLET ORAL EVERY 6 HOURS PRN
Status: DISCONTINUED | OUTPATIENT
Start: 2022-02-02 | End: 2022-02-02 | Stop reason: HOSPADM

## 2022-02-02 NOTE — PROCEDURES
POC FAST US    Date/Time: 2/2/2022 6:15 AM  Performed by: Tianna Victor PA-C  Authorized by: Tianna Victor PA-C     Patient location:  Trauma  Procedure details:     Exam Type:  Diagnostic    Indications: blunt abdominal trauma and blunt chest trauma      Assess for:  Intra-abdominal fluid and pericardial effusion    Technique: FAST      Views obtained:  Heart - Pericardial sac, LUQ - Splenorenal space, Suprapubic - Pouch of Nate and RUQ - Castañeda's Pouch    Image quality: diagnostic      Image availability:  Not saved  FAST Findings:     RUQ (Hepatorenal) free fluid: absent      LUQ (Splenorenal) free fluid: absent      Suprapubic free fluid: absent      Cardiac wall motion: identified      Pericardial effusion: absent    Interpretation:     Impressions: negative

## 2022-02-02 NOTE — PROGRESS NOTES
Progress Note - Tertiary Trauma [place]   Annetta Esteves 25 y o  female MRN: [id number]  Unit/Bed#: LD TRIAGE 4-01 Encounter: [id number]    Summary of Diagnosed Injuries: No injuries found on tertiary exam, abdominal pain improved, spine pain improved  Clinical Plan: Repeat abdominal exam benign with soft abdomen and no tenderness, repeat FAST exam neg  Vitals stable overnight  Await final read on lumbar and thoracic spine x-rays, trial diet, if xrays neg and patient able to tolerate diet okay for d/c from trauma stand point      Prophylaxis: Sequential compression device (Venodyne)     Disposition: home pending final x-ray reads    Code status:  Level 1 - Full Code    Consultants: Trauma with OB primary      SUBJECTIVE:     Transfer from: no transfer  Mechanism of Injury:Fall    Chief Complaint: no complaints    HPI/Last 24 hour events: Reports she is feeling much better this am, no other complaints  Denies headache, dizziness, n/v  Active medications:           Current Facility-Administered Medications:     acetaminophen (TYLENOL) tablet 650 mg, 650 mg, Oral, Q6H PRN      OBJECTIVE:     Vitals:   Vitals:    02/02/22 0245   BP: 112/82   Pulse: 97   Resp:    Temp:    SpO2:        Physical Exam:   GENERAL APPEARANCE: no acute distress sleeping lateral in bed  NEURO: AAOX3, GCS 15, no focal neuro deficit  HEENT: PERRL, EOMI, mucus membranes moist  CV: RRR S1 S2 without murmur, rub, or gallop  LUNGS: Clear to ausc bilaterally without wheezes, crackles, or rhonchi  GI: soft, non-tender, non-distended, gravid uterus, umbilical hernia  : voiding independently  MSK: non-tender without deformity, FAROM x 4 extremities  SKIN: warm, dry, intact              I/O:   I/O     None          Invasive Devices:    Invasive Devices  Report    None                   Imaging:   Lumbar spine: pending  Thoracic spine: pending  Labs:   CBC:   Lab Results   Component Value Date    WBC 13 45 (H) 02/02/2022    HGB 11 4 (L) 02/02/2022    HCT 33 8 (L) 02/02/2022    MCV 85 02/02/2022     02/02/2022    MCH 28 6 02/02/2022    MCHC 33 7 02/02/2022    RDW 13 3 02/02/2022    MPV 11 3 02/02/2022    NRBC 0 02/02/2022     CMP:   Lab Results   Component Value Date     02/02/2022    CO2 23 02/02/2022    BUN 7 02/02/2022    CREATININE 0 54 (L) 02/02/2022    CALCIUM 9 1 02/02/2022    AST 23 02/02/2022    ALT 34 02/02/2022    ALKPHOS 87 02/02/2022    EGFR 134 02/02/2022

## 2022-02-02 NOTE — ASSESSMENT & PLAN NOTE
Admit for observation  Appreciate trauma service following  Continued to recommend CT abd/pelvis to rule out intra-abdominal process/bleeding, spinal fractures, etc    Patient continued to decline  X-ray spine series ordered by Trauma  Serial abdominal exams  NPO for now until cleared by trauma   Tylenol for pain PRN

## 2022-02-02 NOTE — PROGRESS NOTES
L&D Triage Note - OB/GYN  Annetta Esteves 25 y o  female MRN: [id number]  Unit/Bed#: LD TRIAGE 4-01 Encounter: [id number]      ASSESSMENT:    Annetta Esteves is a 25 y o  [id number] at [address] (dating by [address] at [facility]) is presenting with acute abdominal pain, an episode of syncope with collapse and pink vaginal discharge  On exam the patient has nonlocalized abdominal tenderness with rigidity and guarding and is tachycardic  The patient's PMH is complicated by HIV (on ART), recent chlamydia infection, homelessness, recent EtOH abuse history and many social concerns  PLAN:    1) Abdominal Pain   - CBC, CMP, Lactate, CBC, Type & Screen, Coags   - CT abdomen &Pelvis with contrast recommended d/t rigidity and nonlocalized acute abdominal pain   - NST   - Transabdominal US   - Given negative obstetric workup, patient discharged from an OB standpoint and care turned over to trauma team   2) Syncope with collapse   - ECG   - Metabolic and Blood chemistries as above   - Urinalysis w/ reflex to scope  3) Vaginal discharge   - Speculum exam with JACK, NS slides   - Nitrazine strip of vaginal discharge    Dr Shahid Shine was present at bedside during the evaluation of this patient and was available for consult and guidance in developing this plan  CT Abdomen and Pelvis was recommended to the patient, however patient is refusing at this time due to risk of harm to the fetus  Patient understands that the risk of forgoing CT scan are delayed or missed critical diagnoses and that due to her severe pain on abdominal exam, a delay in diagnosis may lead to severe morbidity or even death  Patient still wishes to forego CT scan at this time  There is not reasonable available imaging modality at this time that will sufficiently evaluate the differential diagnoses of this patient's presentation    At this time, based on her exam below, the patient has capacity to make her own medical decisions and CT will be deferred at this time d/t patient request     SUBJECTIVE:    Lea Weber  [id number] at [facility] 23w2d  with an Estimated Date of Delivery: 2022 presented to the [facility] triage this morning with a chief complaint of acute abdominal pain, syncope and vaginal discharge  She states that her abdominal pain began this morning when she woke up  Initially this pain was localized to the bilateral pelvic region with radiation into the groin that was described as "sharp, shooting"  This pain persisted through the morning and afternoon  This evening, the patient was at work when she began feeling "lightheaded"  She says that she went to the bathroom, sat on the toilet for a few minutes, and when she stood up and went to the sink she "passed out" falling forward onto the bathroom floor  Around the same time, she noticed an acute worsening of her abdominal pain that was now more diffuse across her entire anterior abdomen  Additionally, she noted a bulging midline mass above her umbilicus that was tender around the borders  The patient noted what she thought was a loss of urine in her underwear at the time that was "pinkish" in color and "watery" in consistency  She is denying any fevers, chills, n/v/d/c, cough, congestion, rhinorrhea  Her PMH is complicated by HIV for which is on daily ART and reports compliance  Her social history is complicated by homelessness, recent EtOH abuse history and recently starting 2 new jobs while caring for 2 children  Her current obstetrical history is significant for limited prenatal care  Her past obstetrical history is significant for 2 spontaneous abortions and 2 term live births by  in [place]       Contractions: denies  Leakage of fluid: as above in HPI  Vaginal Bleeding: denies anita VB  Fetal movement: present    OBJECTIVE:    Vitals:    22 0245   BP: 112/82   Pulse: 97   Resp:    Temp:    SpO2:        Review of Systems   Constitutional: Positive for fatigue  Negative for chills, diaphoresis and fever  HENT: Negative for congestion, rhinorrhea and sore throat  Respiratory: Negative for cough, chest tightness and shortness of breath  Cardiovascular: Negative for chest pain and palpitations  Gastrointestinal: Positive for abdominal distention and abdominal pain  Negative for constipation, diarrhea, nausea and vomiting  Endocrine: Negative for cold intolerance and heat intolerance  Genitourinary: Positive for decreased urine volume, difficulty urinating, pelvic pain and vaginal discharge  Negative for flank pain  Musculoskeletal: Positive for back pain  Skin: Negative for rash and wound  Allergic/Immunologic: Positive for immunocompromised state  Neurological: Positive for dizziness, syncope, weakness and light-headedness  Negative for seizures, numbness and headaches  Physical Exam  Vitals reviewed  Constitutional:       General: She is in acute distress  Appearance: She is not toxic-appearing or diaphoretic  HENT:      Head: Normocephalic and atraumatic  Right Ear: External ear normal       Left Ear: External ear normal       Nose: Nose normal  No congestion or rhinorrhea  Mouth/Throat:      Mouth: Mucous membranes are moist       Pharynx: Oropharynx is clear  No oropharyngeal exudate or posterior oropharyngeal erythema  Eyes:      General: No scleral icterus  Right eye: No discharge  Left eye: No discharge  Extraocular Movements: Extraocular movements intact  Conjunctiva/sclera: Conjunctivae normal       Pupils: Pupils are equal, round, and reactive to light  Cardiovascular:      Rate and Rhythm: Regular rhythm  Tachycardia present  Pulses: Normal pulses  Heart sounds: No murmur heard  No friction rub  Pulmonary:      Effort: No respiratory distress  Breath sounds: Normal breath sounds  No wheezing or rales  Abdominal:      General: There is distension  Palpations:  There is mass  Tenderness: There is abdominal tenderness  There is guarding and rebound  There is no right CVA tenderness or left CVA tenderness  Hernia: A hernia is present  Comments: Large ventral hernia apparent on exam  Reducible, tenderness in surrounding tissue  No evident bruising  Non-localized abdominal tenderness diffuse  Guarding present in all 4 quadrants, lower > upper   Genitourinary:     General: Normal vulva  Labia:         Right: No rash, tenderness or lesion  Left: No rash, tenderness or lesion  Vagina: Normal       Cervix: Discharge present  Musculoskeletal:         General: Tenderness present  No swelling or deformity  Normal range of motion  Cervical back: Normal range of motion and neck supple  No rigidity or tenderness  Comments: Point Tenderness over lower lumbar paraspinal region   Skin:     Capillary Refill: Capillary refill takes less than 2 seconds  Coloration: Skin is not jaundiced  Findings: No bruising or rash  Neurological:      General: No focal deficit present  Mental Status: She is alert and oriented to person, place, and time  Cranial Nerves: No cranial nerve deficit  Sensory: No sensory deficit  Motor: No weakness  Cervical Exam  Speculum: Pelvic exam: VULVA: normal appearing vulva with no masses, tenderness or lesions, VAGINA: normal appearing vagina with normal color and discharge, no lesions, CERVIX: normal appearing cervix without discharge or lesions  Fetal monitoring:  FHR: 140bpm baseline, reactive  [place]: no contractions evident    KOH/WTMT:     Infection:   - No clue cells    - No hyphae   - No trichomonads present    Membrane status   - No ferning   - Equivical nitrazene   - No pooling     Urine Dip    - Large blood, negative nitrates, negative bacteria, negative leukocytes, trace protein        Imaging:       TVUS   - Cervical length    - 3 44cm    - 3 27cm    - 3 61cm   - Presentation: breech        Abd  US   MVP:    - Total: 6 5 cm                               Recent Labs     02/02/22  0123   HGB 11 4*   HCT 33 8*      MCV 85   WBC 13 45*   SODIUM 135*   K 3 4*      CO2 23   BUN 7   CREATININE 0 54*   AST 23   ALT 34   ALKPHOS 87   ALB 3 0*           Karri Lam MD  EM PGY-1  2/2/2022 4:15 AM

## 2022-02-02 NOTE — PROCEDURES
POC FAST US    Date/Time: 2/2/2022 2:30 AM  Performed by: Jolene Mireles PA-C  Authorized by: Jolene Mireles PA-C     Patient location:  Trauma  Procedure details:     Exam Type:  Diagnostic    Indications: blunt abdominal trauma and blunt chest trauma      Assess for:  Intra-abdominal fluid and pericardial effusion    Technique: FAST      Views obtained:  Heart - Pericardial sac, LUQ - Splenorenal space, Suprapubic - Pouch of Nate and RUQ - Castañeda's Pouch    Image quality: diagnostic      Image availability:  Not saved  FAST Findings:     RUQ (Hepatorenal) free fluid: absent      LUQ (Splenorenal) free fluid: absent      Suprapubic free fluid: absent      Cardiac wall motion: identified      Pericardial effusion: absent    Interpretation:     Impressions: negative

## 2022-02-02 NOTE — CONSULTS
Consultation Exam - Trauma   Loyd Law 25 y o  female MRN: [id number]  Unit/Bed#: LD TRIAGE 4-01 Encounter: [id number]    Assessment/Plan   Trauma Alert: Evaluation  Model of Arrival: Ambulance  Trauma Team: Attending Brittani Rapp and AP Gerlean Barthel  Consultants: OB     Trauma Active Problems:  Syncopal fall with blunt abdominal trauma  Abdominal tenderness  Lumbar spine tenderness    Trauma Plan:   Recommend CT abdomen and pelvis - discussed with patient and Dr Meño Abad who also explained the recommendation of an abdominal/pelvis ct scan to the patient however patient concerned about risk to baby and at this time declines ct scan  Patient is agreeable to observation, serial abdominal exams, and lumbar/thoracic spinal x-rays  Will reevaluate with repeat FAST and tertiary trauma exam  Syncope workup per OB    Chief Complaint: syncope and collapse    History of Present Illness   HPI:  Loyd Law is a 25 y o  female who presents after syncopal fall while at work  She reports she felt like she was going to pass out and then woke up laying on her belly  The fall was not witnessed however coworkers told her she was probably passed out for about 15 minutes  She denies headache, dizziness, nausea, vomiting, light sensitivity, cervical spine pain, cramping  She denies taking AC/AP  Mechanism:Fall    Review of Systems   Constitutional: Negative for activity change, appetite change, diaphoresis, fatigue and fever  HENT: Negative for congestion, facial swelling, nosebleeds, postnasal drip, rhinorrhea, sinus pressure, sinus pain and sore throat  Eyes: Negative for photophobia and visual disturbance  Respiratory: Negative for cough, shortness of breath and wheezing  Cardiovascular: Negative for chest pain and palpitations  Gastrointestinal: Negative for abdominal distention, abdominal pain, blood in stool, constipation, diarrhea, nausea and vomiting     Genitourinary: Negative for decreased urine volume, dysuria, flank pain, frequency and hematuria  Musculoskeletal: Negative for arthralgias, back pain and neck stiffness  Skin: Negative for wound  Neurological: Negative for dizziness, seizures, syncope, facial asymmetry, weakness, light-headedness, numbness and headaches  12-point, complete review of systems was reviewed and negative except as stated above  Historical Information     Past Medical History:   Diagnosis Date    HIV (human immunodeficiency virus infection) ([facility] [address] )      Past Surgical History:   Procedure Laterality Date     SECTION      CHOLECYSTECTOMY       Social History   Social History     Substance and Sexual Activity   Alcohol Use Yes    Comment: Pt reports that she drinks 3-4 bottles of Leonela per day   Pt states that the size of the bottle is 1/5     Social History     Substance and Sexual Activity   Drug Use Yes    Types: Marijuana     Social History     Tobacco Use   Smoking Status Never Smoker   Smokeless Tobacco Never Used     E-Cigarette/Vaping    E-Cigarette Use Never User      E-Cigarette/Vaping Substances    Nicotine No     THC No     CBD No     Flavoring No     Other No     Unknown No      Immunization History   Administered Date(s) Administered    COVID-19 MODERNA VACC 0 5 ML IM 2021, 2022    Tdap 09/10/2019, 2020, 2021     Last Tetanus:   Family History: Non-contributory      Meds/Allergies   all current active meds have been reviewed    No Known Allergies      PHYSICAL EXAM      Objective   Vitals:   First set: Temperature: 98 6 °F (37 °C) (22)  Pulse: 105 (22)  Respirations: 20 (22)  Blood Pressure: 128/78 (22)    Primary Survey:   (A) Airway: Intact  (B) Breathing: equal bilaterally  (C) Circulation: Pulses:   pedal  2/4, radial  2/4 and femoral  2/4  (D) Disabliity:  GCS Total:  15  (E) Expose:  Completed    Secondary Survey: (Click on Physical Exam tab above)  Physical Exam  Vitals and nursing note reviewed  Constitutional:       General: She is not in acute distress  Appearance: Normal appearance  She is normal weight  She is not ill-appearing or toxic-appearing  HENT:      Head: Normocephalic and atraumatic  Nose: Nose normal  No congestion or rhinorrhea  Mouth/Throat:      Mouth: Mucous membranes are moist       Pharynx: Oropharynx is clear  No oropharyngeal exudate or posterior oropharyngeal erythema  Eyes:      Extraocular Movements: Extraocular movements intact  Conjunctiva/sclera: Conjunctivae normal       Pupils: Pupils are equal, round, and reactive to light  Cardiovascular:      Rate and Rhythm: Normal rate and regular rhythm  Heart sounds: No murmur heard  No friction rub  No gallop  Pulmonary:      Effort: Pulmonary effort is normal       Breath sounds: Normal breath sounds  No wheezing, rhonchi or rales  Abdominal:      General: Abdomen is protuberant  Bowel sounds are normal  There is no distension  Palpations: Abdomen is soft  Tenderness: There is generalized abdominal tenderness  There is guarding (voluntary)  There is no rebound  Hernia: A hernia is present  Hernia is present in the umbilical area (large periumbilical hernia approximately 7cm)  Musculoskeletal:      Right shoulder: Normal       Left shoulder: Normal       Right upper arm: Normal       Left upper arm: Normal       Right elbow: Normal       Left elbow: Normal       Right forearm: Normal       Left forearm: Normal       Right wrist: Normal       Left wrist: Normal       Right hand: Normal       Left hand: Normal       Cervical back: Normal range of motion  No rigidity or tenderness  Thoracic back: No deformity or tenderness  Lumbar back: Tenderness present  No deformity        Right hip: Normal       Left hip: Normal       Right upper leg: Normal       Left upper leg: Normal       Right knee: Normal       Left knee: Normal       Right lower leg: Normal       Left lower leg: Normal       Right ankle: Normal       Left ankle: Normal       Right foot: Normal       Left foot: Normal    Skin:     General: Skin is warm and dry  Capillary Refill: Capillary refill takes less than 2 seconds  Findings: No bruising  Neurological:      General: No focal deficit present  Mental Status: She is alert and oriented to person, place, and time  Sensory: No sensory deficit  Motor: No weakness           Invasive Devices  Report    None                 Lab Results:   BMP/CMP:   Lab Results   Component Value Date    SODIUM 135 (L) 02/02/2022    K 3 4 (L) 02/02/2022     02/02/2022    CO2 23 02/02/2022    BUN 7 02/02/2022    CREATININE 0 54 (L) 02/02/2022    CALCIUM 9 1 02/02/2022    AST 23 02/02/2022    ALT 34 02/02/2022    ALKPHOS 87 02/02/2022    EGFR 134 02/02/2022   , CBC:   Lab Results   Component Value Date    WBC 13 45 (H) 02/02/2022    HGB 11 4 (L) 02/02/2022    HCT 33 8 (L) 02/02/2022    MCV 85 02/02/2022     02/02/2022    MCH 28 6 02/02/2022    MCHC 33 7 02/02/2022    RDW 13 3 02/02/2022    MPV 11 3 02/02/2022    NRBC 0 02/02/2022    and Lactate: No results found for: LACTATE  Imaging/EKG Studies: FAST: Neg, T-Spine: pending, L-Spine: pending  Other Studies: none    Code Status: No Order

## 2022-02-02 NOTE — PROGRESS NOTES
Anatoliy MikeMahinaries 25 y o  [id number] 23w4d who presents for abdominal pain and back pain after unwitnessed syncopal episode     Working at [employer] today (new job)  Felt lightheaded and dizzy so went to restroom and then passed out  She woke up face and abdomen down on floor, unsure if she hit her head, abdomen, back, but with new abdominal and back pain  Reports no current lightheadedness or dizziness but ongoing abdominal pain  No VB, no LOF, no contractions, +FM    Vitals:    22 2330   BP: 128/78   Pulse: 105   Resp: 20   Temp: 98 6 °F (37 °C)   SpO2: 100%     Abdomen with large [id number] herniation, uterus protruding through fasical herniation  Mild ttp diffusely, voluntary guarding  SSE cervix closed, long, posterior, white discharge in vault  Neg pool/nitrazine/fern  Wet mount neg for trich, clue cells, yeast, whiff    TVUS wnl  TAUS confirms good fetal movement, grossly normal fluid  Fast scan negative    Discussed recommendation for CT to assess for further abdominal trauma, internal bleeding, reviewed potential  risk of childhood leukemia, which patient reports is unwilling to accept  Explained concern that if undiagnosed severe trauma or bleed, may not discover until too late requiring urgent surgery, which if not quick enough could lead to risk of death   Patient understands, but concerned that if CT normal, then she would have to live with guilt and concern of having exposed her fetus to radiation and possibly causing cancer    Trauma surgery bedside eval: CT recommended, but if continues to decline, recommend spine x-ray, serial abdominal exams, and repeat FAST scan    Aditya Barlow MD   22   2:36 AM

## 2022-02-02 NOTE — ASSESSMENT & PLAN NOTE
Concern for thin lower uterine segment on ultrasound, but overall does appear intact  Plans for RLTCS

## 2022-02-02 NOTE — H&P
History & Physical - OB/GYN   Toy Riddle 25 y o  female MRN: [id number]  Unit/Bed#: LD [facility] 4-01 Encounter: [id number]    ObGyn Provider:  [facility]     Chief complaint:  Corbin Leonel at work     HPI:  Toy Riddle is a 25 y o  [id number] female at 24w1d, NADIRA 5/30/2022, who presented to the [facility] triage this morning with a chief complaint of abdominal pain after a syncopal episode at work approximately 4 hours PTA which caused her to fall forward  She states that her abdominal pain actually began in the morning when she initially woke up  Initially this pain was localized to the bilateral pelvic region with radiation into the groin that was described as "sharp, shooting"  This pain persisted through the morning and afternoon  This evening, the patient was at work when she began feeling "lightheaded"  She says that she went to the bathroom, sat on the toilet for a few minutes, and when she stood up she "passed out" falling forward onto the bathroom floor  Around the same time, she noticed an acute worsening of her abdominal pain that was now more diffuse across her entire anterior abdomen  Additionally, she noted a bulging midline mass above her umbilicus that was tender around the lateral borders  She has never noticed this mass before  Additionally, the patient noted what she thought was a loss of urine in her underwear at the time that was "pinkish" in color and "watery" in consistency  She is denying any fevers, chills, n/v/d/c, cough, congestion, rhinorrhea  Her PMH is complicated by HIV for which is on daily ART and reports compliance  Her social history is complicated by homelessness, recent EtOH abuse history and recently starting 2 new jobs while caring for 2 children  Review of Systems:   Review of Systems   Constitutional: Positive for fatigue  Negative for chills, diaphoresis and fever  HENT: Negative for congestion, ear pain and sore throat      Eyes: Negative for pain and visual disturbance  Respiratory: Negative for cough and shortness of breath  Cardiovascular: Negative for chest pain and palpitations  Gastrointestinal: Positive for abdominal pain  Negative for anal bleeding, constipation, diarrhea, nausea and vomiting  Genitourinary: Positive for decreased urine volume, difficulty urinating and pelvic pain  Negative for dysuria and hematuria  Musculoskeletal: Negative for arthralgias and back pain  Skin: Negative for color change and rash  Neurological: Positive for syncope and light-headedness  Negative for seizures and weakness  Psychiatric/Behavioral: Negative for confusion  All other systems reviewed and are negative  Current Pregnancy Problems:  Problem   23 Weeks Gestation of Pregnancy   Influenza Due to Influenza A Virus    + Flu A on       Bronchitis   Gbs Bacteriuria   Trichomonal Vaginitis During Pregnancy   Maternal HIV Infection During Pregnancy    Started ART in pregnancy   From [facility]:   Antiretroviral History: 1  Truvada/Prezista/Norvir [id number]  2  Descovy/Prezcobix [id number] (non-compliant)  3  Genvoya Aug 2018 -> never started  4  Changed to Truvada/Isentress in 2019 (potential for pregnancy)- never started  5  Started on Complera secondary to pregnancy and once daily regimen  6  Switch to KatyFitOrbit 2019 - intermittent compliance since that time     History of Alcohol Abuse   History of 2  Sections   Nausea/Vomiting in Pregnancy   Chlamydia Infection Affecting Pregnancy    Positive 2021, treated 2021   ZINA dur 2021       Bipolar Affective Disorder (Hcc)   History of Toxoplasmosis    IGG pos/IGM neg  Last CD4>1000       Family History of Congenital Anomaly    Sister- congenital scoliosis, corrected in childhood       History of Pre-Eclampsia       Past Medical Hx:  Past Medical History:   Diagnosis Date    HIV (human immunodeficiency virus infection) ([facility] [address] )        OB Hx:  OB History    Para Term  AB Living   5 2 2 0 2 2   SAB IAB Ectopic Multiple Live Births   2 0 0 0 2      # Outcome Date GA Lbr Ghulam/2nd Weight Sex Delivery Anes PTL Lv   5 Current            4 Term 2017     CS-Unspec   GUDELIA   3 Term      CS-Unspec   GUDELIA   2 SAB            1 SAB  14w0d              Past Surgical hx:  Past Surgical History:   Procedure Laterality Date     SECTION      CHOLECYSTECTOMY         Social Hx:  Alcohol use: Recovering EtOH abuse disorder, in AAO  Tobacco use: Past  Other substance use: [employer] until early 2022    Other: Homelessness, currently living in hotel  Recently started working at [employer] where she is required to frequently lift boxes/pallets that are >15lbs repetitively        Current Medications:  Current Outpatient Medications   Medication Instructions    acetaminophen (TYLENOL) 500 mg, Oral, Every 6 hours PRN    acetaminophen (TYLENOL) 650 mg, Oral, Every 6 hours PRN    bictegravir-emtricitab-tenofovir alafenamide (BIKTARVY) -25 MG tablet 1 tablet, Daily    elvitegravir-cobicistat-emtricitabine-tenofovir alafenamide (GENVOYA) tablet 1 tablet, Daily    fluticasone (FLONASE) 50 mcg/act nasal spray 1 spray, Nasal, Daily    hyoscyamine (ANASPAZ,LEVSIN) 0 125 mg, Oral, Every 4 hours PRN    ibuprofen (MOTRIN) 600 mg, Oral, Every 6 hours PRN    loratadine (CLARITIN) 10 mg, Oral, Daily    nitrofurantoin (MACROBID) 100 mg, Oral, 2 times daily    ondansetron (ZOFRAN-ODT) 4 mg, Oral, Every 8 hours PRN    ondansetron (ZOFRAN-ODT) 4 mg, Oral, Every 6 hours PRN    pantoprazole (PROTONIX) 40 mg, Oral, Daily    Prenatal Vit-Fe Fumarate-FA (Prenatal Vitamin) 27-0 8 MG TABS 1 tablet, Oral, Daily       Allergies:  No Known Allergies    Prenatal Labs:  Lab Results   Component Value Date/Time    ABO O 2022 01:23 AM    RH Positive 2022 01:23 AM    ABS Negative 2022 01:23 AM    HGB 11 4 (L) 2022 01:23 AM    HCT 33 8 (L) 2022 01:23 AM     2022 01:23 AM       Blood type: O+  Antibody: negative  Group B strep: unknown  HIV: positive (On ART)  Hepatitis B: negative/ immune  RPR: negative  Rubella: immune  Varicella: unknown  1 hour Glucose: Not yet completed    OBJECTIVE:  /82   Pulse 97   Temp 98 6 °F (37 °C) (Oral)   Resp 20   LMP 08/21/2021   SpO2 100%     Physical Exam  Vitals and nursing note reviewed  Constitutional:       General: She is in acute distress  Appearance: She is well-developed  She is not toxic-appearing or diaphoretic  Comments: Patient examined in obvious discomfort when she shifts position or flexes her rectus abdominus muscle   HENT:      Head: Normocephalic and atraumatic  Right Ear: External ear normal       Left Ear: External ear normal       Nose: Nose normal  No congestion or rhinorrhea  Mouth/Throat:      Mouth: Mucous membranes are moist       Pharynx: Oropharynx is clear  No oropharyngeal exudate or posterior oropharyngeal erythema  Eyes:      General: No scleral icterus  Extraocular Movements: Extraocular movements intact  Conjunctiva/sclera: Conjunctivae normal       Pupils: Pupils are equal, round, and reactive to light  Cardiovascular:      Rate and Rhythm: Regular rhythm  Tachycardia present  Pulses: Normal pulses  Heart sounds: Normal heart sounds  No murmur heard  Pulmonary:      Effort: Pulmonary effort is normal  No respiratory distress  Breath sounds: Normal breath sounds  No wheezing, rhonchi or rales  Abdominal:      General: Abdomen is flat  There is no distension  Palpations: There is mass  Tenderness: There is abdominal tenderness (Diffuse nonlocalized tenderness across the entire abdomen)  There is guarding and rebound  There is no right CVA tenderness or left CVA tenderness  Hernia: A hernia is present        Comments: Rigid abdomen  Tender, nonlocalized + guarding  Ventral hernia noted 5cm x 7cm, reducible, self-reduces on standing Musculoskeletal:         General: Tenderness (Point tenderness over the lower lumbar spine) present  No swelling or deformity  Cervical back: Neck supple  No rigidity  Right lower leg: No edema  Left lower leg: No edema  Lymphadenopathy:      Cervical: No cervical adenopathy  Skin:     General: Skin is warm and dry  Capillary Refill: Capillary refill takes less than 2 seconds  Coloration: Skin is not jaundiced  Findings: No bruising or rash  Neurological:      General: No focal deficit present  Mental Status: She is alert and oriented to person, place, and time  Mental status is at baseline  Motor: No weakness  Psychiatric:         Mood and Affect: Mood normal          Behavior: Behavior normal          There is no height or weight on file to calculate BMI  Cervical Exam:    deferred    Membranes: intact  Presentation: variable    Pregnancy Plan:  No overview and plan has been documented for this pregnancy  FHT:   Baseline Rate: 145 bpm  Variability: Moderate 6-25 bpm  Accelerations: Periodic,10 x 10 (<32 weeks),With fetal movment  Decelerations: None    [place]:  Contraction Frequency (minutes): 0      ASSESSMENT:  25 y o  [id number] at 23w2d ***    PLAN:  No new Assessment & Plan notes have been filed under this hospital service since the last note was generated  Service: OB/GYN      Discussed with Dr Marlena Pollock MD  PGY-3, OB/GYN  2/2/2022 3:59 AM

## 2022-02-03 LAB
ATRIAL RATE: 95 BPM
BACTERIA UR CULT: ABNORMAL
BACTERIA UR CULT: ABNORMAL
C TRACH DNA SPEC QL NAA+PROBE: NEGATIVE
N GONORRHOEA DNA SPEC QL NAA+PROBE: NEGATIVE
P AXIS: 42 DEGREES
PR INTERVAL: 148 MS
QRS AXIS: 58 DEGREES
QRSD INTERVAL: 80 MS
QT INTERVAL: 356 MS
QTC INTERVAL: 447 MS
T WAVE AXIS: 22 DEGREES
VENTRICULAR RATE: 95 BPM

## 2022-02-03 PROCEDURE — 93010 ELECTROCARDIOGRAM REPORT: CPT | Performed by: INTERNAL MEDICINE

## 2022-03-07 ENCOUNTER — HOSPITAL ENCOUNTER (OUTPATIENT)
Facility: HOSPITAL | Age: 22
Discharge: HOME | End: 2022-03-07
Attending: OBSTETRICS & GYNECOLOGY | Admitting: OBSTETRICS & GYNECOLOGY
Payer: MEDICARE

## 2022-03-07 VITALS
HEART RATE: 101 BPM | RESPIRATION RATE: 16 BRPM | SYSTOLIC BLOOD PRESSURE: 107 MMHG | OXYGEN SATURATION: 100 % | TEMPERATURE: 98.2 F | DIASTOLIC BLOOD PRESSURE: 60 MMHG

## 2022-03-07 DIAGNOSIS — O99.891 BACK PAIN AFFECTING PREGNANCY IN THIRD TRIMESTER: Primary | ICD-10-CM

## 2022-03-07 DIAGNOSIS — M54.9 BACK PAIN AFFECTING PREGNANCY IN THIRD TRIMESTER: Primary | ICD-10-CM

## 2022-03-07 DIAGNOSIS — O21.9 NAUSEA AND VOMITING DURING PREGNANCY: ICD-10-CM

## 2022-03-07 PROBLEM — D69.6 THROMBOCYTOPENIA AFFECTING PREGNANCY (HCC): Status: ACTIVE | Noted: 2022-03-07

## 2022-03-07 PROBLEM — O99.119 THROMBOCYTOPENIA AFFECTING PREGNANCY (HCC): Status: ACTIVE | Noted: 2022-03-07

## 2022-03-07 PROBLEM — Z3A.28 28 WEEKS GESTATION OF PREGNANCY: Status: ACTIVE | Noted: 2022-02-02

## 2022-03-07 LAB
ANION GAP SERPL CALCULATED.3IONS-SCNC: 9 MMOL/L (ref 4–13)
BASOPHILS # BLD AUTO: 0.04 THOUSANDS/ΜL (ref 0–0.1)
BASOPHILS NFR BLD AUTO: 0 % (ref 0–1)
BILIRUB UR QL STRIP: NEGATIVE
BUN SERPL-MCNC: 9 MG/DL (ref 5–25)
CALCIUM SERPL-MCNC: 8.9 MG/DL (ref 8.3–10.1)
CHLORIDE SERPL-SCNC: 102 MMOL/L (ref 100–108)
CLARITY UR: CLEAR
CO2 SERPL-SCNC: 25 MMOL/L (ref 21–32)
COLOR UR: YELLOW
CREAT SERPL-MCNC: 0.63 MG/DL (ref 0.6–1.3)
EOSINOPHIL # BLD AUTO: 0.11 THOUSAND/ΜL (ref 0–0.61)
EOSINOPHIL NFR BLD AUTO: 1 % (ref 0–6)
ERYTHROCYTE [DISTWIDTH] IN BLOOD BY AUTOMATED COUNT: 12.6 % (ref 11.6–15.1)
GFR SERPL CREATININE-BSD FRML MDRD: 127 ML/MIN/1.73SQ M
GLUCOSE SERPL-MCNC: 100 MG/DL (ref 65–140)
GLUCOSE UR STRIP-MCNC: NEGATIVE MG/DL
HCT VFR BLD AUTO: 33.4 % (ref 34.8–46.1)
HGB BLD-MCNC: 11 G/DL (ref 11.5–15.4)
HGB UR QL STRIP.AUTO: NEGATIVE
IMM GRANULOCYTES # BLD AUTO: 0.07 THOUSAND/UL (ref 0–0.2)
IMM GRANULOCYTES NFR BLD AUTO: 1 % (ref 0–2)
KETONES UR STRIP-MCNC: NEGATIVE MG/DL
LEUKOCYTE ESTERASE UR QL STRIP: NEGATIVE
LYMPHOCYTES # BLD AUTO: 2.9 THOUSANDS/ΜL (ref 0.6–4.47)
LYMPHOCYTES NFR BLD AUTO: 27 % (ref 14–44)
MCH RBC QN AUTO: 27.7 PG (ref 26.8–34.3)
MCHC RBC AUTO-ENTMCNC: 32.9 G/DL (ref 31.4–37.4)
MCV RBC AUTO: 84 FL (ref 82–98)
MONOCYTES # BLD AUTO: 0.71 THOUSAND/ΜL (ref 0.17–1.22)
MONOCYTES NFR BLD AUTO: 7 % (ref 4–12)
NEUTROPHILS # BLD AUTO: 6.99 THOUSANDS/ΜL (ref 1.85–7.62)
NEUTS SEG NFR BLD AUTO: 64 % (ref 43–75)
NITRITE UR QL STRIP: NEGATIVE
NRBC BLD AUTO-RTO: 0 /100 WBCS
PH UR STRIP.AUTO: 7 [PH]
PLATELET # BLD AUTO: 145 THOUSANDS/UL (ref 149–390)
PMV BLD AUTO: 11.7 FL (ref 8.9–12.7)
POTASSIUM SERPL-SCNC: 3.7 MMOL/L (ref 3.5–5.3)
PROT UR STRIP-MCNC: NEGATIVE MG/DL
RBC # BLD AUTO: 3.97 MILLION/UL (ref 3.81–5.12)
SODIUM SERPL-SCNC: 136 MMOL/L (ref 136–145)
SP GR UR STRIP.AUTO: 1.02 (ref 1–1.03)
UROBILINOGEN UR QL STRIP.AUTO: 0.2 E.U./DL
WBC # BLD AUTO: 10.82 THOUSAND/UL (ref 4.31–10.16)

## 2022-03-07 PROCEDURE — 85025 COMPLETE CBC W/AUTO DIFF WBC: CPT

## 2022-03-07 PROCEDURE — 81003 URINALYSIS AUTO W/O SCOPE: CPT

## 2022-03-07 PROCEDURE — 76817 TRANSVAGINAL US OBSTETRIC: CPT | Performed by: OBSTETRICS & GYNECOLOGY

## 2022-03-07 PROCEDURE — 99213 OFFICE O/P EST LOW 20 MIN: CPT | Performed by: OBSTETRICS & GYNECOLOGY

## 2022-03-07 PROCEDURE — 80048 BASIC METABOLIC PNL TOTAL CA: CPT

## 2022-03-07 PROCEDURE — 99204 OFFICE O/P NEW MOD 45 MIN: CPT

## 2022-03-07 PROCEDURE — NC001 PR NO CHARGE: Performed by: OBSTETRICS & GYNECOLOGY

## 2022-03-07 RX ORDER — ONDANSETRON 4 MG/1
4 TABLET, FILM COATED ORAL EVERY 8 HOURS PRN
Qty: 20 TABLET | Refills: 0 | Status: SHIPPED | OUTPATIENT
Start: 2022-03-07

## 2022-03-07 RX ORDER — ONDANSETRON 2 MG/ML
4 INJECTION INTRAMUSCULAR; INTRAVENOUS EVERY 4 HOURS PRN
Status: DISCONTINUED | OUTPATIENT
Start: 2022-03-07 | End: 2022-03-08 | Stop reason: HOSPADM

## 2022-03-07 RX ORDER — LIDOCAINE 50 MG/G
1 PATCH TOPICAL DAILY
Status: DISCONTINUED | OUTPATIENT
Start: 2022-03-07 | End: 2022-03-08 | Stop reason: HOSPADM

## 2022-03-07 RX ORDER — SODIUM CHLORIDE 9 MG/ML
1000 INJECTION, SOLUTION INTRAVENOUS CONTINUOUS
Status: DISCONTINUED | OUTPATIENT
Start: 2022-03-07 | End: 2022-03-08 | Stop reason: HOSPADM

## 2022-03-07 RX ORDER — LIDOCAINE 50 MG/G
1 PATCH TOPICAL DAILY
Status: DISCONTINUED | OUTPATIENT
Start: 2022-03-08 | End: 2022-03-07

## 2022-03-07 RX ORDER — LIDOCAINE 50 MG/G
1 PATCH TOPICAL DAILY
Qty: 10 PATCH | Refills: 0 | Status: SHIPPED | OUTPATIENT
Start: 2022-03-07

## 2022-03-07 RX ADMIN — SODIUM CHLORIDE 1000 ML/HR: 9 INJECTION, SOLUTION INTRAVENOUS at 21:03

## 2022-03-07 RX ADMIN — LIDOCAINE 1 PATCH: 50 PATCH CUTANEOUS at 21:22

## 2022-03-07 RX ADMIN — ONDANSETRON 4 MG: 2 INJECTION INTRAMUSCULAR; INTRAVENOUS at 21:03

## 2022-03-07 NOTE — LETTER
[facility] AND DELIVERY  [address] [facility] [address]  Dept: [phone]    March 7, 2022     Patient: Rika Solorio   YOB: 2000   Date of Visit: 3/7/2022       To Whom it May Concern:    Rika Solorio is under my professional care  She was seen in the hospital on 3/7/2022, accompanied by her coworker Xiomara Cunha  Please take this into consideration regarding their absence  If you have any questions or concerns, please don't hesitate to call           Sincerely,          Deya Joel MD

## 2022-03-08 NOTE — PROGRESS NOTES
Obstetric Triage  Esperanza Knowles 25 y o  female MRN: [id number]  Unit/Bed#: L&D 329-01 Encounter: [id number]      Assessment/Plan:  25 y o  [id number] 28w0d initially presented for evaluation of low back pain/pelvic pressure, nausea and vomiting  No electrolyte abnormalities  Symptoms improved with Lidoderm patch, IV hydration and Zofran  Patient discharged home with Lidoderm patches and as needed Zofran  No concern for  labor; cervical length >3cm, see separate procedure note  CBC notable for gestational thrombocytopenia  Discharge instructions  Patient instructed to call if experiencing worsening contractions, vaginal bleeding, loss of fluid or decreased fetal movement  She will follow up with her [facility] on   Plan of care discussed with Dr John Alvares  ______________________________________  SUBJECTIVE    NADIRA: Estimated Date of Delivery: 22    HPI Chronology:  25 y o  [id number] 28w0d presents with multiple complaints including low back pain, pelvic pressure, nausea and vomiting  Patient states that she works 11 hour shifts for she is constantly standing on her feet  She is having low back pain and pelvic pressure and gets minimal relief from Tylenol  Her abdomen is painful when the baby moves  She does not know if this is normal as she did not experience this with her previous pregnancies  She does not believe she is having contractions  She has had no vaginal bleeding or leakage of fluid  She also feels nauseous and had bouts of emesis today after eating a potato from [facility]  Emesis was nonbloody and nonbilious  She denies sick contacts, fevers or chills, diarrhea  She has no urinary complaints including frequency, urgency or dysuria  She also sometimes feel a popping sensation on her abdomen and believes that the location of the pops are where fat was removed for her [place] butt left performed in July of last year in [place]       On chart review, it appears that this patient is socially complex  There are documentations of previous homelessness, alcohol abuse and sexually transmitted diseases including HIV and chlamydia  She receives her obstetric care at [facility] and it appears that she has not had a prenatal visit since December 20th  However, she has had multiple visits to [facility] since that time  Patient reports that she has a scheduled upcoming appointment on March 24th and checked this on her MyChart in my presence  She was accompanied tonight by a man she reports to be her co-worker and asked for a work note to be provided for both of them  He was asked to leave the room during my examination and during this time the patient reported that she has stable housing with her 2 children, that the current FOB is involved, and that she feel safe at home  Vitals:   /60 (BP Location: Right arm)   Pulse 101   Temp 98 2 °F (36 8 °C) (Oral)   Resp 16   LMP 08/21/2021   SpO2 100%   There is no height or weight on file to calculate BMI  Review of Systems  12-point ROS negative unless stated in the HPI  Physical Exam  GEN:  alert and oriented x 3, no apparent distress, appears well  PULMONARY:  Normal respiratory effort, no respiratory distress  ABDOMEN: gravid, soft, no tenderness  GENITOURINARY:  Normal external female genitalia  Cervix grossly normal without lesions  External cervical os appears closed       EXTREMITIES: nontender, trace edema  FETAL ASSESSMENT:  FHT: Baseline Rate: 130 bpm  Variability: Moderate 6-25 bpm  Accelerations: 15 x 15 or greater  Decelerations: None  TOCO: Contraction Frequency (minutes): none  Contraction Duration (seconds): n/a  Contraction Quality: Not applicable    Labs:   Recent Results (from the past 24 hour(s))   CBC and differential    Collection Time: 03/07/22  8:59 PM   Result Value Ref Range    WBC 10 82 (H) 4 31 - 10 16 Thousand/uL    RBC 3 97 3 81 - 5 12 Million/uL    Hemoglobin 11 0 (L) 11 5 - 15 4 g/dL    Hematocrit 33 4 (L) 34 8 - 46 1 %    MCV 84 82 - 98 fL    MCH 27 7 26 8 - 34 3 pg    MCHC 32 9 31 4 - 37 4 g/dL    RDW 12 6 11 6 - 15 1 %    MPV 11 7 8 9 - 12 7 fL    Platelets 224 (L) 664 - 390 Thousands/uL    nRBC 0 /100 WBCs    Neutrophils Relative 64 43 - 75 %    Immat GRANS % 1 0 - 2 %    Lymphocytes Relative 27 14 - 44 %    Monocytes Relative 7 4 - 12 %    Eosinophils Relative 1 0 - 6 %    Basophils Relative 0 0 - 1 %    Neutrophils Absolute 6 99 1 85 - 7 62 Thousands/µL    Immature Grans Absolute 0 07 0 00 - 0 20 Thousand/uL    Lymphocytes Absolute 2 90 0 60 - 4 47 Thousands/µL    Monocytes Absolute 0 71 0 17 - 1 22 Thousand/µL    Eosinophils Absolute 0 11 0 00 - 0 61 Thousand/µL    Basophils Absolute 0 04 0 00 - 0 10 Thousands/µL   UA w Reflex to Microscopic w Reflex to Culture    Collection Time: 03/07/22  8:59 PM    Specimen: Urine, Clean Catch   Result Value Ref Range    Color, UA Yellow     Clarity, UA Clear     Specific Gravity, UA 1 025 1 003 - 1 030    pH, UA 7 0 4 5, 5 0, 5 5, 6 0, 6 5, 7 0, 7 5, 8 0    Leukocytes, UA Negative Negative    Nitrite, UA Negative Negative    Protein, UA Negative Negative mg/dl    Glucose, UA Negative Negative mg/dl    Ketones, UA Negative Negative mg/dl    Urobilinogen, UA 0 2 0 2, 1 0 E U /dl E U /dl    Bilirubin, UA Negative Negative    Blood, UA Negative Negative   Basic metabolic panel    Collection Time: 03/07/22  8:59 PM   Result Value Ref Range    Sodium 136 136 - 145 mmol/L    Potassium 3 7 3 5 - 5 3 mmol/L    Chloride 102 100 - 108 mmol/L    CO2 25 21 - 32 mmol/L    ANION GAP 9 4 - 13 mmol/L    BUN 9 5 - 25 mg/dL    Creatinine 0 63 0 60 - 1 30 mg/dL    Glucose 100 65 - 140 mg/dL    Calcium 8 9 8 3 - 10 1 mg/dL    eGFR 127 ml/min/1 73sq m       Lab, Imaging and other studies: I have personally reviewed pertinent reports  Odalis Mott MD  PGY II, OB/GYN  3/7/2022, 11:25 PM

## 2022-03-08 NOTE — PROCEDURES
kristy Peter [id number] at 28w0d with an NADIAR of 5/30/2022, by Ultrasound, was seen at [address] for the following procedure(s): $Procedure Type: US - Transvaginal]    Ultrasound Other  Fetal Presentation: Vertex  Cervical Length: 3 29cm, 3 33cm, 3 61cm  Funnel: No  Debris: No    Ultrasound Probe Disinfection    A transvaginal ultrasound was performed  Prior to use, disinfection was performed with High Level Disinfection Process (Trophon)       Shahzad Mcarthur MD  03/07/22  10:08 PM

## 2022-03-08 NOTE — DISCHARGE INSTRUCTIONS
Conteo de patadas en el embarazo   LO QUE NECESITA SABER:   El conteo de patadas mide cuánto se está moviendo newell bebé en el útero  Aysha patada de newell bebé podría sentirse lan aysha torcedura, aysha vuelta, un crujido, un meneo o un golpe  Es común sentir a tu bebé patear a las 32 a 29 semanas de [place]  Es posible que sienta al bebé patear ya a las 20 semanas de [place]  Puede que desee empezar a contar a las 28 semanas  INSTRUCCIONES SOBRE EL JAREK HOSPITALARIA:   Comuníquese con newell médico de inmediato si:  · Usted siente un cambio en el número de patadas o movimientos de newell bebé  · Siente menos de 10 patadas en 2 horas  · Usted tiene preguntas o inquietudes acerca de los movimientos de newell bebé  Por qué realizar el conteo de patadas: Los movimientos de newell bebé podrían proporcionar información de la estrella de newell bebé  Es posible que si hay problemas, newell bebé se mueva menos o nada en lo absoluto  El bebé podría moverse menos si no recibe suficiente oxígeno o alimento de la placenta  No fume mientras está embarazada  Fumar disminuye la cantidad de oxígeno que llega a newell bebé  Hable con newell médico si necesita ayuda para dejar de fumar  Los problemas que se encuentran en aysha etapa más temprana son más fáciles de tratar  Cuándo realizar el conteo de patadas:  · Cuente las patadas en el mismo horario todos los GRASSE  · Realice el conteo de las patadas cuando newell bebé esté despierto y [place]  Newell bebé podría estar más activo en la tarde  Cómo realizar el conteo de patadas: Revise que newell bebé esté despierto antes de realizar el conteo de patadas  Usted puede despertar a newell bebé empujando newell estómago suavemente, caminando o tomando algo frío  Newell médico podría indicarle diferentes maneras de realizar el conteo  Es posible que le indique que nilay lo siguiente:  · Use aysha gráfica o un reloj para mantener un registro de la hora en que comienza y termina de contar      · Siéntese en aysha silla o acuéstese en newell costado derecho  · Coloque deborah sheridan en la parte más alex de newell BJURHOLM  · Cuente hasta que llegue a las 10 patadas  Escriba cuánto tiempo le lleva contar las 10 patadas  · Podría rachid de 30 minutos a 2 horas para contar 10 patadas  No debería de rachid más de 2 horas para contar 10 patadas  Acuda a la consulta de control con newell médico según las indicaciones: Anote deborah preguntas para que se acuerde de hacerlas kain deborah visitas  © Copyright [employer] 2022 Information is for End User's use only and may not be sold, redistributed or otherwise used for commercial purposes  All illustrations and images included in CareNotes® are the copyrighted property of [employer]  or [address] [place] [address] es sólo para uso en educación  Newell intención no es darle un consejo médico sobre enfermedades o tratamientos  Colsulte con newell Gonzalez Borges farmacéutico antes de seguir cualquier régimen médico para saber si es seguro y efectivo para usted

## 2022-08-18 ENCOUNTER — PATIENT OUTREACH (OUTPATIENT)
Dept: SURGERY | Facility: CLINIC | Age: 22
End: 2022-08-18

## 2022-08-24 ENCOUNTER — PATIENT OUTREACH (OUTPATIENT)
Dept: SURGERY | Facility: CLINIC | Age: 22
End: 2022-08-24
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
## 2022-08-31 ENCOUNTER — PATIENT OUTREACH (OUTPATIENT)
Dept: SURGERY | Facility: CLINIC | Age: 22
End: 2022-08-31

## 2022-09-01 NOTE — PROGRESS NOTES
Cm contacted Ct for intake and housing assessment, Ct confirmed date for 08/24/2022  CM informed Ct of all required documentation

## 2022-09-02 NOTE — PROGRESS NOTES
Cm contacted by Ct for updates on her assistance by [facility] CM, CM informed CM at [facility]  CM contacted Ct, informed Ct waiting to hear updates from [facility] [place] [facility] and [address] for extension on hotel assistance

## 2022-09-06 ENCOUNTER — PATIENT OUTREACH (OUTPATIENT)
Dept: SURGERY | Facility: CLINIC | Age: 22
End: 2022-09-06

## 2022-09-06 NOTE — PROGRESS NOTES
CM contacted by Betty Concepcion states she was able to find a place and a landlord that is willing to rent  CM called maryann, maryann requested text message and email  Maryann also requested completed application, application costs $56  CM informed Ct due to housing assistance funding requirements, hospital unable to pay application fee  Ct states she will be able to pay for the application  CM sent all required housing documentation including [address] and residence to rent form to maryann to complete  CM to follow up with Ct once maryann returns completed documents  Maryann states it is ready to be moved into tomorrow, CM further explained housing assistance process and timeline  Cm to follow up with updates from maryann  CM contacted CM at [facility] for updates

## 2022-09-12 ENCOUNTER — PATIENT OUTREACH (OUTPATIENT)
Dept: SURGERY | Facility: CLINIC | Age: 22
End: 2022-09-12

## 2022-09-12 NOTE — PROGRESS NOTES
CM spoke with CM at [facility], discussed case and provided an update on ct  CM let CM at [facility] know Ct provided contact information for a landlord and for an application, CM has not heard from Ct since 09/06/2022  CM sent all required documents to [facility], CM has not recieved completed document from [facility]  CM to follow up with CM at [facility], [address], and meeting with Silas Moody on 09/17/2022 for updates

## 2022-09-13 ENCOUNTER — PATIENT OUTREACH (OUTPATIENT)
Dept: SURGERY | Facility: CLINIC | Age: 22
End: 2022-09-13

## 2022-09-13 NOTE — PROGRESS NOTES
CM contacted Ct for an update, no response, left voicemail  CM asked Ct in voicemail status updates of landlord, apartment, and if Ct was able to utilize  to find additional assistance as required by funding agency  CM to follow up with Ct 09/14/2022

## 2022-09-14 ENCOUNTER — PATIENT OUTREACH (OUTPATIENT)
Dept: SURGERY | Facility: CLINIC | Age: 22
End: 2022-09-14

## 2022-09-14 NOTE — PROGRESS NOTES
CM contacted Ct, Ct did not , CM left voicemail  CM contacted by [facility]  Supervisor for updates, CM informed [facility]  Supervisor that ct has not been heard from since 09/06/2022  CM to contact Ct again 09/15

## 2022-09-15 ENCOUNTER — PATIENT OUTREACH (OUTPATIENT)
Dept: SURGERY | Facility: CLINIC | Age: 22
End: 2022-09-15

## 2022-09-15 NOTE — PROGRESS NOTES
HC contacted Ct Ct answered phone , Augustina asked about previous landlord and for a follow up, Ct said that they discovered [facility], landlortrevor wanted cash only and Ct called from a different number to [facility] and that is when she discovered it was a false advertisement  Ct states she is at a doctor's appointment, will send Augustina villanueva contact she found today  HC discussed case with Augustina Supervisor, to follow up with additional updates

## 2022-09-16 ENCOUNTER — PATIENT OUTREACH (OUTPATIENT)
Dept: SURGERY | Facility: CLINIC | Age: 22
End: 2022-09-16

## 2022-09-16 NOTE — PROGRESS NOTES
HC contacted Ct for updates, ct did not send follow up contact information  HC contacted ct to confirm shelters that she applied to via [place] [address] per Ct conversation 08/31/2022 via text and left voicemail  HC discussed case with Silas Moody, states if Ct did not apply to [facility] or Timmy will not authorize hotel assistance  [facility], [facility]  Supervisor recommends HC contact CM at [facility], require two more weeks until we can get an update from Ct  HC contacted CM at [facility], to request an extension for hotel assistance before [address] can take over  CM at [facility] approved additional two week authorization for assistance  HC to follow up with Ct
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
## 2022-09-19 ENCOUNTER — PATIENT OUTREACH (OUTPATIENT)
Dept: SURGERY | Facility: CLINIC | Age: 22
End: 2022-09-19

## 2022-09-19 NOTE — PROGRESS NOTES
CM contacted by Ct and Maryann, Ct states maryann was trying to get in communication with CM  CM contacted landlord, confirmed office availability to drop off completed documents  CM contacted Ct, no response, left voicemail  CM texted Ct for missing documents needed to complete housing authorization  CM missing [employer] pay stubs, cell phone bill, proof of assistance from [facility], and any expenses for her and her family  CM to contact Ct again to schedule an appointment to complete and sign [employer], Indemnification, and to turn in documents

## 2022-09-20 ENCOUNTER — PATIENT OUTREACH (OUTPATIENT)
Dept: SURGERY | Facility: CLINIC | Age: 22
End: 2022-09-20

## 2022-09-20 NOTE — PROGRESS NOTES
HC and HS completed inspection for ct  HC found no issues with unit  HC discussed case with HS and Gómez Snyder  HC completed housing authorization, received all supporting documentation except for last week of August [employer] income  Once income received, HC to complete TBRA calculation  HC worked on rent reasonableness for application in the meantime  HC to follow up with Ct for reminder to send documentation and when Ct can move into unit

## 2022-09-20 NOTE — PROGRESS NOTES
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

## 2022-09-21 ENCOUNTER — PATIENT OUTREACH (OUTPATIENT)
Dept: SURGERY | Facility: CLINIC | Age: 22
End: 2022-09-21

## 2022-09-22 ENCOUNTER — PATIENT OUTREACH (OUTPATIENT)
Dept: SURGERY | Facility: CLINIC | Age: 22
End: 2022-09-22

## 2022-09-22 NOTE — PROGRESS NOTES
HC met with Ct to print out [employer] benefits and [employer] bank statements  Ct states she had issues getting into her [employer] account to retrieve her pay stubs  Ct states she contacted [employer] and Support let her know that it will take two to three days to reset account  HC confirmed that maryann bought property, is not for sale  Maryann states he bought property on the 26th  HC printed out [employer] forms, HC to receive screenshots with all income  HC verified all PM questions with client, ct states that she does not own the car and insurance is not in her name  Ct states that she assists her friend, Reece Nuno, in paying the car insurance  HC to receive letter from friend to verify that she helps ct with rides  [employer] questioned TANF support, Ct  States that [facility] assistance worker informed her that she must be on child support to receive it and ct states she has no relationship with the father of her children  HC to receive letter from Ct, [email or web address]  to update Ct with anticipated move in and approval  [facility]  Ct states she has a new job, [employer]  informed her to provide her first pay stub to recalculate TBRA assistance
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
## 2022-09-23 ENCOUNTER — PATIENT OUTREACH (OUTPATIENT)
Dept: SURGERY | Facility: CLINIC | Age: 22
End: 2022-09-23

## 2022-09-23 NOTE — PROGRESS NOTES
HC discussed case with HS and PM at [facility]  PM provided additional information for TANF eligibility, PM states If a dependent does get child support, it is to her understanding that the dependent would not be eligible for TANF ;1  parent must apply for child support in order to be eligible for dependent TANF should the father not be able to pay child support/is missing 2  if the parent is receiving dependent child support, then the household would not be eligible for TANF for the dependent 3  there COULD be child support that is why there is no TANF  PM recommends HC inquire about additional help in household or outside, states it would not hurt to a parent- is buying items for the children each month  if so, PM recommends to put a dollar value to it and consider it income that is helping to pay for recurring living expenses  HS provided approval to move forward with Ct application, Ct approved to move in, Ct to provide copy of signed lease for payment to [address] [place] [address]  HC received copy of lease with Ct children's name to verify that only members living in the household

## 2022-09-26 ENCOUNTER — PATIENT OUTREACH (OUTPATIENT)
Dept: SURGERY | Facility: CLINIC | Age: 22
End: 2022-09-26

## 2022-09-29 ENCOUNTER — PATIENT OUTREACH (OUTPATIENT)
Dept: SURGERY | Facility: CLINIC | Age: 22
End: 2022-09-29

## 2022-09-29 NOTE — PROGRESS NOTES
HC got TBRA check requisition signed  HC sent to [facility] for processing and accounts payable for October Rent  HC received check req back, mistaked for October, not November  HC to prepare new check req for November rent in [address] [place] [address] Rd, as lease started 09/26

## 2022-10-03 ENCOUNTER — PATIENT OUTREACH (OUTPATIENT)
Dept: SURGERY | Facility: CLINIC | Age: 22
End: 2022-10-03

## 2022-10-03 NOTE — PROGRESS NOTES
HC contacted Ct via text to call CM at [facility] to provide updates for checking out of the hotel  CM at [facility] let [facility]  know that Ct has not been answering calls, CM has attempted to leave voicemail but, Ct phone inbox is full  Ct states she will contact CM at [facility]  HC to contact CM for updates on Ct contact

## 2022-10-04 ENCOUNTER — PATIENT OUTREACH (OUTPATIENT)
Dept: SURGERY | Facility: CLINIC | Age: 22
End: 2022-10-04

## 2022-10-04 NOTE — PROGRESS NOTES
HC received updates from CM at [facility], CM confirmed that Ct checked out of the hotel on 09/29 but, did not contact CM  CM at [facility] confirmed Ct address, HC confirmed  HC informed CM that if there any updates, HC will contact CM  CM agreed and states they understood  HC to contact CM with updates and to contact Ct for communication attempts by Ct

## 2022-10-12 PROBLEM — J10.1 INFLUENZA DUE TO INFLUENZA A VIRUS: Status: RESOLVED | Noted: 2022-02-02 | Resolved: 2022-10-12

## 2022-10-18 ENCOUNTER — PATIENT OUTREACH (OUTPATIENT)
Dept: SURGERY | Facility: CLINIC | Age: 22
End: 2022-10-18

## 2022-10-19 ENCOUNTER — PATIENT OUTREACH (OUTPATIENT)
Dept: SURGERY | Facility: CLINIC | Age: 22
End: 2022-10-19
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
## 2022-10-20 ENCOUNTER — PATIENT OUTREACH (OUTPATIENT)
Dept: SURGERY | Facility: CLINIC | Age: 22
End: 2022-10-20

## 2022-10-24 NOTE — PROGRESS NOTES
HC prepared and organized Ct paper file, included most updated supporting documentation provided at the time of authorization

## 2022-10-25 ENCOUNTER — PATIENT OUTREACH (OUTPATIENT)
Dept: SURGERY | Facility: CLINIC | Age: 22
End: 2022-10-25

## 2022-11-14 ENCOUNTER — PATIENT OUTREACH (OUTPATIENT)
Dept: SURGERY | Facility: CLINIC | Age: 22
End: 2022-11-14

## 2022-11-14 NOTE — PROGRESS NOTES
HC got TBRA check requisition signed  HC sent to [facility] for processing and accounts payable

## 2022-11-15 ENCOUNTER — PATIENT OUTREACH (OUTPATIENT)
Dept: SURGERY | Facility: CLINIC | Age: 22
End: 2022-11-15

## 2022-11-15 NOTE — PROGRESS NOTES
HC called Ct because CM at [facility] contacted HC due to missed communication  HC called Ct, Ct rich  Ct rich states he was able to communicate with Ct 10 days ago  Ct called HC from a new number, HC let Ct know to call medical team at [facility], for situation regarding her children

## 2022-11-16 ENCOUNTER — PATIENT OUTREACH (OUTPATIENT)
Dept: SURGERY | Facility: CLINIC | Age: 22
End: 2022-11-16

## 2022-11-17 NOTE — PROGRESS NOTES
CM at [facility] contacted [facility]  for follow up, CM let [facility]  know that Ct went to [facility] for Ct son, Rupali Leahy and wanted to know where to get her results  HC let CM know the name of the practice, CM confirmed that ct attended her appt and was able to [facility]  CM confirmed new CM for [facility]  and updated contact information

## 2022-12-14 ENCOUNTER — PATIENT OUTREACH (OUTPATIENT)
Dept: SURGERY | Facility: CLINIC | Age: 22
End: 2022-12-14

## 2022-12-14 NOTE — PROGRESS NOTES
HC got TBRA check requisition signed  HC sent to [facility] for processing and accounts payable

## 2023-01-06 ENCOUNTER — OFFICE VISIT (OUTPATIENT)
Dept: DENTISTRY | Facility: CLINIC | Age: 23
End: 2023-01-06

## 2023-01-06 ENCOUNTER — PATIENT OUTREACH (OUTPATIENT)
Dept: SURGERY | Facility: CLINIC | Age: 23
End: 2023-01-06

## 2023-01-06 VITALS — DIASTOLIC BLOOD PRESSURE: 69 MMHG | HEART RATE: 89 BPM | TEMPERATURE: 97.9 F | SYSTOLIC BLOOD PRESSURE: 108 MMHG

## 2023-01-06 DIAGNOSIS — Z01.20 ENCOUNTER FOR DENTAL EXAMINATION: Primary | ICD-10-CM

## 2023-01-06 RX ORDER — BICTEGRAVIR SODIUM, EMTRICITABINE, AND TENOFOVIR ALAFENAMIDE FUMARATE 50; 200; 25 MG/1; MG/1; MG/1
1 TABLET ORAL DAILY
COMMUNITY
Start: 2022-07-21

## 2023-01-06 NOTE — PROGRESS NOTES
HC contacted by both Ct and rich, rich confirmed that he has received the checks  Ct states that landlord did not receive check, HC f/u with Ct, Ct worried that [facility]  has not communicated with her  HC let Ct know it is okay, since Ct does not violate lease terms and pays on time  HC contacted narad again, rich confirmed check received on the 28th   HC contacted Ct again, Ct wanted to see [facility] , HC scheduled ct for Jan, 9th at 1pm

## 2023-01-06 NOTE — DENTAL PROCEDURE DETAILS
Dariela Ansari presents for a Comprehensive exam  Verbal consent for treatment given in addition to the forms  Reviewed health history - Patient is ASA II  Consents signed: Yes     Perio: Normal  Pain Scale: 0  Caries Assessment: High  Radiographs: Complete mouth series    PARLs noted at apicies of 2, and RCT treated 29 and 31  EOE WNL  IOE shows no soft tissue concerns, good oral hygiene  Oral Hygiene instruction reviewed and given  Recommended Hygiene recall visits with the Anatoliy      Treatment Plan:  1  Infection control: may need referral for RCT retreats  2  Periodontal therapy:   3  Caries control: as charted  4  Occlusal evaluation: Class I  5  Case Difficulty Type 2  Prognosis is Good    Referrals needed: No  Next Visit:  complete simple restorative procedure, continue discussing Tx options for teeth that need RCT or RCT-retreat

## 2023-01-11 ENCOUNTER — PATIENT OUTREACH (OUTPATIENT)
Dept: SURGERY | Facility: CLINIC | Age: 23
End: 2023-01-11

## 2023-01-13 NOTE — PROGRESS NOTES
HC got TBRA check requisition signed and uploaded to media  HC sent to [facility] for processing and accounts payable

## 2023-02-15 ENCOUNTER — PATIENT OUTREACH (OUTPATIENT)
Dept: SURGERY | Facility: CLINIC | Age: 23
End: 2023-02-15

## 2023-02-15 NOTE — PROGRESS NOTES
HC got TBRA check requisition signed and uploaded to media  HC sent to [facility] for processing and accounts payable

## 2023-02-19 ENCOUNTER — HOSPITAL ENCOUNTER (EMERGENCY)
Facility: HOSPITAL | Age: 23
Discharge: HOME | End: 2023-02-19
Attending: EMERGENCY MEDICINE

## 2023-02-19 VITALS
RESPIRATION RATE: 20 BRPM | SYSTOLIC BLOOD PRESSURE: 106 MMHG | WEIGHT: 192.02 LBS | HEART RATE: 131 BPM | OXYGEN SATURATION: 98 % | DIASTOLIC BLOOD PRESSURE: 67 MMHG | TEMPERATURE: 99 F

## 2023-02-19 DIAGNOSIS — B34.9 VIRAL SYNDROME: Primary | ICD-10-CM

## 2023-02-19 LAB
EXT PREGNANCY TEST URINE: NEGATIVE
EXT. CONTROL: NORMAL
FLUAV RNA RESP QL NAA+PROBE: NEGATIVE
FLUBV RNA RESP QL NAA+PROBE: NEGATIVE
RSV RNA RESP QL NAA+PROBE: NEGATIVE
SARS-COV-2 RNA RESP QL NAA+PROBE: NEGATIVE

## 2023-02-19 RX ORDER — KETOROLAC TROMETHAMINE 30 MG/ML
15 INJECTION, SOLUTION INTRAMUSCULAR; INTRAVENOUS ONCE
Status: COMPLETED | OUTPATIENT
Start: 2023-02-19 | End: 2023-02-19

## 2023-02-19 RX ORDER — ACETAMINOPHEN 160 MG/5ML
SUSPENSION, ORAL (FINAL DOSE FORM) ORAL
Status: COMPLETED
Start: 2023-02-19 | End: 2023-02-19

## 2023-02-19 RX ORDER — ONDANSETRON 2 MG/ML
4 INJECTION INTRAMUSCULAR; INTRAVENOUS ONCE
Status: COMPLETED | OUTPATIENT
Start: 2023-02-19 | End: 2023-02-19

## 2023-02-19 RX ADMIN — ONDANSETRON 4 MG: 2 INJECTION INTRAMUSCULAR; INTRAVENOUS at 17:09

## 2023-02-19 RX ADMIN — KETOROLAC TROMETHAMINE 15 MG: 30 INJECTION, SOLUTION INTRAMUSCULAR; INTRAVENOUS at 17:09

## 2023-02-19 RX ADMIN — SODIUM CHLORIDE 1000 ML: 0.9 INJECTION, SOLUTION INTRAVENOUS at 16:48

## 2023-02-19 NOTE — Clinical Note
Staci Knight was seen and treated in our emergency department on 2/19/2023  Diagnosis:     Anatoliy  may return to work on return date  She may return on this date: 02/21/2023         If you have any questions or concerns, please don't hesitate to call        Marlena Young MD    ______________________________           _______________          _______________  Hospital Representative                              Date                                Time

## 2023-02-19 NOTE — ED PROVIDER NOTES
History  Chief Complaint   Patient presents with   • Flu Symptoms     Pt arrives via ambulance litter c/o fever, body aches, chills   EMS crew gave Tylenol enroute      Patient is a 45-year-old female brought in by [facility] with a 2 day h/o viral symptoms  +subjective fever at home, LD motrin 3 hours ago, 400 mg  Febrile in EMS truck, given 650 of tylenol   +myalgias and nausea  Denies any sick contacts  Had 3 COVID shots, last over a year ago  Did get flu shot in fall  Prior to Admission Medications   Prescriptions Last Dose Informant Patient Reported? Taking?   acetaminophen (TYLENOL) 325 mg tablet   No No   Sig: Take 2 tablets (650 mg total) by mouth every 6 (six) hours as needed for mild pain   bictegravir-emtricitab-tenofovir alafenamide (Biktarvy) -25 MG tablet   Yes No   Sig: Take 1 tablet by mouth daily   hyoscyamine (ANASPAZ,LEVSIN) 0 125 MG tablet   No No   Sig: Take 1 tablet (0 125 mg total) by mouth every 4 (four) hours as needed for cramping   Patient not taking: Reported on 2021      Facility-Administered Medications: None       Past Medical History:   Diagnosis Date   • HIV (human immunodeficiency virus infection) ([facility] [address] )        Past Surgical History:   Procedure Laterality Date   •  SECTION     • CHOLECYSTECTOMY         History reviewed  No pertinent family history  I have reviewed and agree with the history as documented  E-Cigarette/Vaping   • E-Cigarette Use Never User      E-Cigarette/Vaping Substances   • Nicotine No    • THC No    • CBD No    • Flavoring No    • Other No    • Unknown No      Social History     Tobacco Use   • Smoking status: Never   • Smokeless tobacco: Never   Vaping Use   • Vaping Use: Never used   Substance Use Topics   • Alcohol use: Not Currently   • Drug use: Not Currently     Types: Marijuana       Review of Systems   Constitutional: Positive for activity change and fatigue  HENT: Negative  Eyes: Negative  Respiratory: Negative  Cardiovascular: Negative  Gastrointestinal: Positive for nausea  Endocrine: Negative  Genitourinary: Negative  Musculoskeletal: Positive for myalgias  Skin: Negative  Allergic/Immunologic: Negative  Neurological: Positive for dizziness  Hematological: Negative  Psychiatric/Behavioral: Negative  All other systems reviewed and are negative  Physical Exam  Physical Exam  Vitals and nursing note reviewed  Constitutional:       Appearance: Normal appearance  She is obese  HENT:      Head: Normocephalic and atraumatic  Right Ear: Tympanic membrane, ear canal and external ear normal       Left Ear: Tympanic membrane, ear canal and external ear normal       Nose: Nose normal       Mouth/Throat:      Mouth: Mucous membranes are moist       Pharynx: Oropharynx is clear  Eyes:      Conjunctiva/sclera: Conjunctivae normal       Pupils: Pupils are equal, round, and reactive to light  Cardiovascular:      Rate and Rhythm: Tachycardia present  Pulses: Normal pulses  Heart sounds: Normal heart sounds  Pulmonary:      Effort: Pulmonary effort is normal    Abdominal:      General: Bowel sounds are normal    Musculoskeletal:         General: Normal range of motion  Cervical back: Normal range of motion and neck supple  Skin:     General: Skin is warm  Capillary Refill: Capillary refill takes less than 2 seconds  Neurological:      General: No focal deficit present  Mental Status: She is alert and oriented to person, place, and time     Psychiatric:         Mood and Affect: Mood normal          Behavior: Behavior normal          Vital Signs  ED Triage Vitals   Temperature Pulse Respirations Blood Pressure SpO2   02/19/23 1633 02/19/23 1633 02/19/23 1633 02/19/23 1633 02/19/23 1633   (!) 102 5 °F (39 2 °C) (!) 131 20 106/67 98 %      Temp Source Heart Rate Source Patient Position - Orthostatic VS BP Location FiO2 (%)   02/19/23 1633 02/19/23 1633 02/19/23 1633 02/19/23 1633 --   Oral Monitor Sitting Left arm       Pain Score       02/19/23 1709       10 - Worst Possible Pain           Vitals:    02/19/23 1633   BP: 106/67   Pulse: (!) 131   Patient Position - Orthostatic VS: Sitting         Visual Acuity      ED Medications  Medications   sodium chloride 0 9 % bolus 1,000 mL (1,000 mL Intravenous New Bag 2/19/23 1648)   ketorolac (TORADOL) injection 15 mg (15 mg Intravenous Given 2/19/23 1709)   acetaminophen (TYLENOL) 160 mg/5 mL oral suspension **ADS Override Pull** (  Given to EMS 2/19/23 1635)   ondansetron (ZOFRAN) injection 4 mg (4 mg Intravenous Given 2/19/23 1709)       Diagnostic Studies  Results Reviewed     Procedure Component Value Units Date/Time    FLU/RSV/COVID - if FLU/RSV clinically relevant [[id number]]  (Normal) Collected: 02/19/23 1641    Lab Status: Final result Specimen: Nares from Nose Updated: 02/19/23 1726     SARS-CoV-2 Negative     INFLUENZA A PCR Negative     INFLUENZA B PCR Negative     RSV PCR Negative    Narrative:      FOR PEDIATRIC PATIENTS - copy/paste COVID Guidelines URL to browser: [email or web address] [email or web address]  Culture [employer]    SARS-CoV-2 assay is a Nucleic Acid Amplification assay intended for the  qualitative detection of nucleic acid from SARS-CoV-2 in nasopharyngeal  swabs  Results are for the presumptive identification of SARS-CoV-2 RNA  Positive results are indicative of infection with SARS-CoV-2, the virus  causing COVID-19, but do not rule out bacterial infection or co-infection  with other viruses  Laboratories within the [place] and its  territories are required to report all positive results to the appropriate  public health authorities  Negative results do not preclude SARS-CoV-2  infection and should not be used as the sole basis for treatment or other  patient management decisions   Negative results must be combined with  clinical observations, patient history, and epidemiological information  This test has not been FDA cleared or approved  This test has been authorized by FDA under an Emergency Use Authorization  (EUA)  This test is only authorized for the duration of time the  declaration that circumstances exist justifying the authorization of the  emergency use of an in vitro diagnostic tests for detection of SARS-CoV-2  virus and/or diagnosis of COVID-19 infection under section 564(b)(1) of  the Act, 21 U  S C  110JCG-9(N)(6), unless the authorization is terminated  or revoked sooner  The test has been validated but independent review by FDA  and CLIA is pending  Test performed using [employer] GeneXpert: This RT-PCR assay targets N2,  a region unique to SARS-CoV-2  A conserved region in the E-gene was chosen  for pan-Sarbecovirus detection which includes SARS-CoV-2  According to CMS-2020-01-R, this platform meets the definition of high-throughput technology  POCT pregnancy, urine [[id number]]  (Normal) Resulted: 02/19/23 1658    Lab Status: Final result Updated: 02/19/23 1658     EXT Preg Test, Ur Negative     Control Valid                 No orders to display              Procedures  Procedures         ED Course  ED Course as of 02/19/23 1734   Sun Feb 19, 2023   1733 SARS-COV-2: Negative   1733 INFLU A PCR: Negative   1733 INFLU B PCR: Negative   1733 Feeling improved  Will dc  Stressed viral syndrome, rest, fluids, tylenol                                             Medical Decision Making  Viral syndrome: acute illness or injury     Details: viral syndrome  symptoms improved with fluids, toradol and pre hopsitla tylenol  covid flu a/b neg  Amount and/or Complexity of Data Reviewed  Independent Historian: EMS  Labs: ordered  Decision-making details documented in ED Course  Risk  Prescription drug management            Disposition  Final diagnoses:   Viral syndrome     Time reflects when diagnosis was documented in both MDM as applicable and the Disposition within this note     Time User Action Codes Description Comment    2/19/2023  5:34 PM Mliss Stamp Add [B34 9] Viral syndrome       ED Disposition     ED Disposition   Discharge    Condition   Stable    Date/Time   Sun Feb 19, 2023  5:34 PM    [address] discharge to home/self care  Follow-up Information     Follow up With Specialties Details Why Contact Info Additional [address]   [address], [address] [phone]  [address], [address], [address], [place], [address]          Patient's Medications   Discharge Prescriptions    No medications on file       No discharge procedures on file      PDMP Review     None          ED Provider  Electronically Signed by           Tahir Cruz MD  02/19/23 0440

## 2023-02-19 NOTE — Clinical Note
Burman Dakins was seen and treated in our emergency department on 2/19/2023  Diagnosis:     Anatoliy  may return to work on return date  She may return on this date: 02/21/2023         If you have any questions or concerns, please don't hesitate to call        Nereyda Bustillo MD    ______________________________           _______________          _______________  Hospital Representative                              Date                                Time

## 2023-03-16 ENCOUNTER — APPOINTMENT (EMERGENCY)
Dept: RADIOLOGY | Facility: HOSPITAL | Age: 23
End: 2023-03-16

## 2023-03-16 ENCOUNTER — HOSPITAL ENCOUNTER (EMERGENCY)
Facility: HOSPITAL | Age: 23
Discharge: HOME | End: 2023-03-16
Attending: EMERGENCY MEDICINE

## 2023-03-16 ENCOUNTER — APPOINTMENT (EMERGENCY)
Dept: CT IMAGING | Facility: HOSPITAL | Age: 23
End: 2023-03-16

## 2023-03-16 VITALS
WEIGHT: 184.3 LBS | RESPIRATION RATE: 17 BRPM | HEART RATE: 69 BPM | TEMPERATURE: 97.8 F | SYSTOLIC BLOOD PRESSURE: 94 MMHG | OXYGEN SATURATION: 100 % | DIASTOLIC BLOOD PRESSURE: 56 MMHG

## 2023-03-16 DIAGNOSIS — H65.00 ACUTE SEROUS OTITIS MEDIA: ICD-10-CM

## 2023-03-16 DIAGNOSIS — H81.10 BENIGN POSITIONAL VERTIGO: Primary | ICD-10-CM

## 2023-03-16 LAB
ANION GAP SERPL CALCULATED.3IONS-SCNC: 6 MMOL/L (ref 4–13)
ATRIAL RATE: 79 BPM
BACTERIA UR QL AUTO: ABNORMAL /HPF
BASOPHILS # BLD MANUAL: 0 THOUSAND/UL (ref 0–0.1)
BASOPHILS NFR MAR MANUAL: 0 % (ref 0–1)
BILIRUB UR QL STRIP: NEGATIVE
BUN SERPL-MCNC: 8 MG/DL (ref 5–25)
CALCIUM SERPL-MCNC: 9.8 MG/DL (ref 8.4–10.2)
CHLORIDE SERPL-SCNC: 102 MMOL/L (ref 96–108)
CLARITY UR: CLEAR
CO2 SERPL-SCNC: 28 MMOL/L (ref 21–32)
COLOR UR: YELLOW
CREAT SERPL-MCNC: 0.62 MG/DL (ref 0.6–1.3)
EOSINOPHIL # BLD MANUAL: 0.36 THOUSAND/UL (ref 0–0.4)
EOSINOPHIL NFR BLD MANUAL: 4 % (ref 0–6)
ERYTHROCYTE [DISTWIDTH] IN BLOOD BY AUTOMATED COUNT: 13.5 % (ref 11.6–15.1)
EXT PREGNANCY TEST URINE: NEGATIVE
EXT. CONTROL: NORMAL
GFR SERPL CREATININE-BSD FRML MDRD: 127 ML/MIN/1.73SQ M
GLUCOSE SERPL-MCNC: 84 MG/DL (ref 65–140)
GLUCOSE UR STRIP-MCNC: NEGATIVE MG/DL
HCT VFR BLD AUTO: 40.3 % (ref 34.8–46.1)
HGB BLD-MCNC: 12.9 G/DL (ref 11.5–15.4)
HGB UR QL STRIP.AUTO: ABNORMAL
KETONES UR STRIP-MCNC: NEGATIVE MG/DL
LEUKOCYTE ESTERASE UR QL STRIP: NEGATIVE
LYMPHOCYTES # BLD AUTO: 4.58 THOUSAND/UL (ref 0.6–4.47)
LYMPHOCYTES # BLD AUTO: 51 % (ref 14–44)
MCH RBC QN AUTO: 26.1 PG (ref 26.8–34.3)
MCHC RBC AUTO-ENTMCNC: 32 G/DL (ref 31.4–37.4)
MCV RBC AUTO: 81 FL (ref 82–98)
MONOCYTES # BLD AUTO: 0.63 THOUSAND/UL (ref 0–1.22)
MONOCYTES NFR BLD: 7 % (ref 4–12)
NEUTROPHILS # BLD MANUAL: 3.42 THOUSAND/UL (ref 1.85–7.62)
NEUTS BAND NFR BLD MANUAL: 1 % (ref 0–8)
NEUTS SEG NFR BLD AUTO: 37 % (ref 43–75)
NITRITE UR QL STRIP: NEGATIVE
NON-SQ EPI CELLS URNS QL MICRO: ABNORMAL /HPF
P AXIS: 58 DEGREES
PH UR STRIP.AUTO: 8 [PH] (ref 4.5–8)
PLATELET # BLD AUTO: 255 THOUSANDS/UL (ref 149–390)
PLATELET BLD QL SMEAR: ADEQUATE
PMV BLD AUTO: 11 FL (ref 8.9–12.7)
POTASSIUM SERPL-SCNC: 4 MMOL/L (ref 3.5–5.3)
PR INTERVAL: 156 MS
PROT UR STRIP-MCNC: NEGATIVE MG/DL
QRS AXIS: 79 DEGREES
QRSD INTERVAL: 82 MS
QT INTERVAL: 374 MS
QTC INTERVAL: 428 MS
RBC # BLD AUTO: 4.95 MILLION/UL (ref 3.81–5.12)
RBC #/AREA URNS AUTO: ABNORMAL /HPF
RBC MORPH BLD: NORMAL
SODIUM SERPL-SCNC: 136 MMOL/L (ref 135–147)
SP GR UR STRIP.AUTO: 1.02 (ref 1–1.03)
T WAVE AXIS: 58 DEGREES
UROBILINOGEN UR QL STRIP.AUTO: 0.2 E.U./DL
VENTRICULAR RATE: 79 BPM
WBC # BLD AUTO: 8.99 THOUSAND/UL (ref 4.31–10.16)
WBC #/AREA URNS AUTO: ABNORMAL /HPF

## 2023-03-16 RX ORDER — MECLIZINE HYDROCHLORIDE 25 MG/1
25 TABLET ORAL ONCE
Status: COMPLETED | OUTPATIENT
Start: 2023-03-16 | End: 2023-03-16

## 2023-03-16 RX ORDER — MECLIZINE HYDROCHLORIDE 25 MG/1
25 TABLET ORAL 3 TIMES DAILY PRN
Qty: 30 TABLET | Refills: 0 | Status: SHIPPED | OUTPATIENT
Start: 2023-03-16

## 2023-03-16 RX ORDER — ONDANSETRON 4 MG/1
4 TABLET, ORALLY DISINTEGRATING ORAL EVERY 8 HOURS PRN
Qty: 12 TABLET | Refills: 0 | Status: SHIPPED | OUTPATIENT
Start: 2023-03-16

## 2023-03-16 RX ORDER — ONDANSETRON 2 MG/ML
4 INJECTION INTRAMUSCULAR; INTRAVENOUS ONCE
Status: COMPLETED | OUTPATIENT
Start: 2023-03-16 | End: 2023-03-16

## 2023-03-16 RX ORDER — FLUTICASONE PROPIONATE 50 MCG
1 SPRAY, SUSPENSION (ML) NASAL DAILY
Qty: 16 G | Refills: 0 | Status: SHIPPED | OUTPATIENT
Start: 2023-03-16

## 2023-03-16 RX ADMIN — SODIUM CHLORIDE 1000 ML: 0.9 INJECTION, SOLUTION INTRAVENOUS at 11:49

## 2023-03-16 RX ADMIN — ONDANSETRON HYDROCHLORIDE 4 MG: 2 SOLUTION INTRAMUSCULAR; INTRAVENOUS at 11:52

## 2023-03-16 RX ADMIN — IOHEXOL 85 ML: 350 INJECTION, SOLUTION INTRAVENOUS at 14:05

## 2023-03-16 RX ADMIN — MECLIZINE HYDROCHLORIDE 25 MG: 25 TABLET ORAL at 11:54

## 2023-03-16 NOTE — DISCHARGE INSTRUCTIONS
Regrese por fiebre persistente de más de 101, vómitos persistentes, dificultad para respirar, empeoramiento de los síntomas o por cualquier inquietud  Return for any trouble breathing, persistent vomiting, fever more than 101, worsening symptoms or for any concerns

## 2023-03-16 NOTE — Clinical Note
Ene Wells was seen and treated in our emergency department on 3/16/2023  Diagnosis:     Anatoliy  may return to work on return date  She may return on this date: 03/17/2023         If you have any questions or concerns, please don't hesitate to call        Omega Mcgowan, DO    ______________________________           _______________          _______________  Hospital Representative                              Date                                Time

## 2023-03-16 NOTE — Clinical Note
Trayyaa Crystal was seen and treated in our emergency department on 3/16/2023  Diagnosis:     Anatoliy  may return to work on return date  She may return on this date: 03/17/2023         If you have any questions or concerns, please don't hesitate to call        Nick Garcia DO    ______________________________           _______________          _______________  Hospital Representative                              Date                                Time

## 2023-03-16 NOTE — ED PROVIDER NOTES
History  Chief Complaint   Patient presents with   • Dizziness     Pt c/o dizziness when she moves quickly, when laying down, getting up,  or when she is bending over  these symptoms have been present for about 4-5 days  Pt said she did fall Tuesday and Wednesday denies hitting her head  Pt denies sob and cp      23y F w/ hx of HIV, here for evaluation of dizziness for the last 4-5 days  Reports a room spinning/self spinning sensation that waxes/wanes w/ activity/head movement  When severe, assoc w/ n/v  Denies f/c/s, no HA, no congestion, no ear pain/fullness, no changes in hearing, no tinnitus, no ear drainage, no cough, no cp, no sob/esquivel, no abd pain, no d/c, no dysuria, no frequency, no rashes  Had URI 2/19 that has since resolved  No recent changes in medications, no sick contacts  No hx of similar symptoms in the past      History provided by:  Patient   used: No    Dizziness  Quality:  Room spinning and head spinning  Severity:  Severe  Onset quality:  Gradual  Duration:  5 days  Timing:  Intermittent  Progression:  Waxing and waning  Chronicity:  New  Context: bending over, eye movement, head movement and standing up    Relieved by:  Nothing  Worsened by: Movement  Ineffective treatments:  Being still  Associated symptoms: nausea and vomiting    Associated symptoms: no blood in stool, no chest pain, no diarrhea, no headaches, no hearing loss, no palpitations, no shortness of breath, no syncope, no tinnitus, no vision changes and no weakness    Risk factors: no hx of vertigo, no multiple medications and no new medications        Prior to Admission Medications   Prescriptions Last Dose Informant Patient Reported?  Taking?   acetaminophen (TYLENOL) 325 mg tablet   No No   Sig: Take 2 tablets (650 mg total) by mouth every 6 (six) hours as needed for mild pain   bictegravir-emtricitab-tenofovir alafenamide (Biktarvy) -25 MG tablet   Yes No   Sig: Take 1 tablet by mouth daily hyoscyamine (ANASPAZ,LEVSIN) 0 125 MG tablet   No No   Sig: Take 1 tablet (0 125 mg total) by mouth every 4 (four) hours as needed for cramping   Patient not taking: Reported on 2021      Facility-Administered Medications: None       Past Medical History:   Diagnosis Date   • HIV (human immunodeficiency virus infection) ([facility] [address] )        Past Surgical History:   Procedure Laterality Date   •  SECTION     • CHOLECYSTECTOMY         History reviewed  No pertinent family history  I have reviewed and agree with the history as documented  E-Cigarette/Vaping   • E-Cigarette Use Never User      E-Cigarette/Vaping Substances   • Nicotine No    • THC No    • CBD No    • Flavoring No    • Other No    • Unknown No      Social History     Tobacco Use   • Smoking status: Never   • Smokeless tobacco: Never   Vaping Use   • Vaping Use: Never used   Substance Use Topics   • Alcohol use: Not Currently   • Drug use: Not Currently     Types: Marijuana       Review of Systems   HENT: Negative for hearing loss and tinnitus  Respiratory: Negative for shortness of breath  Cardiovascular: Negative for chest pain, palpitations and syncope  Gastrointestinal: Positive for nausea and vomiting  Negative for blood in stool and diarrhea  Neurological: Positive for dizziness  Negative for weakness and headaches  All other systems reviewed and are negative  Physical Exam  Physical Exam  Vitals and nursing note reviewed  Constitutional:       General: She is not in acute distress  Appearance: Normal appearance  She is obese  She is not ill-appearing, toxic-appearing or diaphoretic  HENT:      Head: Normocephalic and atraumatic  Right Ear: Tympanic membrane normal       Left Ear: Tympanic membrane normal       Nose: Nose normal       Mouth/Throat:      Mouth: Mucous membranes are moist    Eyes:      Extraocular Movements: Extraocular movements intact        Conjunctiva/sclera: Conjunctivae normal  Pupils: Pupils are equal, round, and reactive to light  Cardiovascular:      Rate and Rhythm: Normal rate and regular rhythm  Heart sounds: No murmur heard  Pulmonary:      Effort: Pulmonary effort is normal       Breath sounds: Normal breath sounds  Musculoskeletal:         General: No deformity  Cervical back: Normal range of motion and neck supple  No rigidity  Skin:     General: Skin is warm  Capillary Refill: Capillary refill takes less than 2 seconds  Neurological:      Mental Status: She is alert  GCS: GCS eye subscore is 4  GCS verbal subscore is 5  GCS motor subscore is 6  Cranial Nerves: Cranial nerves 2-12 are intact  Sensory: Sensation is intact  Motor: Motor function is intact  Coordination: Coordination is intact     Psychiatric:         Mood and Affect: Mood normal          Vital Signs  ED Triage Vitals   Temperature Pulse Respirations Blood Pressure SpO2   03/16/23 1041 03/16/23 1040 03/16/23 1040 03/16/23 1040 03/16/23 1040   97 8 °F (36 6 °C) 86 17 125/83 100 %      Temp Source Heart Rate Source Patient Position - Orthostatic VS BP Location FiO2 (%)   03/16/23 1041 03/16/23 1430 03/16/23 1040 03/16/23 1040 --   Oral Monitor Sitting Right arm       Pain Score       03/16/23 1400       No Pain           Vitals:    03/16/23 1040 03/16/23 1400 03/16/23 1430   BP: 125/83 (!) 88/56 94/56   Pulse: 86 72 69   Patient Position - Orthostatic VS: Sitting Lying Lying         Visual Acuity  Visual Acuity    Flowsheet Row Most Recent Value   L Pupil Size (mm) 4   R Pupil Size (mm) 4          ED Medications  Medications   sodium chloride 0 9 % bolus 1,000 mL (0 mL Intravenous Stopped 3/16/23 1455)   ondansetron (ZOFRAN) injection 4 mg (4 mg Intravenous Given 3/16/23 1152)   meclizine (ANTIVERT) tablet 25 mg (25 mg Oral Given 3/16/23 1154)   iohexol (OMNIPAQUE) 350 MG/ML injection (SINGLE-DOSE) 85 mL (85 mL Intravenous Given 3/16/23 1405)       Diagnostic Studies  Results Reviewed     Procedure Component Value Units Date/Time    Manual Differential(PHLEBS Do Not Order) [[id number]]  (Abnormal) Collected: 03/16/23 1148    Lab Status: Final result Specimen: Blood from Arm, Right Updated: 03/16/23 1245     Segmented % 37 %      Bands % 1 %      Lymphocytes % 51 %      Monocytes % 7 %      Eosinophils, % 4 %      Basophils % 0 %      Absolute Neutrophils 3 42 Thousand/uL      Lymphocytes Absolute 4 58 Thousand/uL      Monocytes Absolute 0 63 Thousand/uL      Eosinophils Absolute 0 36 Thousand/uL      Basophils Absolute 0 00 Thousand/uL      Total Counted --     RBC Morphology Normal     Platelet Estimate Adequate    Basic metabolic panel [[id number]] Collected: 03/16/23 1148    Lab Status: Final result Specimen: Blood from Arm, Right Updated: 03/16/23 1235     Sodium 136 mmol/L      Potassium 4 0 mmol/L      Chloride 102 mmol/L      CO2 28 mmol/L      ANION GAP 6 mmol/L      BUN 8 mg/dL      Creatinine 0 62 mg/dL      Glucose 84 mg/dL      Calcium 9 8 mg/dL      eGFR 127 ml/min/1 73sq m     Narrative:      [facility] guidelines for Chronic Kidney Disease (CKD):   •  Stage 1 with normal or high GFR (GFR > 90 mL/min/1 73 square meters)  •  Stage 2 Mild CKD (GFR = 60-89 mL/min/1 73 square meters)  •  Stage 3A Moderate CKD (GFR = 45-59 mL/min/1 73 square meters)  •  Stage 3B Moderate CKD (GFR = 30-44 mL/min/1 73 square meters)  •  Stage 4 Severe CKD (GFR = 15-29 mL/min/1 73 square meters)  •  Stage 5 End Stage CKD (GFR <15 mL/min/1 73 square meters)  Note: GFR calculation is accurate only with a steady state creatinine    CBC and differential [[id number]]  (Abnormal) Collected: 03/16/23 1148    Lab Status: Final result Specimen: Blood from Arm, Right Updated: 03/16/23 1227     WBC 8 99 Thousand/uL      RBC 4 95 Million/uL      Hemoglobin 12 9 g/dL      Hematocrit 40 3 %      MCV 81 fL      MCH 26 1 pg      MCHC 32 0 g/dL      RDW 13 5 %      MPV 11 0 fL Platelets 860 Thousands/uL     Narrative: This is an appended report  These results have been appended to a previously verified report  Urine Microscopic [[id number]]  (Abnormal) Collected: 03/16/23 1158    Lab Status: Final result Specimen: Urine, Clean Catch Updated: 03/16/23 1224     RBC, UA 1-2 /hpf      WBC, UA 2-4 /hpf      Epithelial Cells Occasional /hpf      Bacteria, UA None Seen /hpf     POCT pregnancy, urine [[id number]]  (Normal) Resulted: 03/16/23 1204    Lab Status: Final result Updated: 03/16/23 1204     EXT Preg Test, Ur Negative     Control Valid    Urine Macroscopic, POC [[id number]]  (Abnormal) Collected: 03/16/23 1158    Lab Status: Final result Specimen: Urine Updated: 03/16/23 1159     Color, UA Yellow     Clarity, UA Clear     pH, UA 8 0     Leukocytes, UA Negative     Nitrite, UA Negative     Protein, UA Negative mg/dl      Glucose, UA Negative mg/dl      Ketones, UA Negative mg/dl      Urobilinogen, UA 0 2 E U /dl      Bilirubin, UA Negative     Occult Blood, UA Small     Specific [place], UA 1 020    Narrative:      CLINITEK RESULT                 CTA head and neck with and without contrast   ED Interpretation by Liza King DO (03/16 1429)   See below      Final Result by Celia Barroso MD (03/16 1417)      No acute intracranial abnormality  Negative CTA head and neck for large vessel occlusion, dissection, aneurysm, or high-grade stenosis  Workstation performed: [id number]         XR chest 1 view portable   Final Result by Meg Vivar MD (03/16 1404)      No acute cardiopulmonary disease                 Workstation performed: [id number]                    Procedures  ECG 12 Lead Documentation Only    Date/Time: 3/16/2023 11:35 AM  Performed by: Liza King DO  Authorized by: Liza King DO     Indications / Diagnosis:  Vertigo  ECG reviewed by me, the ED Provider: yes    Patient location:  ED  Previous ECG:     Previous ECG:  Compared to current    Similarity:  No change  Interpretation:     Interpretation: normal    Rate:     ECG rate:  79    ECG rate assessment: normal    Rhythm:     Rhythm: sinus rhythm    QRS:     QRS axis:  Normal  Conduction:     Conduction: normal    ST segments:     ST segments:  Normal  T waves:     T waves: normal               ED Course  ED Course as of 03/16/23 1854   Thu Mar 16, 2023   1225 WBC: 8 99   1349 Still awaiting CTA   1448 Feels better  Will give rx for flonase for serous otitis and meclizine prn for dizziness w/ ENT / PCM f/u                               SBIRT 20yo+    Flowsheet Row Most Recent Value   SBIRT (25 yo +)    In order to provide better care to our patients, we are screening all of our patients for alcohol and drug use  Would it be okay to ask you these screening questions? No Filed at: 03/16/2023 1045                    Medical Decision Making  19y F w/ HIV, compliant w/ tx here w/ dizzy/vertigo  ddx includes posterior cva, positional vertigo, labyrinthitis, metabolic abnl, arrhythmia  Will get cta h/n, ekg, labs, give zofran, meclizine for symptomatic tx and re-eval    Acute serous otitis media: undiagnosed new problem with uncertain prognosis  Benign positional vertigo: acute illness or injury that poses a threat to life or bodily functions  Amount and/or Complexity of Data Reviewed  External Data Reviewed: notes  Details: [facility] notes reviewed  Immunization hx reviewed  Labs: ordered  Decision-making details documented in ED Course  Radiology: ordered  ECG/medicine tests: ordered and independent interpretation performed  Risk  Prescription drug management            Disposition  Final diagnoses:   Benign positional vertigo   Acute serous otitis media     Time reflects when diagnosis was documented in both MDM as applicable and the Disposition within this note     Time User Action Codes Description Comment    3/16/2023  2:49 PM Mi Lentz Add [H81 10] Benign positional vertigo     3/16/2023  2:49 PM Mi Lentz Add [H65 00] Acute serous otitis media       ED Disposition     ED Disposition   Discharge    Condition   Stable    Date/Time   Thu Mar 16, 2023  2:49 PM    [address] discharge to home/self care                 Follow-up Information     Follow up With Specialties Details Why Contact Info Additional [address] [facility] Otolaryngology Schedule an appointment as soon as possible for a visit in 1 week If symptoms worsen or if no improvement [address] [id number]  [address] [facility] [place], [address] [place], [place] [address]    Horacio Hermes, [address]  Nurse Practitioner Schedule an appointment as soon as possible for a visit in 1 week for further evaluation and treatment [address] [facility] [address]  [phone]             Discharge Medication List as of 3/16/2023  3:00 PM      START taking these medications    Details   fluticasone (FLONASE) 50 mcg/act nasal spray 1 spray into each nostril daily, Starting Thu 3/16/2023, Normal      meclizine (ANTIVERT) 25 mg tablet Take 1 tablet (25 mg total) by mouth 3 (three) times a day as needed for dizziness, Starting Thu 3/16/2023, Normal      ondansetron (ZOFRAN-ODT) 4 mg disintegrating tablet Take 1 tablet (4 mg total) by mouth every 8 (eight) hours as needed for nausea or vomiting, Starting Thu 3/16/2023, Normal         CONTINUE these medications which have NOT CHANGED    Details   acetaminophen (TYLENOL) 325 mg tablet Take 2 tablets (650 mg total) by mouth every 6 (six) hours as needed for mild pain, Starting Fri 12/24/2021, Print      bictegravir-emtricitab-tenofovir alafenamide (Biktarvy) -25 MG tablet Take 1 tablet by mouth daily, Starting Thu 7/21/2022, Historical Med      hyoscyamine (ANASPAZ,LEVSIN) 0 125 MG tablet Take 1 tablet (0 125 mg total) by mouth every 4 (four) hours as needed for cramping, Starting Sat 2/22/2020, Print             No discharge procedures on file      PDMP Review     None          ED Provider  Electronically Signed by           Dominick Castelan DO  03/16/23 9374

## 2023-03-16 NOTE — Clinical Note
Janan Spurling was seen and treated in our emergency department on 3/16/2023  Diagnosis:     Anatoliy  may return to work on return date  She may return on this date: 03/17/2023         If you have any questions or concerns, please don't hesitate to call        Ira Fillers, DO    ______________________________           _______________          _______________  Hospital Representative                              Date                                Time

## 2023-03-17 ENCOUNTER — PATIENT OUTREACH (OUTPATIENT)
Dept: SURGERY | Facility: CLINIC | Age: 23
End: 2023-03-17

## 2023-03-17 NOTE — PROGRESS NOTES
HC got TBRA check requisition signed and uploaded to [employer]  HC sent to [facility] for processing and accounts payable  HC contacted Ct with a reminder for appt for TBRA renewal on the 18th

## 2023-03-20 ENCOUNTER — PATIENT OUTREACH (OUTPATIENT)
Dept: SURGERY | Facility: CLINIC | Age: 23
End: 2023-03-20

## 2023-03-20 NOTE — PROGRESS NOTES
HC met with Ct for TBRA update, ct food stamps was hacked and ct was able to get a new food stamps and also has no problems in the units  Ct states she is having issues with electricity and she has applied for [employer]  Ct states she has working income at a small [employer] and is currently being paid via stipend  Ct states owner will write letter  Ct states that he pays every 2 weeks  Ct states she is having health issues, is having vertigo symptoms and has a doctor appointment for treatment  HC requested additional documentation from ct for accurate rent portion calculation and TBRA update  Ct states that she is also having trouble obtaining enough supplies and products for her children  HC went over authorization, termination policy, fraud and noncompliance policy with ct and medical adherence policy  Ct states there are no other issues within the apartment  Ct states she is very happy, she is currently saving up for a car  Ct disclosed appointment information from previous visit at [facility], [address] states that she was upset with [facility] due to testing of her child for HIV  Ct states that her CM is Jeff  Ct states that she has talked with her child's father about child support payments but, he has not updated her with child support and it has been one month since she last heard from him  HC completed 6 month assessment with ct, Ct is not taking medication at the moment under doctor supervision for one week and will resume taking medication once ct finishes the course of another med due to stomach issues  Ct still in active dental care and continues to adhere to medication requirements  HC uploaded acuity scale to media

## 2023-03-22 ENCOUNTER — PATIENT OUTREACH (OUTPATIENT)
Dept: SURGERY | Facility: CLINIC | Age: 23
End: 2023-03-22

## 2023-03-22 NOTE — PROGRESS NOTES
HC received updated income documentation from current employer, [employer] OF [employer] [facility] [employer]  contacted Ct to let Ct know that TBRA application is now undergoing review and will contact Ct when HS approves of authorization and new rent amount  Ct states she understood, will provide documentation when requested

## 2023-03-23 ENCOUNTER — PATIENT OUTREACH (OUTPATIENT)
Dept: SURGERY | Facility: CLINIC | Age: 23
End: 2023-03-23

## 2023-03-27 NOTE — PROGRESS NOTES
HC completed, organized, and submitted TBRA application to   Augustina drafted new rent amount and update for Augustina villanueva let Ct know that once approval is received, will follow up

## 2023-05-22 ENCOUNTER — PATIENT OUTREACH (OUTPATIENT)
Dept: SURGERY | Facility: CLINIC | Age: 23
End: 2023-05-22

## 2023-05-24 NOTE — PROGRESS NOTES
[facility] OF [facility]  completed June TBRA check requisition signed and uploaded to media  HC sent to [facility] for processing and accounts payable

## 2023-06-16 ENCOUNTER — PATIENT OUTREACH (OUTPATIENT)
Dept: SURGERY | Facility: CLINIC | Age: 23
End: 2023-06-16

## 2023-06-16 NOTE — PROGRESS NOTES
[facility] OF [facility]  completed July check requisition signed and uploaded to media  HC sent to [facility] for processing and accounts payable

## 2023-07-14 ENCOUNTER — PATIENT OUTREACH (OUTPATIENT)
Dept: SURGERY | Facility: CLINIC | Age: 23
End: 2023-07-14

## 2023-07-14 NOTE — PROGRESS NOTES
[facility] OF [facility]. completed August check requisition signed and uploaded to media. HC sent to [facility] for processing and accounts payable.

## 2023-08-01 ENCOUNTER — PATIENT OUTREACH (OUTPATIENT)
Dept: SURGERY | Facility: CLINIC | Age: 23
End: 2023-08-01

## 2023-08-01 NOTE — PROGRESS NOTES
HC went over ct file due to issues with careware, ct file and review of recert date with [facility]

## 2023-08-04 ENCOUNTER — APPOINTMENT (EMERGENCY)
Dept: RADIOLOGY | Facility: HOSPITAL | Age: 23
End: 2023-08-04
Payer: MEDICARE

## 2023-08-04 ENCOUNTER — HOSPITAL ENCOUNTER (EMERGENCY)
Facility: HOSPITAL | Age: 23
Discharge: HOME | End: 2023-08-04
Attending: EMERGENCY MEDICINE
Payer: MEDICARE

## 2023-08-04 VITALS
TEMPERATURE: 97.8 F | OXYGEN SATURATION: 100 % | DIASTOLIC BLOOD PRESSURE: 81 MMHG | HEIGHT: 61 IN | RESPIRATION RATE: 19 BRPM | SYSTOLIC BLOOD PRESSURE: 136 MMHG | HEART RATE: 95 BPM | BODY MASS INDEX: 37.29 KG/M2 | WEIGHT: 197.53 LBS

## 2023-08-04 DIAGNOSIS — S93.409A ANKLE SPRAIN: Primary | ICD-10-CM

## 2023-08-04 DIAGNOSIS — S99.911A INJURY OF RIGHT ANKLE, INITIAL ENCOUNTER: ICD-10-CM

## 2023-08-04 PROCEDURE — 29515 APPLICATION SHORT LEG SPLINT: CPT | Performed by: PHYSICIAN ASSISTANT

## 2023-08-04 PROCEDURE — 99284 EMERGENCY DEPT VISIT MOD MDM: CPT | Performed by: PHYSICIAN ASSISTANT

## 2023-08-04 PROCEDURE — 73610 X-RAY EXAM OF ANKLE: CPT

## 2023-08-04 PROCEDURE — 99283 EMERGENCY DEPT VISIT LOW MDM: CPT

## 2023-08-04 PROCEDURE — 73630 X-RAY EXAM OF FOOT: CPT

## 2023-08-04 RX ORDER — NAPROXEN 500 MG/1
500 TABLET ORAL 2 TIMES DAILY WITH MEALS
Qty: 30 TABLET | Refills: 0 | Status: SHIPPED | OUTPATIENT
Start: 2023-08-04

## 2023-08-04 RX ORDER — NAPROXEN 500 MG/1
500 TABLET ORAL ONCE
Status: COMPLETED | OUTPATIENT
Start: 2023-08-04 | End: 2023-08-04

## 2023-08-04 RX ORDER — OXYCODONE HYDROCHLORIDE AND ACETAMINOPHEN 5; 325 MG/1; MG/1
1 TABLET ORAL ONCE
Status: COMPLETED | OUTPATIENT
Start: 2023-08-04 | End: 2023-08-04

## 2023-08-04 RX ADMIN — OXYCODONE HYDROCHLORIDE AND ACETAMINOPHEN 1 TABLET: 5; 325 TABLET ORAL at 04:43

## 2023-08-04 RX ADMIN — NAPROXEN 500 MG: 500 TABLET ORAL at 04:06

## 2023-08-04 NOTE — ED PROVIDER NOTES
History  Chief Complaint   Patient presents with   • Foot Injury     Jeannette Cranker down some steps while carrying her baby - now with right foot ankle swelling and pain. 59-year-old female with history of HIV presents complaining of right ankle pain and swelling after fall that occurred just prior to arrival.  Patient states she was carrying her baby when she missed a step and twisted her ankle. Denies head strike LOC or any other injury sustained. Has not been able to ambulate since then. Denies any other complaints       History provided by:  Patient   used: No        Prior to Admission Medications   Prescriptions Last Dose Informant Patient Reported? Taking?   acetaminophen (TYLENOL) 325 mg tablet   No No   Sig: Take 2 tablets (650 mg total) by mouth every 6 (six) hours as needed for mild pain   bictegravir-emtricitab-tenofovir alafenamide (Biktarvy) -25 MG tablet   Yes No   Sig: Take 1 tablet by mouth daily   fluticasone (FLONASE) 50 mcg/act nasal spray   No No   Si spray into each nostril daily   hyoscyamine (ANASPAZ,LEVSIN) 0.125 MG tablet   No No   Sig: Take 1 tablet (0.125 mg total) by mouth every 4 (four) hours as needed for cramping   Patient not taking: Reported on 2021   meclizine (ANTIVERT) 25 mg tablet   No No   Sig: Take 1 tablet (25 mg total) by mouth 3 (three) times a day as needed for dizziness   ondansetron (ZOFRAN-ODT) 4 mg disintegrating tablet   No No   Sig: Take 1 tablet (4 mg total) by mouth every 8 (eight) hours as needed for nausea or vomiting      Facility-Administered Medications: None       Past Medical History:   Diagnosis Date   • HIV (human immunodeficiency virus infection) ([address])        Past Surgical History:   Procedure Laterality Date   •  SECTION     • CHOLECYSTECTOMY         History reviewed. No pertinent family history. I have reviewed and agree with the history as documented.     E-Cigarette/Vaping   • E-Cigarette Use Never User E-Cigarette/Vaping Substances   • Nicotine No    • THC No    • CBD No    • Flavoring No    • Other No    • Unknown No      Social History     Tobacco Use   • Smoking status: Never   • Smokeless tobacco: Never   Vaping Use   • Vaping Use: Never used   Substance Use Topics   • Alcohol use: Not Currently   • Drug use: Not Currently     Types: Marijuana       Review of Systems   Constitutional: Negative. Negative for chills and fatigue. HENT: Negative for ear pain and sore throat. Eyes: Negative for photophobia and redness. Respiratory: Negative for apnea, cough and shortness of breath. Cardiovascular: Negative for chest pain. Gastrointestinal: Negative for abdominal pain, nausea and vomiting. Genitourinary: Negative for dysuria. Musculoskeletal: Positive for arthralgias (R ankle pain ). Negative for neck pain and neck stiffness. Skin: Negative for rash. Neurological: Negative for dizziness, tremors, syncope and weakness. Psychiatric/Behavioral: Negative for suicidal ideas. Physical Exam  Physical Exam  Constitutional:       General: She is not in acute distress. Appearance: She is well-developed. She is not diaphoretic. Eyes:      Pupils: Pupils are equal, round, and reactive to light. Cardiovascular:      Rate and Rhythm: Normal rate and regular rhythm. Pulmonary:      Effort: Pulmonary effort is normal. No respiratory distress. Breath sounds: Normal breath sounds. Abdominal:      General: Bowel sounds are normal. There is no distension. Palpations: Abdomen is soft. Musculoskeletal:         General: Normal range of motion. Cervical back: Normal range of motion and neck supple. Comments: R ankle with swelling around the lateral malleolus as well as midfoot. Dorsalis pedis pulse 2+. Tenderness along the fifth metatarsal.  No tenderness at the proximal fibular head. Neurovascular intact distally. Skin:     General: Skin is warm and dry. Neurological:      Mental Status: She is alert and oriented to person, place, and time. Vital Signs  ED Triage Vitals   Temperature Pulse Respirations Blood Pressure SpO2   08/04/23 0353 08/04/23 0353 08/04/23 0353 08/04/23 0353 08/04/23 0353   97.8 °F (36.6 °C) 95 19 136/81 100 %      Temp Source Heart Rate Source Patient Position - Orthostatic VS BP Location FiO2 (%)   08/04/23 0353 08/04/23 0353 08/04/23 0353 08/04/23 0353 --   Oral Monitor Lying Left arm       Pain Score       08/04/23 0406       10 - Worst Possible Pain           Vitals:    08/04/23 0353   BP: 136/81   Pulse: 95   Patient Position - Orthostatic VS: Lying         Visual Acuity      ED Medications  Medications   naproxen (NAPROSYN) tablet 500 mg (500 mg Oral Given 8/4/23 0406)   oxyCODONE-acetaminophen (PERCOCET) 5-325 mg per tablet 1 tablet (1 tablet Oral Given 8/4/23 0443)       Diagnostic Studies  Results Reviewed     None                 XR foot 3+ views RIGHT   ED Interpretation by Ronnie Gomez PA-C (08/04 0439)   No obvious osseous abnormality       XR ankle 3+ views RIGHT    (Results Pending)              Procedures  Splint application    Date/Time: 8/4/2023 4:40 AM    Performed by: Ronnie Gomez PA-C  Authorized by: Ronnie Gomez PA-C  Universal Protocol:  Procedure performed by:  Consent: Verbal consent obtained. Risks and benefits: risks, benefits and alternatives were discussed  Consent given by: patient  Time out: Immediately prior to procedure a "time out" was called to verify the correct patient, procedure, equipment, support staff and site/side marked as required.   Patient understanding: patient states understanding of the procedure being performed  Patient consent: the patient's understanding of the procedure matches consent given  Procedure consent: procedure consent matches procedure scheduled  Relevant documents: relevant documents present and verified  Test results: test results available and properly labeled  Site marked: the operative site was marked  Radiology Images displayed and confirmed. If images not available, report reviewed: imaging studies available  Required items: required blood products, implants, devices, and special equipment available  Patient identity confirmed: verbally with patient      Pre-procedure details:     Sensation:  Normal  Procedure details:     Laterality:  Right    Location:  Ankle    Ankle:  R ankle    Strapping: no  Cast type:  Short leg      Splint type:  Short leg (STATIC)    Supplies:  Ortho-Glass  Post-procedure details:     Pain:  Unchanged    Sensation:  Normal    Patient tolerance of procedure: Tolerated well, no immediate complications             ED Course                               SBIRT 22yo+    Flowsheet Row Most Recent Value   Initial Alcohol Screen: US AUDIT-C     1. How often do you have a drink containing alcohol? 0 Filed at: 08/04/2023 0351   2. How many drinks containing alcohol do you have on a typical day you are drinking? 0 Filed at: 08/04/2023 0351   3a. Male UNDER 65: How often do you have five or more drinks on one occasion? 0 Filed at: 08/04/2023 0351   3b. FEMALE Any Age, or MALE 65+: How often do you have 4 or more drinks on one occassion? 0 Filed at: 08/04/2023 0351   Audit-C Score 0 Filed at: 08/04/2023 2900   PUMA: How many times in the past year have you. .. Used an illegal drug or used a prescription medication for non-medical reasons? Never Filed at: 08/04/2023 9471                    Medical Decision Making  Case discussed with attending. No obvious focal abnormality seen on imaging today however given degree of pain, swelling and inability to bear weight plan to place splint and given orthopedic follow-up as well as nonweightbearing status with crutches. Patient educated on supportive care. Given return precautions. Discharged home.     Amount and/or Complexity of Data Reviewed  Radiology: ordered and independent interpretation performed. Risk  Prescription drug management. Disposition  Final diagnoses: Ankle sprain   Injury of right ankle, initial encounter     Time reflects when diagnosis was documented in both MDM as applicable and the Disposition within this note     Time User Action Codes Description Comment    8/4/2023  4:38 AM Shaniqua Villatoro Add [S93.409A] Ankle sprain     8/4/2023  5:11 AM Shaniqua Villatoro Add [O62.470Q] Injury of right ankle, initial encounter       ED Disposition     ED Disposition   Discharge    Condition   Stable    Date/Time   Fri Aug 4, 2023  5:11 AM    Comment   Oralee Fails discharge to home/self care.                Follow-up Information     Follow up With Specialties Details Why Contact Info Additional [address] [facility] Emergency Medicine Go to  If symptoms worsen [address] [id number]  [address] [facility] Emergency Department     [address] [facility] Schedule an appointment as soon as possible for a visit in 1 day For wound re-check [address]  [facility] [address] [facility] [phone]  [address] [facility], [address] [place], [place], [address]   [phone]          Discharge Medication List as of 8/4/2023  5:13 AM      START taking these medications    Details   naproxen (Naprosyn) 500 mg tablet Take 1 tablet (500 mg total) by mouth 2 (two) times a day with meals, Starting Fri 8/4/2023, Normal         CONTINUE these medications which have NOT CHANGED    Details   acetaminophen (TYLENOL) 325 mg tablet Take 2 tablets (650 mg total) by mouth every 6 (six) hours as needed for mild pain, Starting Fri 12/24/2021, Print      bictegravir-emtricitab-tenofovir alafenamide (Biktarvy) -25 MG tablet Take 1 tablet by mouth daily, Starting Thu 7/21/2022, Historical Med      fluticasone (FLONASE) 50 mcg/act nasal spray 1 spray into each nostril daily, Starting Thu 3/16/2023, Normal      hyoscyamine (ANASPAZ,LEVSIN) 0.125 MG tablet Take 1 tablet (0.125 mg total) by mouth every 4 (four) hours as needed for cramping, Starting Sat 2/22/2020, Print      meclizine (ANTIVERT) 25 mg tablet Take 1 tablet (25 mg total) by mouth 3 (three) times a day as needed for dizziness, Starting Thu 3/16/2023, Normal      ondansetron (ZOFRAN-ODT) 4 mg disintegrating tablet Take 1 tablet (4 mg total) by mouth every 8 (eight) hours as needed for nausea or vomiting, Starting Thu 3/16/2023, Normal             No discharge procedures on file.     PDMP Review     None          ED Provider  Electronically Signed by           Emeterio Gutierrez PA-C  08/04/23 0522

## 2023-08-04 NOTE — Clinical Note
Ileana Kessler was seen and treated in our emergency department on 8/4/2023. No restrictions            Diagnosis:     Anatoliy  may return to work on return date. She may return on this date: 08/09/2023         If you have any questions or concerns, please don't hesitate to call.       Bharat Landa PA-C    ______________________________           _______________          _______________  Hospital Representative                              Date                                Time

## 2023-08-16 ENCOUNTER — PATIENT OUTREACH (OUTPATIENT)
Dept: SURGERY | Facility: CLINIC | Age: 23
End: 2023-08-16

## 2023-08-17 NOTE — PROGRESS NOTES
[facility] OF [facility] completed September check requisition and signed. HC sent to [facility] for processing and accounts payable.

## 2023-08-29 ENCOUNTER — PATIENT OUTREACH (OUTPATIENT)
Dept: SURGERY | Facility: CLINIC | Age: 23
End: 2023-08-29

## 2023-08-29 NOTE — PROGRESS NOTES
HC contacted Ct for recert, no response and left vm. Ct called HC back, states she goes to [facility]. Ct states she still [facility] and her last visit was last September. Ct having issues with chewing hard foods due to crown issues and went to dental clinic [address] One month of issues with crowns and ct is experiencing pain. Ct CM is Lu and continues to go to her HIV doctor. Ct has [address] every 3 months, sees Dr. Nirali Abbott and has no issues and or protection because ct is not currently SA and ct states she has to do labs every 6 months. Ct reports no issues with her unit, states she really enjoys the unit and the landlord as well. HC scheduled new appt for Ct for October 4th at 1pm for TBRA update. HC let Ct know that [facility] will be sending out letter soon for TBRA updates. HC requested ID and Insurance from Ct, no in-person due to COVID-19. [facility] drafted request for lab from provider. HC to uplaod RW part B once labs received     Ct acuity is 1, ct is able to follow up without assistance, transportation is provided for and adherent to medication and no MH or DTX/SA.

## 2023-08-31 ENCOUNTER — PATIENT OUTREACH (OUTPATIENT)
Dept: SURGERY | Facility: CLINIC | Age: 23
End: 2023-08-31

## 2023-09-01 NOTE — PROGRESS NOTES
[facility] OF [facility]. sent Ct reminder text for upcoming appt in October and for requested documentation for completed recert

## 2023-09-05 ENCOUNTER — PATIENT OUTREACH (OUTPATIENT)
Dept: SURGERY | Facility: CLINIC | Age: 23
End: 2023-09-05

## 2023-09-05 NOTE — PROGRESS NOTES
HC received updated documentation from ct, ct states she was unable to send due to her phone being disconnected.

## 2023-09-06 ENCOUNTER — PATIENT OUTREACH (OUTPATIENT)
Dept: SURGERY | Facility: CLINIC | Age: 23
End: 2023-09-06

## 2023-09-17 NOTE — PROGRESS NOTES
[facility] OF [facility]. sent TBRA renewal reminder letter for Ct, Ct auth due at the end of October. HC prev made appt for Ct, HC included list of requested documentation and appt reminder.

## 2023-10-04 ENCOUNTER — PATIENT OUTREACH (OUTPATIENT)
Dept: SURGERY | Facility: CLINIC | Age: 23
End: 2023-10-04

## 2023-10-04 NOTE — PROGRESS NOTES
HC contacted Ct via text for missed appointment, no response and contacted ct left message on voicemail

## 2023-10-11 ENCOUNTER — PATIENT OUTREACH (OUTPATIENT)
Dept: SURGERY | Facility: CLINIC | Age: 23
End: 2023-10-11

## 2023-10-11 NOTE — PROGRESS NOTES
HC contacted Ct to reschedule TBRA update and Hc scheduled for 10/17/23 at 10am; ct confirmed and requested additional documents needed. HC discussed with KOURTNEY Fontana, KOURTNEY Fontana let HC know that Ct stopped by.

## 2023-10-17 ENCOUNTER — PATIENT OUTREACH (OUTPATIENT)
Dept: SURGERY | Facility: CLINIC | Age: 23
End: 2023-10-17

## 2023-10-17 NOTE — PROGRESS NOTES
HC contacted by Marie Yang wanted to know when appointment was and [facility]. let Ct know it is today at [address] [facility] met with Ct for TBRA update. HC went over housing authorization, housing policies including termination and noncompliance. HC went over grievance policy with Ct and reviewed supporting documents brought to appt by ct today. HC went over TBRA calculations with Ct and two releases of information today including the [facility] and [facility]. Ct let Hc know that she is getting bypass surgery on the 19th and meeting with nutritionist and ct states that she is in need of new lease to set up schooling for her child. HC provided lease and copies of fraud/non-compliance and termination policies. HC went over calculation with HS, Ct does not owe rent but, can use $33 towards a bill. Ct to bring electric bill to [facility]..

## 2023-10-20 ENCOUNTER — PATIENT OUTREACH (OUTPATIENT)
Dept: SURGERY | Facility: CLINIC | Age: 23
End: 2023-10-20

## 2023-10-20 NOTE — PROGRESS NOTES
HC contacted Ct for updated inspection time, Ct states that there was an incident in front of her apartment, inspection rescheduled to 10am Monday. Ct provided [facility]. consent to apply on her behalf for [facility]. Blair Page know that application did not come up and for ct to apply at a later time as ct is on waiting list for [facility].

## 2023-10-23 ENCOUNTER — PATIENT OUTREACH (OUTPATIENT)
Dept: SURGERY | Facility: CLINIC | Age: 23
End: 2023-10-23

## 2023-10-27 ENCOUNTER — PATIENT OUTREACH (OUTPATIENT)
Dept: SURGERY | Facility: CLINIC | Age: 23
End: 2023-10-27

## 2023-10-30 NOTE — PROGRESS NOTES
[facility] OF [facility] completed November check requisition and signed. HC sent to [facility] for processing and accounts payable.

## 2023-11-01 ENCOUNTER — PATIENT OUTREACH (OUTPATIENT)
Dept: SURGERY | Facility: CLINIC | Age: 23
End: 2023-11-01

## 2023-11-06 ENCOUNTER — PATIENT OUTREACH (OUTPATIENT)
Dept: SURGERY | Facility: CLINIC | Age: 23
End: 2023-11-06

## 2023-11-09 NOTE — PROGRESS NOTES
HC completed and submitted updated TBRA authorization to HS for approval for updated rent amount owed by ct.

## 2023-11-29 ENCOUNTER — PATIENT OUTREACH (OUTPATIENT)
Dept: SURGERY | Facility: CLINIC | Age: 23
End: 2023-11-29

## 2023-11-29 NOTE — PROGRESS NOTES
[facility] completed December check requisition and signed update. HC sent to [facility] for processing and accounts payable.

## 2023-11-30 ENCOUNTER — PATIENT OUTREACH (OUTPATIENT)
Dept: SURGERY | Facility: CLINIC | Age: 23
End: 2023-11-30

## 2023-11-30 NOTE — PROGRESS NOTES
HC contacted Reji Davis states that she was contacting [facility] for landlord number due to heating not working in back of apartment and landlord sent someone to fix it tomorrow. Ct provided [facility] [place] personal life updates and reports no additional issues or needs at this time.

## 2023-12-01 ENCOUNTER — HOSPITAL ENCOUNTER (EMERGENCY)
Facility: HOSPITAL | Age: 23
Discharge: HOME | End: 2023-12-01
Attending: EMERGENCY MEDICINE | Admitting: EMERGENCY MEDICINE
Payer: MEDICARE

## 2023-12-01 VITALS
DIASTOLIC BLOOD PRESSURE: 97 MMHG | HEART RATE: 80 BPM | OXYGEN SATURATION: 100 % | SYSTOLIC BLOOD PRESSURE: 131 MMHG | WEIGHT: 190.26 LBS | RESPIRATION RATE: 18 BRPM | BODY MASS INDEX: 35.95 KG/M2 | TEMPERATURE: 98 F

## 2023-12-01 DIAGNOSIS — B34.9 VIRAL SYNDROME: Primary | ICD-10-CM

## 2023-12-01 PROCEDURE — 99284 EMERGENCY DEPT VISIT MOD MDM: CPT | Performed by: EMERGENCY MEDICINE

## 2023-12-01 PROCEDURE — 99282 EMERGENCY DEPT VISIT SF MDM: CPT

## 2023-12-01 NOTE — ED PROVIDER NOTES
History  Chief Complaint   Patient presents with    Earache     B/l earache x 2 days     21 YOF with PMH HIV presents today with bilateral ear pain x2 days. Pt reports she saw her PCP on  or  and was prescribed amoxicillin, cough medicine, mucinex and motrin and tylenol. Reports her child was diagnosed with strep throat so they also treated her. Pt reports she had sore throat and cough x4-5 days. Pt denies fever, chills, vomiting. Prior to Admission Medications   Prescriptions Last Dose Informant Patient Reported? Taking?   acetaminophen (TYLENOL) 325 mg tablet   No No   Sig: Take 2 tablets (650 mg total) by mouth every 6 (six) hours as needed for mild pain   bictegravir-emtricitab-tenofovir alafenamide (Biktarvy) -25 MG tablet   Yes No   Sig: Take 1 tablet by mouth daily   fluticasone (FLONASE) 50 mcg/act nasal spray   No No   Si spray into each nostril daily   hyoscyamine (ANASPAZ,LEVSIN) 0.125 MG tablet   No No   Sig: Take 1 tablet (0.125 mg total) by mouth every 4 (four) hours as needed for cramping   Patient not taking: Reported on 2021   meclizine (ANTIVERT) 25 mg tablet   No No   Sig: Take 1 tablet (25 mg total) by mouth 3 (three) times a day as needed for dizziness   naproxen (Naprosyn) 500 mg tablet   No No   Sig: Take 1 tablet (500 mg total) by mouth 2 (two) times a day with meals   ondansetron (ZOFRAN-ODT) 4 mg disintegrating tablet   No No   Sig: Take 1 tablet (4 mg total) by mouth every 8 (eight) hours as needed for nausea or vomiting      Facility-Administered Medications: None       Past Medical History:   Diagnosis Date    HIV (human immunodeficiency virus infection) ([address])        Past Surgical History:   Procedure Laterality Date     SECTION      CHOLECYSTECTOMY         History reviewed. No pertinent family history. I have reviewed and agree with the history as documented.     E-Cigarette/Vaping    E-Cigarette Use Never User      E-Cigarette/Vaping Substances Nicotine No     THC No     CBD No     Flavoring No     Other No     Unknown No      Social History     Tobacco Use    Smoking status: Never    Smokeless tobacco: Never   Vaping Use    Vaping Use: Never used   Substance Use Topics    Alcohol use: Not Currently    Drug use: Not Currently     Types: Marijuana       Review of Systems   Constitutional:  Negative for chills and fever. HENT:  Positive for ear pain and sore throat. Respiratory:  Positive for cough. Cardiovascular:  Negative for chest pain and palpitations. Gastrointestinal:  Negative for diarrhea, nausea and vomiting. Neurological:  Negative for dizziness, light-headedness and headaches. All other systems reviewed and are negative. Physical Exam  Physical Exam  Vitals and nursing note reviewed. Constitutional:       General: She is not in acute distress. Appearance: Normal appearance. She is well-developed. She is not ill-appearing. HENT:      Head: Normocephalic and atraumatic. Right Ear: Tympanic membrane normal.      Left Ear: Tympanic membrane normal.      Mouth/Throat:      Pharynx: Posterior oropharyngeal erythema present. No oropharyngeal exudate. Eyes:      Conjunctiva/sclera: Conjunctivae normal.   Cardiovascular:      Rate and Rhythm: Normal rate and regular rhythm. Pulmonary:      Effort: Pulmonary effort is normal.   Musculoskeletal:         General: No swelling. Normal range of motion. Cervical back: Normal range of motion and neck supple. Skin:     General: Skin is warm and dry. Capillary Refill: Capillary refill takes less than 2 seconds. Neurological:      Mental Status: She is alert.    Psychiatric:         Mood and Affect: Mood normal.         Vital Signs  ED Triage Vitals [12/01/23 1034]   Temperature Pulse Respirations Blood Pressure SpO2   98 °F (36.7 °C) 80 18 131/97 100 %      Temp Source Heart Rate Source Patient Position - Orthostatic VS BP Location FiO2 (%)   Oral Monitor Sitting Right arm --      Pain Score       --           Vitals:    12/01/23 1034   BP: 131/97   Pulse: 80   Patient Position - Orthostatic VS: Sitting         Visual Acuity      ED Medications  Medications - No data to display    Diagnostic Studies  Results Reviewed       None                   No orders to display              Procedures  Procedures         ED Course                               SBIRT 20yo+      Flowsheet Row Most Recent Value   Initial Alcohol Screen: US AUDIT-C     1. How often do you have a drink containing alcohol? 0 Filed at: 12/01/2023 1107   2. How many drinks containing alcohol do you have on a typical day you are drinking? 0 Filed at: 12/01/2023 1107   3a. Male UNDER 65: How often do you have five or more drinks on one occasion? 0 Filed at: 12/01/2023 1107   3b. FEMALE Any Age, or MALE 65+: How often do you have 4 or more drinks on one occassion? 0 Filed at: 12/01/2023 1107   Audit-C Score 0 Filed at: 12/01/2023 1107                      Medical Decision Making  21 YOF presents due to bilateral ear pain. Is currently being treated for strep throat. Physical exam as noted above, no signs of otitis media. Discussed importance of continuing with amoxicillin for presumed strep throat. Discussed other supportive measures for at home including warm saline gargles, motrin, tylenol. I have discussed the plan to discharge pt from ED. The patient was discharged in stable condition. Patient ambulated off the department. Extensive return to emergency department precautions were discussed. Follow up with appropriate providers including primary care physician was discussed. Patient and/or their  primary decision maker expressed understanding. Patient remained stable during entire emergency department stay.       Problems Addressed:  Viral syndrome: acute illness or injury             Disposition  Final diagnoses:   Viral syndrome     Time reflects when diagnosis was documented in both MDM as applicable and the Disposition within this note       Time User Action Codes Description Comment    12/1/2023 11:19 AM Charles Oh Add [B34.9] Viral syndrome           ED Disposition       ED Disposition   Discharge    Condition   Stable    Date/Time   Fri Dec 1, 2023 1119    Comment   Anatoliy Leo discharge to home/self care. Follow-up Information    None         Discharge Medication List as of 12/1/2023 11:19 AM        CONTINUE these medications which have NOT CHANGED    Details   acetaminophen (TYLENOL) 325 mg tablet Take 2 tablets (650 mg total) by mouth every 6 (six) hours as needed for mild pain, Starting Fri 12/24/2021, Print      bictegravir-emtricitab-tenofovir alafenamide (Biktarvy) -25 MG tablet Take 1 tablet by mouth daily, Starting Thu 7/21/2022, Historical Med      fluticasone (FLONASE) 50 mcg/act nasal spray 1 spray into each nostril daily, Starting Thu 3/16/2023, Normal      hyoscyamine (ANASPAZ,LEVSIN) 0.125 MG tablet Take 1 tablet (0.125 mg total) by mouth every 4 (four) hours as needed for cramping, Starting Sat 2/22/2020, Print      meclizine (ANTIVERT) 25 mg tablet Take 1 tablet (25 mg total) by mouth 3 (three) times a day as needed for dizziness, Starting Thu 3/16/2023, Normal      naproxen (Naprosyn) 500 mg tablet Take 1 tablet (500 mg total) by mouth 2 (two) times a day with meals, Starting Fri 8/4/2023, Normal      ondansetron (ZOFRAN-ODT) 4 mg disintegrating tablet Take 1 tablet (4 mg total) by mouth every 8 (eight) hours as needed for nausea or vomiting, Starting Thu 3/16/2023, Normal             No discharge procedures on file.     PDMP Review       None            ED Provider  Electronically Signed by             Porsha Carrillo PA-C  12/01/23 2121

## 2023-12-05 ENCOUNTER — OFFICE VISIT (OUTPATIENT)
Dept: BARIATRICS | Facility: CLINIC | Age: 23
End: 2023-12-05
Payer: MEDICARE

## 2023-12-05 VITALS
BODY MASS INDEX: 35.91 KG/M2 | SYSTOLIC BLOOD PRESSURE: 102 MMHG | HEIGHT: 61 IN | DIASTOLIC BLOOD PRESSURE: 79 MMHG | HEART RATE: 97 BPM | WEIGHT: 190.2 LBS | RESPIRATION RATE: 16 BRPM

## 2023-12-05 DIAGNOSIS — F31.70: ICD-10-CM

## 2023-12-05 DIAGNOSIS — E66.9 OBESITY, CLASS II, BMI 35-39.9: Primary | ICD-10-CM

## 2023-12-05 PROBLEM — E66.812 OBESITY, CLASS II, BMI 35-39.9: Status: ACTIVE | Noted: 2023-12-05

## 2023-12-05 PROCEDURE — 99204 OFFICE O/P NEW MOD 45 MIN: CPT | Performed by: PHYSICIAN ASSISTANT

## 2023-12-05 RX ORDER — BUPROPION HYDROCHLORIDE 150 MG/1
150 TABLET, EXTENDED RELEASE ORAL 2 TIMES DAILY
Qty: 60 TABLET | Refills: 1 | Status: SHIPPED | OUTPATIENT
Start: 2023-12-05

## 2023-12-05 NOTE — PROGRESS NOTES
Assessment/Plan:    Obesity, Class II, BMI 35-39.9  -Discussed options of  and the role of weight loss medications. Explained the importance of making lifestyle changes if utilizing medication to aid in weight loss  -Initial weight loss goal of 5-10% weight loss for improved health  -Screening labs and records reviewed from prior  - STOP BANG-2/8    -Patient is interested in pursuing conservative plan    Goals:  -Food log (ie.) [email or web address],[employer]-1000  -recommend avoiding skipping lunch and to make sure to have protein for dinner  - To drink at least 64oz of water daily. No sugary beverages.  -to continue with walking    Discussed medication options. Not interested in injectable medications. Was on topamax prior and did not help with weight loss. To start on wellbutrin/naltrexone to mimic contrave. To start wellbutrin initially and then start on 1/2 tablet of naltrexone 2 weeks after. Discussed possible side effects. Reviewed the potential side effects of Contrave, which include: abdominal upset, headache, dizziness, trouble sleeping, increased blood pressure, depression/anxiety. Patient should call/return if he/she develops symptoms of depression/anxiety. Medication agreement signed. Plan on getting sterilization in the future. Initial Weight:190.2  Goal Weight:140lb       Bipolar disorder in full remission [facility])  Not on medication. States she was diagnosed when she was 10 and never on medication . No mood concerns      Follow up in approximately  4 week nurse visit and 4 months   with Non-Surgical Physician/Advanced Practitioner.   I have spent a total time of 45 minutes on 12/05/23 in caring for this patient including Diagnostic results, Prognosis, Risks and benefits of tx options, Instructions for management, Patient and family education, Importance of tx compliance, Risk factor reductions, Impressions, Counseling / Coordination of care, Documenting in the medical record, Reviewing / ordering tests, medicine, procedures  , and Obtaining or reviewing history  . Diagnoses and all orders for this visit:    Obesity, Class II, BMI 35-39.9  -     buPROPion (Wellbutrin SR) 150 mg 12 hr tablet; Take 1 tablet (150 mg total) by mouth 2 (two) times a day    Bipolar disorder in full remission [facility])          Subjective:   Chief Complaint   Patient presents with    Consult     MWM Consult;Gw-140lb ; Waist-36.5in ; Stop bang-2/8       Patient ID: Fernando Maria  is a 21 y.o. female with excess weight/obesity here to pursue weight management. Past Medical History:   Diagnosis Date    HIV (human immunodeficiency virus infection) ([address] [facility])        HPI: Here for MWM consult  She gained weight with pregnancy and has not been able to lose weight since. She has tried to lose weight on her own. She was seeing dietician also    She was rx topiramate also but it did not help. She was up to 100mg with it. She was seen for WM group therapy but she does not like      Obesity/Excess Weight:  Severity:  class II w/o comorbidities  Onset:  about 2 years    Modifiers: Physician Supervised Weight Loss Program and Prescription Weight Loss Medications, diet and exercise  Contributing factors: Pregnancy  Associated symptoms: clothes do not fit      Hydration:80 oz water, sometimes OJ with dinner  Alcohol: none  Exercise:housework, walking 35 mintues for kids to school 5 times a week  Occupation:  Sleep:  Dining out/takeout:0    Colonoscopy-Not applicable    Diet Recall:  B: cereal (corn flakes) w/ SF yogurt  S:  L:(sometimes skips): protein shake herbalife  S:crackers  w/OJ or water  D:salad w/ sometimes fish    The following portions of the patient's history were reviewed and updated as appropriate: She  has a past medical history of HIV (human immunodeficiency virus infection) ([address]).   She   Patient Active Problem List    Diagnosis Date Noted    Obesity, Class II, BMI 35-39.9 12/05/2023    Back pain affecting pregnancy in third trimester 2022    Thrombocytopenia affecting pregnancy ([address]) 2022    28 weeks gestation of pregnancy 2022    Bronchitis 2022    GBS bacteriuria 2022    Trichomonal vaginitis during pregnancy 2022    Maternal HIV infection during pregnancy 2022    History of alcohol abuse 2022    History of 2  sections 2022    Nausea/vomiting in pregnancy 2022    Chlamydia infection affecting pregnancy 2022    Bipolar disorder in full remission ([address] ) 2022    History of toxoplasmosis 2022    Family history of congenital anomaly 2022    History of pre-eclampsia 2022    Trauma during pregnancy 2022     She  has a past surgical history that includes  section and Cholecystectomy. Her family history is not on file. She  reports that she has never smoked. She has never used smokeless tobacco. She reports that she does not currently use alcohol. She reports that she does not currently use drugs after having used the following drugs: Marijuana. Current Outpatient Medications   Medication Sig Dispense Refill    buPROPion (Wellbutrin SR) 150 mg 12 hr tablet Take 1 tablet (150 mg total) by mouth 2 (two) times a day 60 tablet 1     No current facility-administered medications for this visit.      Current Outpatient Medications on File Prior to Visit   Medication Sig    [DISCONTINUED] acetaminophen (TYLENOL) 325 mg tablet Take 2 tablets (650 mg total) by mouth every 6 (six) hours as needed for mild pain (Patient not taking: Reported on 2023)    [DISCONTINUED] bictegravir-emtricitab-tenofovir alafenamide (Biktarvy) -25 MG tablet Take 1 tablet by mouth daily (Patient not taking: Reported on 2023)    [DISCONTINUED] fluticasone (FLONASE) 50 mcg/act nasal spray 1 spray into each nostril daily (Patient not taking: Reported on 2023)    [DISCONTINUED] hyoscyamine (ANASPAZ,LEVSIN) 0.125 MG tablet Take 1 tablet (0.125 mg total) by mouth every 4 (four) hours as needed for cramping (Patient not taking: Reported on 5/28/2021)    [DISCONTINUED] meclizine (ANTIVERT) 25 mg tablet Take 1 tablet (25 mg total) by mouth 3 (three) times a day as needed for dizziness (Patient not taking: Reported on 12/5/2023)    [DISCONTINUED] naproxen (Naprosyn) 500 mg tablet Take 1 tablet (500 mg total) by mouth 2 (two) times a day with meals (Patient not taking: Reported on 12/5/2023)    [DISCONTINUED] ondansetron (ZOFRAN-ODT) 4 mg disintegrating tablet Take 1 tablet (4 mg total) by mouth every 8 (eight) hours as needed for nausea or vomiting (Patient not taking: Reported on 12/5/2023)     No current facility-administered medications on file prior to visit. She has No Known Allergies. .    Review of Systems   Constitutional:  Negative for fever. Respiratory:  Negative for shortness of breath. Cardiovascular:  Negative for chest pain and palpitations. Gastrointestinal:  Positive for constipation (sometimes). Negative for abdominal pain, diarrhea and vomiting. Genitourinary:  Negative for difficulty urinating. Musculoskeletal:  Negative for arthralgias and back pain. Skin:  Negative for rash. Neurological:  Negative for headaches. Psychiatric/Behavioral:  Negative for dysphoric mood. The patient is not nervous/anxious. Objective:    /79   Pulse 97   Resp 16   Ht 5' 1" (1.549 m)   Wt 86.3 kg (190 lb 3.2 oz)   LMP 11/30/2023   BMI 35.94 kg/m²     Physical Exam  Vitals and nursing note reviewed. Constitutional:       General: She is not in acute distress. Appearance: She is well-developed. She is obese. HENT:      Head: Normocephalic and atraumatic. Eyes:      Conjunctiva/sclera: Conjunctivae normal.   Neck:      Thyroid: No thyromegaly. Pulmonary:      Effort: Pulmonary effort is normal. No respiratory distress. Skin:     Findings: No rash (visible).    Neurological:      Mental Status: She is alert and oriented to person, place, and time.    Psychiatric:         Mood and Affect: Mood normal.         Behavior: Behavior normal.

## 2023-12-05 NOTE — ASSESSMENT & PLAN NOTE
Not on medication. States she was diagnosed when she was 10 and never on medication .  No mood concerns

## 2023-12-05 NOTE — PATIENT INSTRUCTIONS
Control del peso   LO QUE NECESITA SABER:   ¿Por qué es importante controlar mi peso corporal? Tener sobrepeso aumenta newell riesgo de presentar problemas de estrella lan enfermedades del corazón, hipertensión, diabetes tipo 2 y ciertos tipos de cáncer. También puede aumentar newell riesgo de presentar osteoartritis, apnea del sueño y otros problemas respiratorios. Trate de bajar de peso de forma gradual y [place]. Incluso aysha mínima pérdida de peso puede disminuir newell riesgo de problemas de [place]. ¿Cuáles son los riesgos del sobrepeso? El exceso de peso puede causar muchos problemas de [place], entre ellos los siguientes:  Diabetes (nivel alto de azúcar en la emil)    Presión arterial adelina o colesterol alto    Enfermedad cardíaca    Derrame cerebral    Enfermedades del hígado o de la vesícula biliar    Cáncer de colon, de pecho, de próstata, de hígado o de riñón    La apnea del sueño    Artritis o gota    ¿Qué necesito saber acerca de las pruebas de detección? Las pruebas de detección se realizan para comprobar la existencia de enfermedades antes de que se presenten signos o síntomas. Si tiene entre 28 y 79 años, puede comprobar newell nivel de azúcar en la emil cada 3 años para detectar signos de prediabetes o diabetes. Newell médico tomará newell presión arterial en cada consulta. La presión arterial adelina puede conducir a un derrame cerebral o a otros problemas. Newell médico puede comprobar si hay signos de enfermedad cardíaca, cáncer u otros problemas de estrella. ¿Cómo puedo bajar de peso de [place] forma garcia? Aysha forma garcia y saludable para perder peso es consumir menos calorías y realizar aysha actividad física en forma regular. Usted puede perder hasta 1 bethanie por semana al reducir el consumo de 500 calorías cada día. Usted puede reducir el consumo de calorías al comer porciones más pequeñas o eliminar los alimentos con alto contenido de calorías.  Zuleima las etiquetas para determinar la cantidad de calorías que [employer] alimentos que consume. También puede quemar calorías al realizar ejercicio lan caminar, nadar o montar en bicicleta. Es más probable que usted [employer] peso si hace de estos cambios parte de newell estilo de neeru. Realice aysha actividad física por lo menos 30 minutos al día, la mayoría de los días de la [place]. Usted también puede realizar más actividad física usando las escaleras en vez de los ascensores o estacionarse más lejos cuando [place] a las tiendas. Pregunte a newell médico acerca del mejor plan de ejercicio para usted. ¿Cuál es un plan de alimentación qué me puede ayudar a controlar mi peso? Un plan de alimentación saludable incluye aysha variedad de alimentos, contiene más pocas calorías y lo [place] a estar saludable. Un plan de alimentación saludable incluye lo siguiente:     Consuma alimentos de grano integral con más frecuencia. Un plan de alimentación saludable debe contener alimentos con fibra. La fibra es la parte de las frutas, verduras y granos que newell cuerpo no puede digerir. Los alimentos de granos integrales son saludables y suministran fibra adicional a newell [place] Joseline. Algunos ejemplos de alimentos de granos integrales son los panes integrales, pastas integrales, la lenard, el arroz integral y tamiko de bulgur. Consuma aysha variedad de verduras todos los joshua. [address] [facility], coliflor, col [place] y [place]. Coma verduras anaranjadas lan las zanahorias, junior dulces y calabaza de invierno. Consuma aysha variedad de frutas todos los [address]. Escoja frutas frescas o enlatadas en newell propio jugo o con jugo bajo en [place] en vez de jugo. [place] de frutas tiene [address] o dannielle nada de [place]. Consuma productos lácteos con bajo contenido de [place]. 100 [place],9D de 1%. Consuma yogur descremado y requesón (cott[place]) semidescremado. Trate de consumir quesos descremados lan el queso mozzarela y otros quesos semidescremado.     Taffy Nissen y otros alimentos con proteínas bajos en grasa. Escoja frijoles u [address] Seleccione pescado, carne de aves sin piel (lan el erik o [place]), marie de carne [place] (de res o de cerdo). Antes de cocinar las mirella o las aves marisela cualquier parte de grasa visible. Use menos grasas y aceites. Trate de hornear los alimentos en lugar de freírlos. Agregue a las [address], lan [place], crema [place], condimentos para [place] y [place]. Consuma menos alimentos de alto tenor graso. Coma menos alimentos altos en grasa lan las junior fritas, donas, helados y pasteles. Consuma menos dulces. Limite los alimentos y las bebidas con un gran contenido de azúcar. Estos incluyen los caramelos, galletas, gaseosas normales y bebidas endulzadas. ¿Cuáles son las otras formas en que puedo disminuir las calorías? Reduzca el tamaño de las porciones. Use platos pequeños para servirse porciones pequeñas. No coma segundas porciones. Cuando coma en un restaurante, pida aysha [place] Riling y guarde en titi la mitad de la comida antes de empezar a comer. Comparta con alguien un plato de entrada. Reemplace los bocadillos o meriendas altos en calorías por los saludables de menos calorías. Escoja frutas frescas, verduras, galletas de arroz bajo en grasa o palomitas de maíz en lugar de comer junior fritas de paquete, nueces o dulces de chocolate. [place] agua o bebidas dietéticas en lugar de las endulzadas. No vaya al lopez cuando tenga hambre. Usted podría ser más propenso a elegir alimentos no saludables. Lleve aysha lista de alimentos saludables y vaya de compras después de khloe comido. Coma deborah comidas regularmente. No se salte ninguna comida. No omita ninguna comida porque esto puede conducir a comer más a aysha hora más tarde del día. [place] podría traerle problemas para perder peso. Si no tiene tiempo para hacer comidas regulares, consuma un refrigerio saludable.  Hable con un dietista para que lo ayude a crear un plan de comidas y un horario que carolee adecuados para usted. ¿Qué más debería tener en cuenta mientras trato de bajar de peso? Esté consciente de las situaciones que podrían ocasionarle ganas de comer en exceso, lan el comer mientras dima la televisión. Busque formas para evitar estas situaciones. Por ejemplo, leer un libro, caminar o hacer trabajos manuales. Reúnase con un kerry de apoyo o con personas que también están tratando de bajar de [place]. [place] le puede ayudar a mantenerse motivado y continuar progresando en newell objetivo de perder peso. ACUERDOS SOBRE NEWELL CUIDADO:   Usted tiene el derecho de ayudar a planear newell cuidado. Aprenda todo lo que pueda sobre newell condición y lan darle tratamiento. Discuta deborah opciones de tratamiento con deborah médicos para decidir el cuidado que usted desea recibir. Usted siempre tiene el derecho de rechazar el tratamiento. Esta información es sólo para uso en educación. Newell intención no es darle un consejo médico sobre enfermedades o tratamientos. Colsulte con newell Ingram Alexis farmacéutico antes de seguir cualquier régimen médico para saber si es seguro y efectivo para usted. © Copyright [employer] 2023 Information is for End User's use only and may not be sold, redistributed or otherwise used for commercial purposes.

## 2023-12-05 NOTE — ASSESSMENT & PLAN NOTE
-Discussed options of  and the role of weight loss medications. Explained the importance of making lifestyle changes if utilizing medication to aid in weight loss  -Initial weight loss goal of 5-10% weight loss for improved health  -Screening labs and records reviewed from prior  - STOP BANG-2/8    -Patient is interested in pursuing conservative plan    Goals:  -Food log (ie.) www.[employer].com,[employer] Â®,[employer]-1000  -recommend avoiding skipping lunch and to make sure to have protein for dinner  - To drink at least 64oz of water daily. No sugary beverages.  -to continue with walking    Discussed medication options. Not interested in injectable medications. Was on topamax prior and did not help with weight loss. To start on wellbutrin/naltrexone to mimic contrave. To start wellbutrin initially and then start on 1/2 tablet of naltrexone 2 weeks after. Discussed possible side effects. Reviewed the potential side effects of Contrave, which include: abdominal upset, headache, dizziness, trouble sleeping, increased blood pressure, depression/anxiety. Patient should call/return if he/she develops symptoms of depression/anxiety. Medication agreement signed. Plan on getting sterilization in the future.        Initial Weight:190.2  Goal Weight:140lb

## 2023-12-22 ENCOUNTER — PATIENT OUTREACH (OUTPATIENT)
Dept: SURGERY | Facility: CLINIC | Age: 23
End: 2023-12-22

## 2023-12-22 NOTE — PROGRESS NOTES
HC completed January check requisition and signed. HC sent to  [employer] for processing and accounts payable.

## 2023-12-27 DIAGNOSIS — E66.9 OBESITY, CLASS II, BMI 35-39.9: ICD-10-CM

## 2023-12-27 RX ORDER — BUPROPION HYDROCHLORIDE 150 MG/1
150 TABLET, EXTENDED RELEASE ORAL 2 TIMES DAILY
Qty: 180 TABLET | Refills: 1 | Status: SHIPPED | OUTPATIENT
Start: 2023-12-27 | End: 2024-01-02

## 2024-01-02 ENCOUNTER — CLINICAL SUPPORT (OUTPATIENT)
Dept: BARIATRICS | Facility: CLINIC | Age: 24
End: 2024-01-02

## 2024-01-02 ENCOUNTER — TELEPHONE (OUTPATIENT)
Dept: BARIATRICS | Facility: CLINIC | Age: 24
End: 2024-01-02

## 2024-01-02 VITALS
BODY MASS INDEX: 34.74 KG/M2 | SYSTOLIC BLOOD PRESSURE: 110 MMHG | RESPIRATION RATE: 16 BRPM | HEART RATE: 93 BPM | HEIGHT: 61 IN | DIASTOLIC BLOOD PRESSURE: 70 MMHG | WEIGHT: 184 LBS

## 2024-01-02 DIAGNOSIS — E66.9 OBESITY, CLASS II, BMI 35-39.9: Primary | ICD-10-CM

## 2024-01-02 DIAGNOSIS — R63.5 ABNORMAL WEIGHT GAIN: Primary | ICD-10-CM

## 2024-01-02 PROCEDURE — RECHECK

## 2024-01-02 RX ORDER — TOPIRAMATE 100 MG/1
TABLET, FILM COATED ORAL
COMMUNITY
Start: 2023-11-29

## 2024-01-02 RX ORDER — IBUPROFEN 600 MG/1
TABLET ORAL
COMMUNITY
Start: 2023-11-29

## 2024-01-02 RX ORDER — METFORMIN HYDROCHLORIDE 750 MG/1
TABLET, EXTENDED RELEASE ORAL
Qty: 60 TABLET | Refills: 2 | Status: SHIPPED | OUTPATIENT
Start: 2024-01-02

## 2024-01-02 NOTE — TELEPHONE ENCOUNTER
I sent it in. The most common side effect is loose stool.  She should take 1 tablet with a meal the fist week and if doing well then increase to 2 tablets.

## 2024-01-02 NOTE — TELEPHONE ENCOUNTER
Patient came in for a nurse visit today. Patient stating she is unhappy with the slow weigh loss progress. Patient stating that she is having insomnia after she takes her wellbutrin at night , she states that she wont be able to fall asleep til about 5-6 in the morning.She ws informed by her psychiatrist that it may be the caffeine in the wellbutrin. Patient also mention that she would like to try something else that will make her loss and work more with her metabolisms. Pls advice

## 2024-01-02 NOTE — TELEPHONE ENCOUNTER
6 pounds in a month is actually good weight loss progress. 1/2-2 lb a week is good progress. The only other options would be metformin or the injectable medications. She would likely get better results from the injections.   I know she said she wasn't interested in them prior. She would also have to wait to start on them due to supply.  If she does want to try metformin or get on a wait list for wegovy I can send it in    She can stop the wellbutrin also.  I would start by taking it only in the morning for the next 3 days and stop it completely

## 2024-01-02 NOTE — PROGRESS NOTES
Patient last visit weight:  Patient current visit weight:    If you are taking phentermine or other oral weight loss medications, are you experiencing any of the following symptoms:  Headache:NO   Blurred Vision: NO  Chest Pain: NO  Palpitations:NO  Insomnia: YES  SPECIFY ORAL MEDICATION AND DOSAGE: Wellbutrin and topiramate    If you are taking an injectable medication,  are you experiencing any of the following symptoms:  Bloating:   Nausea:  Vomiting:   Constipation:   Diarrhea:  SPECIFY INJECTABLE MEDICATION AND CURRENT DOSAGE:      Vitals:    Is BP less than 100/60?NO  Is BP greater than 140/90?NO  Is HR greater than 100?NO  **If yes to any of the above, have patient relax and repeat in 5-10 minutes**    Repeat values:    Is BP less than 100/60?  Is BP greater than 140/90?  Is HR greater than 100?  **If values remain outside of ranges above, please consult provider for next steps**

## 2024-01-11 ENCOUNTER — TELEPHONE (OUTPATIENT)
Dept: OBGYN CLINIC | Facility: CLINIC | Age: 24
End: 2024-01-11

## 2024-01-11 ENCOUNTER — OFFICE VISIT (OUTPATIENT)
Dept: OBGYN CLINIC | Facility: CLINIC | Age: 24
End: 2024-01-11

## 2024-01-11 VITALS
WEIGHT: 182.2 LBS | DIASTOLIC BLOOD PRESSURE: 68 MMHG | SYSTOLIC BLOOD PRESSURE: 100 MMHG | BODY MASS INDEX: 34.43 KG/M2 | HEART RATE: 81 BPM

## 2024-01-11 DIAGNOSIS — Z30.09 ENCOUNTER FOR COUNSELING REGARDING CONTRACEPTION: Primary | ICD-10-CM

## 2024-01-11 PROCEDURE — 99203 OFFICE O/P NEW LOW 30 MIN: CPT | Performed by: OBSTETRICS & GYNECOLOGY

## 2024-01-11 NOTE — PROGRESS NOTES
Subjective:     Anatoliy Bailey is a 23 y.o.  female who presents for discussion of contraception. Patient has three children and does not desire future pregnancy. She desires a long acting form of contraception and eventually would like to discuss permanent contraception. She denies history of chlamydia or gonorrhea, and denies multiple sexual partners.     Obstetric history is significant for 3 prior  sections at term and two spontaneous abortions.    Objective:    Vitals: Blood pressure 100/68, pulse 81, weight 82.6 kg (182 lb 3.2 oz), last menstrual period 2023, not currently breastfeeding.Body mass index is 34.43 kg/m².    Physical Exam  Constitutional:       General: She is not in acute distress.     Appearance: Normal appearance. She is not ill-appearing.   HENT:      Head: Normocephalic and atraumatic.   Cardiovascular:      Rate and Rhythm: Normal rate.   Skin:     General: Skin is warm and dry.   Neurological:      General: No focal deficit present.      Mental Status: She is alert and oriented to person, place, and time.         Assessment/Plan:  Patient has expressed desire for contraception.  We have discussed all methods of contraception including OCPs, Nuva Ring, Ortho Evra as combined contraceptives.  I have discussed that these methods include estrogens which can increase the risk of DVT/PE/Stroke, though this risk is much lower than the risk of this morbidity with pregnancy. I have discussed the small side effects that some patients experience including breast tenderness, bloating, mood changes, headaches.  The benefits of these contraceptives include shorter, lighter menstruation, less dysmenorrhea, acne reduction, potential decreased risk of ovarian cancer and ability to manipulate menses.  In addition, we have discussed progestin-only contraceptives including progestin only pills, depo provera, implant, LNG-IUD. The patient understands that she may experience irregular  bleeding with each of these methods. Specifically with systemic progestins, she may experience an increase in appetite and potential weight gain, as well as  slower return of fertility after discontinuation. With the long-acting reversible options, there is the additional risk of placement, migration and difficulty with removal.  We have also discussed the paraguard IUD, which is a nonhormonal option. The risks of this include risks of placement and migration.  Finally, we have discussed abstinence, rhythm method, condoms, spermicides, diaphragms, etc. The patient understands that none of the methods discussed are 100% effective except for abstinence. We discussed each method's failure rates and perfect vs ideal use. We have also discussed emergency contraception and how to obtain Plan B, Drea or a paraguard IUD should unprotected sex occur.    Patient interested in Mirena IUD.    Will call once it arrives to the office.         Yaa Barry MD  1/11/2024  8:07 AM

## 2024-01-12 ENCOUNTER — PATIENT OUTREACH (OUTPATIENT)
Dept: SURGERY | Facility: CLINIC | Age: 24
End: 2024-01-12

## 2024-01-15 ENCOUNTER — PATIENT OUTREACH (OUTPATIENT)
Dept: SURGERY | Facility: CLINIC | Age: 24
End: 2024-01-15

## 2024-01-17 ENCOUNTER — PATIENT OUTREACH (OUTPATIENT)
Dept: SURGERY | Facility: CLINIC | Age: 24
End: 2024-01-17

## 2024-01-17 NOTE — PROGRESS NOTES
HC contacted maryann for follow up, Maryann let HC know that Ct unit was having a couple of issues but, that the issues have been resolved. Maryann notified HC that he will be raising the rent and requested for steps on how to proceed. HC let maryann know that there is no issues, [facility] and HC will prepare a rent difference allocation for HS to approve. HC let Maryann know that once received, HC will send for approval.

## 2024-01-18 ENCOUNTER — PATIENT OUTREACH (OUTPATIENT)
Dept: SURGERY | Facility: CLINIC | Age: 24
End: 2024-01-18

## 2024-01-18 NOTE — PROGRESS NOTES
HC discussed case with HS, HS let HC know that [facility] needs to provide 30 days notice for rent increases and Ct must be aware that the rent is going up.

## 2024-01-22 ENCOUNTER — PATIENT OUTREACH (OUTPATIENT)
Dept: SURGERY | Facility: CLINIC | Age: 24
End: 2024-01-22

## 2024-01-26 ENCOUNTER — TELEPHONE (OUTPATIENT)
Dept: OBGYN CLINIC | Facility: CLINIC | Age: 24
End: 2024-01-26

## 2024-01-27 DIAGNOSIS — E66.9 OBESITY, CLASS II, BMI 35-39.9: ICD-10-CM

## 2024-01-29 RX ORDER — METFORMIN HYDROCHLORIDE 750 MG/1
TABLET, EXTENDED RELEASE ORAL
Qty: 180 TABLET | Refills: 0 | Status: SHIPPED | OUTPATIENT
Start: 2024-01-29

## 2024-02-01 ENCOUNTER — PATIENT OUTREACH (OUTPATIENT)
Dept: SURGERY | Facility: CLINIC | Age: 24
End: 2024-02-01

## 2024-02-09 ENCOUNTER — PATIENT OUTREACH (OUTPATIENT)
Dept: SURGERY | Facility: CLINIC | Age: 24
End: 2024-02-09

## 2024-02-09 NOTE — PROGRESS NOTES
HC completed March check requisition and signed. HC sent to  [employer] for processing and accounts payable.

## 2024-02-20 ENCOUNTER — HOSPITAL ENCOUNTER (EMERGENCY)
Facility: HOSPITAL | Age: 24
Discharge: HOME | End: 2024-02-20
Attending: EMERGENCY MEDICINE
Payer: MEDICARE

## 2024-02-20 VITALS
HEART RATE: 73 BPM | OXYGEN SATURATION: 100 % | BODY MASS INDEX: 30.83 KG/M2 | SYSTOLIC BLOOD PRESSURE: 95 MMHG | WEIGHT: 163.14 LBS | RESPIRATION RATE: 16 BRPM | DIASTOLIC BLOOD PRESSURE: 55 MMHG | TEMPERATURE: 99.2 F

## 2024-02-20 DIAGNOSIS — R10.9 ABDOMINAL PAIN: Primary | ICD-10-CM

## 2024-02-20 DIAGNOSIS — R19.7 DIARRHEA: ICD-10-CM

## 2024-02-20 DIAGNOSIS — R11.2 NAUSEA AND VOMITING: ICD-10-CM

## 2024-02-20 LAB
ALBUMIN SERPL BCP-MCNC: 4.8 G/DL (ref 3.5–5)
ALP SERPL-CCNC: 70 U/L (ref 34–104)
ALT SERPL W P-5'-P-CCNC: 51 U/L (ref 7–52)
AMORPH URATE CRY URNS QL MICRO: ABNORMAL
ANION GAP SERPL CALCULATED.3IONS-SCNC: 8 MMOL/L
AST SERPL W P-5'-P-CCNC: 32 U/L (ref 13–39)
BACTERIA UR QL AUTO: ABNORMAL /HPF
BASOPHILS # BLD AUTO: 0.03 THOUSANDS/ÂΜL (ref 0–0.1)
BASOPHILS NFR BLD AUTO: 0 % (ref 0–1)
BILIRUB SERPL-MCNC: 0.61 MG/DL (ref 0.2–1)
BILIRUB UR QL STRIP: ABNORMAL
BUN SERPL-MCNC: 12 MG/DL (ref 5–25)
CALCIUM SERPL-MCNC: 9.9 MG/DL (ref 8.4–10.2)
CHLORIDE SERPL-SCNC: 106 MMOL/L (ref 96–108)
CLARITY UR: ABNORMAL
CO2 SERPL-SCNC: 22 MMOL/L (ref 21–32)
COLOR UR: YELLOW
CREAT SERPL-MCNC: 0.9 MG/DL (ref 0.6–1.3)
EOSINOPHIL # BLD AUTO: 0.13 THOUSAND/ÂΜL (ref 0–0.61)
EOSINOPHIL NFR BLD AUTO: 2 % (ref 0–6)
ERYTHROCYTE [DISTWIDTH] IN BLOOD BY AUTOMATED COUNT: 13.5 % (ref 11.6–15.1)
EXT PREGNANCY TEST URINE: NEGATIVE
EXT. CONTROL: NORMAL
GFR SERPL CREATININE-BSD FRML MDRD: 89 ML/MIN/1.73SQ M
GLUCOSE SERPL-MCNC: 84 MG/DL (ref 65–140)
GLUCOSE UR STRIP-MCNC: NEGATIVE MG/DL
HCT VFR BLD AUTO: 42.8 % (ref 34.8–46.1)
HGB BLD-MCNC: 14.3 G/DL (ref 11.5–15.4)
HGB UR QL STRIP.AUTO: ABNORMAL
IMM GRANULOCYTES # BLD AUTO: 0.01 THOUSAND/UL (ref 0–0.2)
IMM GRANULOCYTES NFR BLD AUTO: 0 % (ref 0–2)
KETONES UR STRIP-MCNC: ABNORMAL MG/DL
LEUKOCYTE ESTERASE UR QL STRIP: NEGATIVE
LIPASE SERPL-CCNC: 27 U/L (ref 11–82)
LYMPHOCYTES # BLD AUTO: 4.02 THOUSANDS/ÂΜL (ref 0.6–4.47)
LYMPHOCYTES NFR BLD AUTO: 47 % (ref 14–44)
MCH RBC QN AUTO: 27.4 PG (ref 26.8–34.3)
MCHC RBC AUTO-ENTMCNC: 33.4 G/DL (ref 31.4–37.4)
MCV RBC AUTO: 82 FL (ref 82–98)
MONOCYTES # BLD AUTO: 0.47 THOUSAND/ÂΜL (ref 0.17–1.22)
MONOCYTES NFR BLD AUTO: 5 % (ref 4–12)
MUCOUS THREADS UR QL AUTO: ABNORMAL
NEUTROPHILS # BLD AUTO: 3.97 THOUSANDS/ÂΜL (ref 1.85–7.62)
NEUTS SEG NFR BLD AUTO: 46 % (ref 43–75)
NITRITE UR QL STRIP: NEGATIVE
NON-SQ EPI CELLS URNS QL MICRO: ABNORMAL /HPF
NRBC BLD AUTO-RTO: 0 /100 WBCS
PH UR STRIP.AUTO: 8.5 [PH] (ref 4.5–8)
PLATELET # BLD AUTO: 264 THOUSANDS/UL (ref 149–390)
PMV BLD AUTO: 11 FL (ref 8.9–12.7)
POTASSIUM SERPL-SCNC: 3.5 MMOL/L (ref 3.5–5.3)
PROT SERPL-MCNC: 8.2 G/DL (ref 6.4–8.4)
PROT UR STRIP-MCNC: ABNORMAL MG/DL
RBC # BLD AUTO: 5.22 MILLION/UL (ref 3.81–5.12)
RBC #/AREA URNS AUTO: ABNORMAL /HPF
SODIUM SERPL-SCNC: 136 MMOL/L (ref 135–147)
SP GR UR STRIP.AUTO: 1.01 (ref 1–1.03)
UROBILINOGEN UR QL STRIP.AUTO: 0.2 E.U./DL
WBC # BLD AUTO: 8.63 THOUSAND/UL (ref 4.31–10.16)
WBC #/AREA URNS AUTO: ABNORMAL /HPF

## 2024-02-20 PROCEDURE — 99284 EMERGENCY DEPT VISIT MOD MDM: CPT

## 2024-02-20 PROCEDURE — 36415 COLL VENOUS BLD VENIPUNCTURE: CPT | Performed by: EMERGENCY MEDICINE

## 2024-02-20 PROCEDURE — 81001 URINALYSIS AUTO W/SCOPE: CPT

## 2024-02-20 PROCEDURE — 83690 ASSAY OF LIPASE: CPT | Performed by: EMERGENCY MEDICINE

## 2024-02-20 PROCEDURE — 96374 THER/PROPH/DIAG INJ IV PUSH: CPT

## 2024-02-20 PROCEDURE — 99284 EMERGENCY DEPT VISIT MOD MDM: CPT | Performed by: EMERGENCY MEDICINE

## 2024-02-20 PROCEDURE — 85025 COMPLETE CBC W/AUTO DIFF WBC: CPT | Performed by: EMERGENCY MEDICINE

## 2024-02-20 PROCEDURE — 81025 URINE PREGNANCY TEST: CPT | Performed by: EMERGENCY MEDICINE

## 2024-02-20 PROCEDURE — 80053 COMPREHEN METABOLIC PANEL: CPT | Performed by: EMERGENCY MEDICINE

## 2024-02-20 PROCEDURE — 96375 TX/PRO/DX INJ NEW DRUG ADDON: CPT

## 2024-02-20 PROCEDURE — 96361 HYDRATE IV INFUSION ADD-ON: CPT

## 2024-02-20 RX ORDER — SUCRALFATE 1 G/1
1 TABLET ORAL 4 TIMES DAILY
Qty: 20 TABLET | Refills: 0 | Status: SHIPPED | OUTPATIENT
Start: 2024-02-20

## 2024-02-20 RX ORDER — ONDANSETRON 4 MG/1
4 TABLET, ORALLY DISINTEGRATING ORAL EVERY 6 HOURS PRN
Qty: 20 TABLET | Refills: 0 | Status: SHIPPED | OUTPATIENT
Start: 2024-02-20

## 2024-02-20 RX ORDER — KETOROLAC TROMETHAMINE 30 MG/ML
15 INJECTION, SOLUTION INTRAMUSCULAR; INTRAVENOUS ONCE
Status: COMPLETED | OUTPATIENT
Start: 2024-02-20 | End: 2024-02-20

## 2024-02-20 RX ORDER — ONDANSETRON 2 MG/ML
4 INJECTION INTRAMUSCULAR; INTRAVENOUS ONCE
Status: COMPLETED | OUTPATIENT
Start: 2024-02-20 | End: 2024-02-20

## 2024-02-20 RX ADMIN — ONDANSETRON 4 MG: 2 INJECTION INTRAMUSCULAR; INTRAVENOUS at 17:54

## 2024-02-20 RX ADMIN — KETOROLAC TROMETHAMINE 15 MG: 30 INJECTION, SOLUTION INTRAMUSCULAR; INTRAVENOUS at 17:54

## 2024-02-20 RX ADMIN — SODIUM CHLORIDE 1000 ML: 0.9 INJECTION, SOLUTION INTRAVENOUS at 17:54

## 2024-02-20 NOTE — ED PROVIDER NOTES
History  Chief Complaint   Patient presents with    Abdominal Pain     Pt reports N/V/D.  Points to upper abd indicating area of pain. Recently stopped eating meat     24 y.o. F w/h/o cholecystectomy, , HIV p/w N/V/D x last night.  Associated with upper abd pain.      History provided by:  Patient   used: No    Abdominal Pain  Associated symptoms: diarrhea, nausea and vomiting    Associated symptoms: no chills, no cough, no fever and no sore throat        Prior to Admission Medications   Prescriptions Last Dose Informant Patient Reported? Taking?   ibuprofen (MOTRIN) 600 mg tablet   Yes No   metFORMIN (GLUCOPHAGE-XR) 750 mg 24 hr tablet   No No   Sig: TAKE 2 TABLETS WITH A MEAL   topiramate (TOPAMAX) 100 mg tablet   Yes No      Facility-Administered Medications: None       Past Medical History:   Diagnosis Date    HIV (human immunodeficiency virus infection) (HCC)        Past Surgical History:   Procedure Laterality Date     SECTION      CHOLECYSTECTOMY         History reviewed. No pertinent family history.  I have reviewed and agree with the history as documented.    E-Cigarette/Vaping    E-Cigarette Use Never User      E-Cigarette/Vaping Substances    Nicotine No     THC No     CBD No     Flavoring No     Other No     Unknown No      Social History     Tobacco Use    Smoking status: Never    Smokeless tobacco: Never   Vaping Use    Vaping status: Never Used   Substance Use Topics    Alcohol use: Not Currently    Drug use: Not Currently     Types: Marijuana       Review of Systems   Constitutional:  Negative for chills and fever.   HENT:  Negative for rhinorrhea and sore throat.    Respiratory:  Negative for cough.    Gastrointestinal:  Positive for abdominal pain, diarrhea, nausea and vomiting.   Musculoskeletal:  Negative for myalgias.   Neurological:  Negative for headaches.       Physical Exam  Physical Exam  Vitals and nursing note reviewed.   Constitutional:       General:  She is not in acute distress.     Appearance: Normal appearance. She is well-developed. She is not ill-appearing, toxic-appearing or diaphoretic.   HENT:      Head: Normocephalic and atraumatic.   Eyes:      General: No scleral icterus.  Neck:      Vascular: No JVD.   Cardiovascular:      Rate and Rhythm: Normal rate and regular rhythm.      Heart sounds: Normal heart sounds. No murmur heard.  Pulmonary:      Effort: Pulmonary effort is normal. No accessory muscle usage or respiratory distress.      Breath sounds: Normal breath sounds. No stridor. No wheezing, rhonchi or rales.   Abdominal:      General: There is no distension.      Palpations: Abdomen is soft. Abdomen is not rigid. There is no mass.      Tenderness: There is abdominal tenderness in the epigastric area. There is no guarding or rebound. Negative signs include Nunes's sign and McBurney's sign.   Skin:     General: Skin is warm and dry.      Coloration: Skin is not jaundiced or pale.      Findings: No rash.   Neurological:      Mental Status: She is alert.      GCS: GCS eye subscore is 4. GCS verbal subscore is 5. GCS motor subscore is 6.         Vital Signs  ED Triage Vitals   Temperature Pulse Respirations Blood Pressure SpO2   02/20/24 1645 02/20/24 1644 02/20/24 1644 02/20/24 1644 02/20/24 1644   99.2 °F (37.3 °C) 89 18 119/66 100 %      Temp Source Heart Rate Source Patient Position - Orthostatic VS BP Location FiO2 (%)   02/20/24 1645 02/20/24 1644 02/20/24 1644 02/20/24 1644 --   Oral Monitor Sitting Right arm       Pain Score       02/20/24 1754       10 - Worst Possible Pain           Vitals:    02/20/24 1644 02/20/24 1930   BP: 119/66 95/55   Pulse: 89 73   Patient Position - Orthostatic VS: Sitting Lying         Visual Acuity      ED Medications  Medications   sodium chloride 0.9 % bolus 1,000 mL (1,000 mL Intravenous New Bag 2/20/24 1754)   ketorolac (TORADOL) injection 15 mg (15 mg Intravenous Given 2/20/24 1754)   ondansetron (ZOFRAN)  injection 4 mg (4 mg Intravenous Given 2/20/24 1754)       Diagnostic Studies  Results Reviewed       Procedure Component Value Units Date/Time    Urine Microscopic [[id number]]  (Abnormal) Collected: 02/20/24 1742    Lab Status: Final result Specimen: Urine, Clean Catch Updated: 02/20/24 1847     RBC, UA 10-20 /hpf      WBC, UA 4-10 /hpf      Epithelial Cells Occasional /hpf      Bacteria, UA Moderate /hpf      MUCUS THREADS Innumerable     Amorphous Crystals, UA Occasional    CBC and differential [[id number]]  (Abnormal) Collected: 02/20/24 1752    Lab Status: Final result Specimen: Blood from Arm, Right Updated: 02/20/24 1834     WBC 8.63 Thousand/uL      RBC 5.22 Million/uL      Hemoglobin 14.3 g/dL      Hematocrit 42.8 %      MCV 82 fL      MCH 27.4 pg      MCHC 33.4 g/dL      RDW 13.5 %      MPV 11.0 fL      Platelets 264 Thousands/uL      nRBC 0 /100 WBCs      Neutrophils Relative 46 %      Immat GRANS % 0 %      Lymphocytes Relative 47 %      Monocytes Relative 5 %      Eosinophils Relative 2 %      Basophils Relative 0 %      Neutrophils Absolute 3.97 Thousands/µL      Immature Grans Absolute 0.01 Thousand/uL      Lymphocytes Absolute 4.02 Thousands/µL      Monocytes Absolute 0.47 Thousand/µL      Eosinophils Absolute 0.13 Thousand/µL      Basophils Absolute 0.03 Thousands/µL     Comprehensive metabolic panel [[id number]] Collected: 02/20/24 1752    Lab Status: Final result Specimen: Blood from Arm, Right Updated: 02/20/24 1824     Sodium 136 mmol/L      Potassium 3.5 mmol/L      Chloride 106 mmol/L      CO2 22 mmol/L      ANION GAP 8 mmol/L      BUN 12 mg/dL      Creatinine 0.90 mg/dL      Glucose 84 mg/dL      Calcium 9.9 mg/dL      AST 32 U/L      ALT 51 U/L      Alkaline Phosphatase 70 U/L      Total Protein 8.2 g/dL      Albumin 4.8 g/dL      Total Bilirubin 0.61 mg/dL      eGFR 89 ml/min/1.73sq m     Narrative:      National Kidney Disease Foundation guidelines for Chronic Kidney Disease (CKD):     Stage  1 with normal or high GFR (GFR > 90 mL/min/1.73 square meters)    Stage 2 Mild CKD (GFR = 60-89 mL/min/1.73 square meters)    Stage 3A Moderate CKD (GFR = 45-59 mL/min/1.73 square meters)    Stage 3B Moderate CKD (GFR = 30-44 mL/min/1.73 square meters)    Stage 4 Severe CKD (GFR = 15-29 mL/min/1.73 square meters)    Stage 5 End Stage CKD (GFR <15 mL/min/1.73 square meters)  Note: GFR calculation is accurate only with a steady state creatinine    Lipase [[id number]]  (Normal) Collected: 02/20/24 1752    Lab Status: Final result Specimen: Blood from Arm, Right Updated: 02/20/24 1824     Lipase 27 u/L     POCT pregnancy, urine [[id number]]  (Normal) Resulted: 02/20/24 1752    Lab Status: Final result Updated: 02/20/24 1752     EXT Preg Test, Ur Negative     Control Valid    Urine Macroscopic, POC [[id number]]  (Abnormal) Collected: 02/20/24 1742    Lab Status: Final result Specimen: Urine Updated: 02/20/24 1743     Color, UA Yellow     Clarity, UA Cloudy     pH, UA 8.5     Leukocytes, UA Negative     Nitrite, UA Negative     Protein,  (2+) mg/dl      Glucose, UA Negative mg/dl      Ketones, UA Trace mg/dl      Urobilinogen, UA 0.2 E.U./dl      Bilirubin, UA Small     Occult Blood, UA Trace     Specific Gravity, UA 1.015    Narrative:      CLINITEK RESULT                   No orders to display              Procedures  Procedures         ED Course  ED Course as of 02/20/24 2005 Tue Feb 20, 2024   1826 Comprehensive metabolic panel  Normal   1826 Lipase: 27  Normal   2004 Pt reports feeling better. Updated pt on labs.                               SBIRT 22yo+      Flowsheet Row Most Recent Value   Initial Alcohol Screen: US AUDIT-C     1. How often do you have a drink containing alcohol? 0 Filed at: 02/20/2024 1753   2. How many drinks containing alcohol do you have on a typical day you are drinking?  0 Filed at: 02/20/2024 1753   3a. Male UNDER 65: How often do you have five or more drinks on one occasion? 0 Filed  at: 02/20/2024 1753   3b. FEMALE Any Age, or MALE 65+: How often do you have 4 or more drinks on one occassion? 0 Filed at: 02/20/2024 1753   Audit-C Score 0 Filed at: 02/20/2024 1753   PUMA: How many times in the past year have you...    Used an illegal drug or used a prescription medication for non-medical reasons? Never Filed at: 02/20/2024 1753                      Medical Decision Making  N/V/D, likely viral - Will check CBC as marker of infection, CMP to r/o hepatitis/biliary disease, lipase to r/o pancreatitis, urine preg to r/o ectopic pregnancy, and treat symptoms.    Amount and/or Complexity of Data Reviewed  Labs: ordered. Decision-making details documented in ED Course.    Risk  Prescription drug management.             Disposition  Final diagnoses:   Abdominal pain   Nausea and vomiting   Diarrhea     Time reflects when diagnosis was documented in both MDM as applicable and the Disposition within this note       Time User Action Codes Description Comment    2/20/2024  8:00 PM Tracie Thrasher Add [R10.9] Abdominal pain     2/20/2024  8:00 PM Tracie Thrasher Add [R11.2] Nausea and vomiting     2/20/2024  8:00 PM Tracie Thrasher L Add [R19.7] Diarrhea           ED Disposition       ED Disposition   Discharge    Condition   Stable    Date/Time   Tue Feb 20, 2024 2002    Comment   Anatoliy Bailey discharge to home/self care.                   Follow-up Information    None         Patient's Medications   Discharge Prescriptions    ONDANSETRON (ZOFRAN ODT) 4 MG DISINTEGRATING TABLET    Take 1 tablet (4 mg total) by mouth every 6 (six) hours as needed for nausea or vomiting       Start Date: 2/20/2024 End Date: --       Order Dose: 4 mg       Quantity: 20 tablet    Refills: 0    SUCRALFATE (CARAFATE) 1 G TABLET    Take 1 tablet (1 g total) by mouth 4 (four) times a day       Start Date: 2/20/2024 End Date: --       Order Dose: 1 g       Quantity: 20 tablet    Refills: 0       No discharge procedures on  file.    PDMP Review         Value Time User    PDMP Reviewed  Yes 12/5/2023  9:05 AM Ada Champagne PA-C            ED Provider  Electronically Signed by             Tracie Thrasher DO  02/20/24 2005

## 2024-02-20 NOTE — Clinical Note
Anatoliy Baliey was seen and treated in our emergency department on 2/20/2024.                Diagnosis:     Anatoliy  may return to school on return date.    She may return on this date: 02/22/2024         If you have any questions or concerns, please don't hesitate to call.      Tracie Thrasher, DO    ______________________________           _______________          _______________  Hospital Representative                              Date                                Time

## 2024-03-07 ENCOUNTER — PATIENT OUTREACH (OUTPATIENT)
Dept: SURGERY | Facility: CLINIC | Age: 24
End: 2024-03-07

## 2024-03-07 NOTE — PROGRESS NOTES
Updated and organized physical housing file for CT with current rent payment, using housing file template with  [employer].

## 2024-03-11 ENCOUNTER — PATIENT OUTREACH (OUTPATIENT)
Dept: SURGERY | Facility: CLINIC | Age: 24
End: 2024-03-11

## 2024-03-11 NOTE — PROGRESS NOTES
HC completed April check requisition and signed. HC sent to  [employer] for processing and accounts payable.

## 2024-03-29 ENCOUNTER — PATIENT OUTREACH (OUTPATIENT)
Dept: SURGERY | Facility: CLINIC | Age: 24
End: 2024-03-29

## 2024-04-02 ENCOUNTER — PATIENT OUTREACH (OUTPATIENT)
Dept: SURGERY | Facility: CLINIC | Age: 24
End: 2024-04-02

## 2024-04-12 ENCOUNTER — PATIENT OUTREACH (OUTPATIENT)
Dept: SURGERY | Facility: CLINIC | Age: 24
End: 2024-04-12

## 2024-04-16 ENCOUNTER — PATIENT OUTREACH (OUTPATIENT)
Dept: SURGERY | Facility: CLINIC | Age: 24
End: 2024-04-16

## 2024-04-19 ENCOUNTER — PATIENT OUTREACH (OUTPATIENT)
Dept: SURGERY | Facility: CLINIC | Age: 24
End: 2024-04-19

## 2024-04-22 ENCOUNTER — PATIENT OUTREACH (OUTPATIENT)
Dept: SURGERY | Facility: CLINIC | Age: 24
End: 2024-04-22

## 2024-04-23 ENCOUNTER — PATIENT OUTREACH (OUTPATIENT)
Dept: SURGERY | Facility: CLINIC | Age: 24
End: 2024-04-23

## 2024-04-23 NOTE — PROGRESS NOTES
HC contacted Ct, Ct states she did not receive the letter yet for housing assistance. HC pushed appointment back to May 2nd at 1pm for Ct.     HC let Ct know that office has been having issues with mail, as long as ct provides all documents and attends appointment, housing assistance can be renewed. Ct confirmed and will provide documentation.

## 2024-05-01 ENCOUNTER — PATIENT OUTREACH (OUTPATIENT)
Dept: SURGERY | Facility: CLINIC | Age: 24
End: 2024-05-01

## 2024-05-02 ENCOUNTER — PATIENT OUTREACH (OUTPATIENT)
Dept: SURGERY | Facility: CLINIC | Age: 24
End: 2024-05-02

## 2024-05-06 ENCOUNTER — PATIENT OUTREACH (OUTPATIENT)
Dept: SURGERY | Facility: CLINIC | Age: 24
End: 2024-05-06

## 2024-05-07 ENCOUNTER — PATIENT OUTREACH (OUTPATIENT)
Dept: SURGERY | Facility: CLINIC | Age: 24
End: 2024-05-07

## 2024-05-08 ENCOUNTER — PATIENT OUTREACH (OUTPATIENT)
Dept: SURGERY | Facility: CLINIC | Age: 24
End: 2024-05-08

## 2024-05-08 NOTE — PROGRESS NOTES
HC contacted Ct, sent all documentation required via email and let Ct know that it is only a partial renewal due to changing incomes. HC also offered Ct another day due to timing.

## 2024-05-09 ENCOUNTER — TELEPHONE (OUTPATIENT)
Dept: OBGYN CLINIC | Facility: CLINIC | Age: 24
End: 2024-05-09

## 2024-05-09 ENCOUNTER — PATIENT OUTREACH (OUTPATIENT)
Dept: SURGERY | Facility: CLINIC | Age: 24
End: 2024-05-09

## 2024-05-09 NOTE — PROGRESS NOTES
HC confirmed with Ct that appt will be rescheduled due to HC sick, Ct confirmed for appt next Wednesday at 1pm

## 2024-05-10 ENCOUNTER — PATIENT OUTREACH (OUTPATIENT)
Dept: SURGERY | Facility: CLINIC | Age: 24
End: 2024-05-10

## 2024-05-10 NOTE — PROGRESS NOTES
HC completed June check requisition and signed. HC sent to  [employer] for processing and accounts payable. HC contacted Ct to offer reschedule.

## 2024-05-13 ENCOUNTER — TELEPHONE (OUTPATIENT)
Dept: OBGYN CLINIC | Facility: CLINIC | Age: 24
End: 2024-05-13

## 2024-05-13 ENCOUNTER — PATIENT OUTREACH (OUTPATIENT)
Dept: SURGERY | Facility: CLINIC | Age: 24
End: 2024-05-13

## 2024-05-14 ENCOUNTER — PATIENT OUTREACH (OUTPATIENT)
Dept: SURGERY | Facility: CLINIC | Age: 24
End: 2024-05-14

## 2024-05-14 NOTE — PROGRESS NOTES
HC updated Ct file with supporting documentation, hc contacted ct and ct phone states that phone is not accepting calls at the moment.

## 2024-05-15 ENCOUNTER — PATIENT OUTREACH (OUTPATIENT)
Dept: SURGERY | Facility: CLINIC | Age: 24
End: 2024-05-15

## 2024-05-15 NOTE — PROGRESS NOTES
HC met with Ct for TBRA calculations, Ct addressed the following with HC:    Electric issues   Heater Issues   Issues with repair man   Issues with paying light (ct)   Air conditioner issues  FACT  Ct moved 2022, has completed 2 full years in September 2024   Is currently in a 2br, eligible for a 3-4 BR    Recalculations and income, ct provided income letter from employer and SNAP benefits.

## 2024-05-16 ENCOUNTER — PATIENT OUTREACH (OUTPATIENT)
Dept: SURGERY | Facility: CLINIC | Age: 24
End: 2024-05-16
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
## 2024-05-28 ENCOUNTER — PATIENT OUTREACH (OUTPATIENT)
Dept: SURGERY | Facility: CLINIC | Age: 24
End: 2024-05-28

## 2024-05-30 ENCOUNTER — PATIENT OUTREACH (OUTPATIENT)
Dept: SURGERY | Facility: CLINIC | Age: 24
End: 2024-05-30

## 2024-06-07 ENCOUNTER — PATIENT OUTREACH (OUTPATIENT)
Dept: SURGERY | Facility: CLINIC | Age: 24
End: 2024-06-07

## 2024-06-12 ENCOUNTER — PATIENT OUTREACH (OUTPATIENT)
Dept: SURGERY | Facility: CLINIC | Age: 24
End: 2024-06-12

## 2024-06-12 NOTE — PROGRESS NOTES
HC contacted Ct with updated rent portion via text and email, Ct confirmed and reports no issues at this time.

## 2024-06-13 ENCOUNTER — PATIENT OUTREACH (OUTPATIENT)
Dept: SURGERY | Facility: CLINIC | Age: 24
End: 2024-06-13

## 2024-06-13 NOTE — PROGRESS NOTES
HC contacted [facility] and notified of new rent portion owed by ct and office.

## 2024-06-14 ENCOUNTER — PATIENT OUTREACH (OUTPATIENT)
Dept: SURGERY | Facility: CLINIC | Age: 24
End: 2024-06-14

## 2024-06-24 ENCOUNTER — PATIENT OUTREACH (OUTPATIENT)
Dept: SURGERY | Facility: CLINIC | Age: 24
End: 2024-06-24

## 2024-07-12 ENCOUNTER — PATIENT OUTREACH (OUTPATIENT)
Dept: SURGERY | Facility: CLINIC | Age: 24
End: 2024-07-12

## 2024-07-12 NOTE — PROGRESS NOTES
HC completed August check requisition and signed. HC sent to  [employer] for processing and accounts payable.     HC confirmed with rich July jessenia previously sent.

## 2024-08-02 ENCOUNTER — PATIENT OUTREACH (OUTPATIENT)
Dept: SURGERY | Facility: CLINIC | Age: 24
End: 2024-08-02

## 2024-08-05 ENCOUNTER — TELEPHONE (OUTPATIENT)
Age: 24
End: 2024-08-05

## 2024-08-05 NOTE — TELEPHONE ENCOUNTER
Patient calling in to schedule follow-up appt as her medication is not helping and looking to change medication     Patient asked if it can be made virtual as she has no means of transportation to appt

## 2024-08-06 ENCOUNTER — TELEPHONE (OUTPATIENT)
Dept: BARIATRICS | Facility: CLINIC | Age: 24
End: 2024-08-06

## 2024-08-06 NOTE — TELEPHONE ENCOUNTER
Pt contacted since pt asked if her follow up appt with the provider can be made virtual. Pt now states she can find transportation to the office and she does not need a virtual apt

## 2024-08-09 ENCOUNTER — PATIENT OUTREACH (OUTPATIENT)
Dept: SURGERY | Facility: CLINIC | Age: 24
End: 2024-08-09

## 2024-08-09 ENCOUNTER — HOSPITAL ENCOUNTER (EMERGENCY)
Facility: HOSPITAL | Age: 24
Discharge: HOME | End: 2024-08-09
Attending: EMERGENCY MEDICINE
Payer: MEDICARE

## 2024-08-09 VITALS
RESPIRATION RATE: 18 BRPM | HEART RATE: 100 BPM | DIASTOLIC BLOOD PRESSURE: 92 MMHG | WEIGHT: 166.2 LBS | TEMPERATURE: 99.3 F | OXYGEN SATURATION: 99 % | SYSTOLIC BLOOD PRESSURE: 145 MMHG | BODY MASS INDEX: 31.4 KG/M2

## 2024-08-09 DIAGNOSIS — J06.9 URI (UPPER RESPIRATORY INFECTION): Primary | ICD-10-CM

## 2024-08-09 PROCEDURE — 99282 EMERGENCY DEPT VISIT SF MDM: CPT

## 2024-08-09 PROCEDURE — 99284 EMERGENCY DEPT VISIT MOD MDM: CPT | Performed by: EMERGENCY MEDICINE

## 2024-08-09 RX ORDER — BENZONATATE 100 MG/1
100 CAPSULE ORAL EVERY 8 HOURS
Qty: 21 CAPSULE | Refills: 0 | Status: SHIPPED | OUTPATIENT
Start: 2024-08-09

## 2024-08-09 RX ORDER — BENZONATATE 100 MG/1
100 CAPSULE ORAL ONCE
Status: COMPLETED | OUTPATIENT
Start: 2024-08-09 | End: 2024-08-09

## 2024-08-09 RX ADMIN — BENZONATATE 100 MG: 100 CAPSULE ORAL at 01:09

## 2024-08-09 NOTE — ED PROVIDER NOTES
History  Chief Complaint   Patient presents with    Cold Like Symptoms     Cough/fever x4 days. No meds PTA.        URI  Presenting symptoms: congestion, cough and rhinorrhea    Severity:  Moderate  Onset quality:  Gradual  Duration: several days.  Timing:  Intermittent  Progression:  Waxing and waning  Chronicity:  New  Relieved by:  Nothing  Ineffective treatments:  None tried  Risk factors: sick contacts        Prior to Admission Medications   Prescriptions Last Dose Informant Patient Reported? Taking?   ibuprofen (MOTRIN) 600 mg tablet   Yes No   metFORMIN (GLUCOPHAGE-XR) 750 mg 24 hr tablet   No No   Sig: TAKE 2 TABLETS WITH A MEAL   ondansetron (Zofran ODT) 4 mg disintegrating tablet   No No   Sig: Take 1 tablet (4 mg total) by mouth every 6 (six) hours as needed for nausea or vomiting   sucralfate (CARAFATE) 1 g tablet   No No   Sig: Take 1 tablet (1 g total) by mouth 4 (four) times a day   topiramate (TOPAMAX) 100 mg tablet   Yes No      Facility-Administered Medications: None       Past Medical History:   Diagnosis Date    HIV (human immunodeficiency virus infection) (HCC)        Past Surgical History:   Procedure Laterality Date     SECTION      CHOLECYSTECTOMY         History reviewed. No pertinent family history.  I have reviewed and agree with the history as documented.    E-Cigarette/Vaping    E-Cigarette Use Never User      E-Cigarette/Vaping Substances    Nicotine No     THC No     CBD No     Flavoring No     Other No     Unknown No      Social History     Tobacco Use    Smoking status: Never    Smokeless tobacco: Never   Vaping Use    Vaping status: Never Used   Substance Use Topics    Alcohol use: Not Currently    Drug use: Not Currently     Types: Marijuana       Review of Systems   HENT:  Positive for congestion and rhinorrhea.    Respiratory:  Positive for cough.    All other systems reviewed and are negative.      Physical Exam  Physical Exam  Vitals and nursing note reviewed.    Constitutional:       General: She is not in acute distress.     Appearance: Normal appearance. She is well-developed. She is not ill-appearing, toxic-appearing or diaphoretic.   HENT:      Head: Normocephalic and atraumatic.      Right Ear: External ear normal.      Left Ear: External ear normal.      Nose: Nose normal. No congestion or rhinorrhea.      Mouth/Throat:      Mouth: Mucous membranes are moist.      Pharynx: Oropharynx is clear.   Eyes:      Conjunctiva/sclera: Conjunctivae normal.      Pupils: Pupils are equal, round, and reactive to light.   Cardiovascular:      Rate and Rhythm: Normal rate and regular rhythm.      Heart sounds: Normal heart sounds.   Pulmonary:      Effort: Pulmonary effort is normal. No respiratory distress.      Breath sounds: Normal breath sounds. No wheezing.   Abdominal:      General: Bowel sounds are normal.      Palpations: Abdomen is soft.      Tenderness: There is no abdominal tenderness. There is no guarding.   Musculoskeletal:         General: No tenderness or deformity.      Cervical back: Normal range of motion and neck supple. No rigidity.   Skin:     General: Skin is warm and dry.      Capillary Refill: Capillary refill takes less than 2 seconds.      Findings: No rash.   Neurological:      General: No focal deficit present.      Mental Status: She is alert and oriented to person, place, and time.   Psychiatric:         Mood and Affect: Mood normal.         Behavior: Behavior normal.         Vital Signs  ED Triage Vitals [08/09/24 0103]   Temperature Pulse Respirations Blood Pressure SpO2   99.3 °F (37.4 °C) 100 18 145/92 99 %      Temp Source Heart Rate Source Patient Position - Orthostatic VS BP Location FiO2 (%)   Oral Monitor Sitting Left arm --      Pain Score       --           Vitals:    08/09/24 0103   BP: 145/92   Pulse: 100   Patient Position - Orthostatic VS: Sitting         Visual Acuity      ED Medications  Medications   benzonatate (TESSALON PERLES)  capsule 100 mg (100 mg Oral Given 8/9/24 0109)       Diagnostic Studies  Results Reviewed       None                   No orders to display              Procedures  Procedures         ED Course                                               Medical Decision Making  24-year-old female presents with URI symptoms.  Her son became ill 4 days ago when returning from his father's house.  She presents now with 3 children who have the same symptoms.  She took no medications prior to arrival.  On exam the patient appears well-hydrated and nontoxic.  Discussed part of care measures and reasons return to the ER.  Her son will swab for COVID/flu.  Suspect viral etiology for all of their illnesses.                 Disposition  Final diagnoses:   URI (upper respiratory infection)     Time reflects when diagnosis was documented in both MDM as applicable and the Disposition within this note       Time User Action Codes Description Comment    8/9/2024  1:12 AM Pankaj Vargas Add [J06.9] URI (upper respiratory infection)           ED Disposition       ED Disposition   Discharge    Condition   Stable    Date/Time   Fri Aug 9, 2024  1:12 AM    Comment   Anatoliy Bailey discharge to home/self care.                   Follow-up Information    None         Patient's Medications   Discharge Prescriptions    BENZONATATE (TESSALON PERLES) 100 MG CAPSULE    Take 1 capsule (100 mg total) by mouth every 8 (eight) hours       Start Date: 8/9/2024  End Date: --       Order Dose: 100 mg       Quantity: 21 capsule    Refills: 0       No discharge procedures on file.    PDMP Review         Value Time User    PDMP Reviewed  Yes 12/5/2023  9:05 AM Ada Champagne PA-C            ED Provider  Electronically Signed by             Pankaj Vargas DO  08/09/24 0115

## 2024-08-12 ENCOUNTER — PATIENT OUTREACH (OUTPATIENT)
Dept: SURGERY | Facility: CLINIC | Age: 24
End: 2024-08-12
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
## 2024-08-15 ENCOUNTER — PATIENT OUTREACH (OUTPATIENT)
Dept: SURGERY | Facility: CLINIC | Age: 24
End: 2024-08-15

## 2024-08-15 ENCOUNTER — TELEPHONE (OUTPATIENT)
Dept: BARIATRICS | Facility: CLINIC | Age: 24
End: 2024-08-15

## 2024-08-16 ENCOUNTER — PATIENT OUTREACH (OUTPATIENT)
Dept: SURGERY | Facility: CLINIC | Age: 24
End: 2024-08-16

## 2024-08-20 ENCOUNTER — PATIENT OUTREACH (OUTPATIENT)
Dept: SURGERY | Facility: CLINIC | Age: 24
End: 2024-08-20

## 2024-08-21 ENCOUNTER — PATIENT OUTREACH (OUTPATIENT)
Dept: SURGERY | Facility: CLINIC | Age: 24
End: 2024-08-21

## 2024-08-22 ENCOUNTER — PATIENT OUTREACH (OUTPATIENT)
Dept: SURGERY | Facility: CLINIC | Age: 24
End: 2024-08-22

## 2024-08-22 NOTE — PROGRESS NOTES
HC completed with Ct updated recert. Ct adherent to medical care and medications.Ct states she receives care at [facility] with Dr. Menchaca and receives every 6 mo [place] and completes lab work every 3 months. Ct last saw dentist about 3-4 months ago, does not recall when.    Ct independent and can follow up on their own. Ct aware and understands risks of medication noncompliance. Ct currently not active. Ct receives on-going [facility] housing assistance and  treatment. Ct not awaiting outcomes of application. Some cultural and language barriers. Ct is in need of interpretor for Maori.Acuity level 2, minimum every 3 months    HC went over goals in desired language, HC will follow up on any units found for Ct

## 2024-08-26 NOTE — PROGRESS NOTES
HC contacted by Ct. Ct let HC know that she will not be able to make it to appointment due to her child being sick. HC let Ct know that is ok, HC will follow up with new date

## 2024-08-26 NOTE — PROGRESS NOTES
HC CONTACTED CT TO CONFIRM APPT TOMORROW, CT STATES SON IS STILL SICK AND HC RECEIVED PERMISSION TO COMPLETE RECERT OVER THE PHONE TOMORROW

## 2024-08-28 ENCOUNTER — PATIENT OUTREACH (OUTPATIENT)
Dept: SURGERY | Facility: CLINIC | Age: 24
End: 2024-08-28

## 2024-08-28 NOTE — PROGRESS NOTES
HC contacted Ct, HC was contacted by rich about two places that she would be eligible for. HC let Ct know that HC will be inspecting a unit today and requested for Ct to be there as well, Ct let HC know that she is unable to go due to her work schedule as she works everyday until 4:30 PM. HC will send video/photos to Ct.    HC discussed case with KOURTNEY Fontana; HC completed 2 separate HQS for Ct.

## 2024-08-29 ENCOUNTER — PATIENT OUTREACH (OUTPATIENT)
Dept: SURGERY | Facility: CLINIC | Age: 24
End: 2024-08-29
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
## 2024-08-30 ENCOUNTER — PATIENT OUTREACH (OUTPATIENT)
Dept: SURGERY | Facility: CLINIC | Age: 24
End: 2024-08-30

## 2024-08-30 NOTE — PROGRESS NOTES
HC ASSISTED CT WITH APPLICATION, HC SUBMITTED APPLICATION TO [facility]; HC DISCUSSED CASE WITH HS AND [employer]EL[employer] HESITANT TO RENT TO CT FOR UNKNOWN REASONS.     HS LET HC KNOW THAT HC CAN MOVE CT IN WITHOUT AUTH DUE TO NATURE OF CURRENT SITUATION AND CT SAFETY AT RISK.

## 2024-09-03 ENCOUNTER — PATIENT OUTREACH (OUTPATIENT)
Dept: SURGERY | Facility: CLINIC | Age: 24
End: 2024-09-03
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
## 2024-09-03 NOTE — PROGRESS NOTES
Due to some hesitation HC continued unit search. HC called and talked to Abigail who had a 3 bedroom apartment but it was 3rd floor.     HC dicussed Ct's unit approval with KOURTNEY Pack.

## 2024-09-04 ENCOUNTER — PATIENT OUTREACH (OUTPATIENT)
Dept: SURGERY | Facility: CLINIC | Age: 24
End: 2024-09-04

## 2024-09-04 NOTE — PROGRESS NOTES
HC contacted by maryann, maryann provided HC with lease and residence to rent form. Maryann confirmed with HC that he was able to speak with Ct and Ct is able to move in today and someone will be meeting with her to provide keys.     HC spoke with KOURTNEY Fontana and HS regarding case, maryann inquired about deposit. Ct ly pending due to nature of situation.

## 2024-09-05 ENCOUNTER — PATIENT OUTREACH (OUTPATIENT)
Dept: SURGERY | Facility: CLINIC | Age: 24
End: 2024-09-05

## 2024-09-05 NOTE — PROGRESS NOTES
HC contacted rich, requested for adjustment and addendum for HC to provide payment. HC discussed with HS, Ct rent is over FMR by $20.00 rich willing to decrease rent by this amount. HS approved auth but, must provide signed lease prior to payment.

## 2024-09-06 ENCOUNTER — PATIENT OUTREACH (OUTPATIENT)
Dept: SURGERY | Facility: CLINIC | Age: 24
End: 2024-09-06

## 2024-09-09 ENCOUNTER — PATIENT OUTREACH (OUTPATIENT)
Dept: SURGERY | Facility: CLINIC | Age: 24
End: 2024-09-09

## 2024-09-09 NOTE — PROGRESS NOTES
HC contacted maryann, confirmed with maryann updated rent portion and signed addendum by both ct and himself. Maryann confirmed and let hc know that he will send it.

## 2024-09-09 NOTE — PROGRESS NOTES
HC contacted Ct previous landlord to let landlord know that Ct will be leaving current unit. HC completed September, October and Security Deposit check req and sent to  for [employer] processing.     HC discussed with KOURTNEY Fontana, Ct feels safer at current unit.

## 2024-09-12 ENCOUNTER — PATIENT OUTREACH (OUTPATIENT)
Dept: SURGERY | Facility: CLINIC | Age: 24
End: 2024-09-12

## 2024-09-13 ENCOUNTER — PATIENT OUTREACH (OUTPATIENT)
Dept: SURGERY | Facility: CLINIC | Age: 24
End: 2024-09-13
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
## 2024-09-16 ENCOUNTER — PATIENT OUTREACH (OUTPATIENT)
Dept: SURGERY | Facility: CLINIC | Age: 24
End: 2024-09-16
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
## 2024-09-17 ENCOUNTER — PATIENT OUTREACH (OUTPATIENT)
Dept: SURGERY | Facility: CLINIC | Age: 24
End: 2024-09-17

## 2024-09-17 NOTE — PROGRESS NOTES
HC contacted by AP, AP let HC know that check is ready for pickup; HC collected check and contacted rich villanueva to pick it up tomorrow at 10:30am

## 2024-09-25 ENCOUNTER — PATIENT OUTREACH (OUTPATIENT)
Dept: SURGERY | Facility: CLINIC | Age: 24
End: 2024-09-25

## 2024-10-07 ENCOUNTER — PATIENT OUTREACH (OUTPATIENT)
Dept: SURGERY | Facility: CLINIC | Age: 24
End: 2024-10-07

## 2024-10-14 ENCOUNTER — PATIENT OUTREACH (OUTPATIENT)
Dept: SURGERY | Facility: CLINIC | Age: 24
End: 2024-10-14

## 2024-10-16 ENCOUNTER — PATIENT OUTREACH (OUTPATIENT)
Dept: SURGERY | Facility: CLINIC | Age: 24
End: 2024-10-16

## 2024-10-21 ENCOUNTER — PATIENT OUTREACH (OUTPATIENT)
Dept: SURGERY | Facility: CLINIC | Age: 24
End: 2024-10-21

## 2024-10-21 NOTE — PROGRESS NOTES
HC contacted by Ct, HC contacted Ct at number listed in chart. Ct did not answer and HC left VM, Discussion took place with KOURTNEY Fontana regarding unit, blinds are broken.

## 2024-10-23 ENCOUNTER — PATIENT OUTREACH (OUTPATIENT)
Dept: SURGERY | Facility: CLINIC | Age: 24
End: 2024-10-23

## 2024-10-24 ENCOUNTER — PATIENT OUTREACH (OUTPATIENT)
Dept: SURGERY | Facility: CLINIC | Age: 24
End: 2024-10-24

## 2024-10-24 NOTE — PROGRESS NOTES
HC contacted Ct, Ct let HC know that she is need of letter for [employer]. HC drafted and sent letter to ct email provided and ct confirmed she received it.

## 2024-10-25 ENCOUNTER — PATIENT OUTREACH (OUTPATIENT)
Dept: SURGERY | Facility: CLINIC | Age: 24
End: 2024-10-25

## 2024-10-25 NOTE — PROGRESS NOTES
HC completed November check requisition and signed. HC sent to  [employer] for processing and accounts payable.

## 2024-10-31 ENCOUNTER — PATIENT OUTREACH (OUTPATIENT)
Dept: SURGERY | Facility: CLINIC | Age: 24
End: 2024-10-31

## 2024-11-04 ENCOUNTER — PATIENT OUTREACH (OUTPATIENT)
Dept: SURGERY | Facility: CLINIC | Age: 24
End: 2024-11-04

## 2024-11-11 ENCOUNTER — PATIENT OUTREACH (OUTPATIENT)
Dept: SURGERY | Facility: CLINIC | Age: 24
End: 2024-11-11

## 2024-11-11 ENCOUNTER — TELEPHONE (OUTPATIENT)
Dept: OBGYN CLINIC | Facility: CLINIC | Age: 24
End: 2024-11-11

## 2024-11-11 NOTE — PROGRESS NOTES
HC contacted Ct to notify Ct that coats for kids voucher arrived in office and are eligible for pickup. Ct Let hc Know that ct does not have access to transport.

## 2024-11-12 ENCOUNTER — PATIENT OUTREACH (OUTPATIENT)
Dept: SURGERY | Facility: CLINIC | Age: 24
End: 2024-11-12

## 2024-11-12 NOTE — PROGRESS NOTES
HC contacted Ct, HC inquired if Ct will be home for drop off vouchers; HC discussed with KOURTNEY Fontana

## 2024-11-29 ENCOUNTER — PATIENT OUTREACH (OUTPATIENT)
Dept: SURGERY | Facility: CLINIC | Age: 24
End: 2024-11-29

## 2024-11-29 NOTE — PROGRESS NOTES
HC completed December check requisition and signed. HC sent to  [employer] for processing and accounts payable.

## 2024-12-24 ENCOUNTER — PATIENT OUTREACH (OUTPATIENT)
Dept: SURGERY | Facility: CLINIC | Age: 24
End: 2024-12-24

## 2024-12-24 NOTE — PROGRESS NOTES
HC completed January check requisition for rent and signed. HC sent to [employer] for processing and accounts payable.

## 2025-01-01 ENCOUNTER — HOSPITAL ENCOUNTER (EMERGENCY)
Facility: HOSPITAL | Age: 25
Discharge: HOME | End: 2025-01-01
Attending: EMERGENCY MEDICINE
Payer: MEDICARE

## 2025-01-01 ENCOUNTER — APPOINTMENT (EMERGENCY)
Dept: ULTRASOUND IMAGING | Facility: HOSPITAL | Age: 25
End: 2025-01-01
Payer: MEDICARE

## 2025-01-01 VITALS
OXYGEN SATURATION: 97 % | RESPIRATION RATE: 18 BRPM | SYSTOLIC BLOOD PRESSURE: 139 MMHG | WEIGHT: 213 LBS | BODY MASS INDEX: 40.25 KG/M2 | TEMPERATURE: 98 F | DIASTOLIC BLOOD PRESSURE: 73 MMHG | HEART RATE: 101 BPM

## 2025-01-01 DIAGNOSIS — O36.80X0 PREGNANCY OF UNKNOWN ANATOMIC LOCATION: ICD-10-CM

## 2025-01-01 DIAGNOSIS — R10.9 ABDOMINAL PAIN: Primary | ICD-10-CM

## 2025-01-01 LAB
ABO GROUP BLD: NORMAL
B-HCG SERPL-ACNC: 1638.6 MIU/ML (ref 0–5)
BACTERIA UR QL AUTO: ABNORMAL /HPF
BASOPHILS # BLD AUTO: 0.04 THOUSANDS/ΜL (ref 0–0.1)
BASOPHILS NFR BLD AUTO: 0 % (ref 0–1)
BILIRUB UR QL STRIP: NEGATIVE
BLD GP AB SCN SERPL QL: NEGATIVE
CLARITY UR: CLEAR
COLOR UR: YELLOW
EOSINOPHIL # BLD AUTO: 0.11 THOUSAND/ΜL (ref 0–0.61)
EOSINOPHIL NFR BLD AUTO: 1 % (ref 0–6)
ERYTHROCYTE [DISTWIDTH] IN BLOOD BY AUTOMATED COUNT: 12.7 % (ref 11.6–15.1)
EXT PREGNANCY TEST URINE: POSITIVE
EXT. CONTROL: ABNORMAL
GLUCOSE UR STRIP-MCNC: NEGATIVE MG/DL
HCT VFR BLD AUTO: 38.7 % (ref 34.8–46.1)
HGB BLD-MCNC: 12.3 G/DL (ref 11.5–15.4)
HGB UR QL STRIP.AUTO: 150
IMM GRANULOCYTES # BLD AUTO: 0.04 THOUSAND/UL (ref 0–0.2)
IMM GRANULOCYTES NFR BLD AUTO: 0 % (ref 0–2)
KETONES UR STRIP-MCNC: NEGATIVE MG/DL
LEUKOCYTE ESTERASE UR QL STRIP: 25
LYMPHOCYTES # BLD AUTO: 3.9 THOUSANDS/ΜL (ref 0.6–4.47)
LYMPHOCYTES NFR BLD AUTO: 36 % (ref 14–44)
MCH RBC QN AUTO: 27 PG (ref 26.8–34.3)
MCHC RBC AUTO-ENTMCNC: 31.8 G/DL (ref 31.4–37.4)
MCV RBC AUTO: 85 FL (ref 82–98)
MONOCYTES # BLD AUTO: 0.77 THOUSAND/ΜL (ref 0.17–1.22)
MONOCYTES NFR BLD AUTO: 7 % (ref 4–12)
NEUTROPHILS # BLD AUTO: 6.05 THOUSANDS/ΜL (ref 1.85–7.62)
NEUTS SEG NFR BLD AUTO: 56 % (ref 43–75)
NITRITE UR QL STRIP: NEGATIVE
NON-SQ EPI CELLS URNS QL MICRO: ABNORMAL /HPF
NRBC BLD AUTO-RTO: 0 /100 WBCS
PH UR STRIP.AUTO: 7 [PH]
PLATELET # BLD AUTO: 283 THOUSANDS/UL (ref 149–390)
PMV BLD AUTO: 9.9 FL (ref 8.9–12.7)
PROT UR STRIP-MCNC: ABNORMAL MG/DL
RBC # BLD AUTO: 4.56 MILLION/UL (ref 3.81–5.12)
RBC #/AREA URNS AUTO: ABNORMAL /HPF
RH BLD: POSITIVE
SP GR UR STRIP.AUTO: 1.01 (ref 1–1.04)
SPECIMEN EXPIRATION DATE: NORMAL
UROBILINOGEN UA: 1 MG/DL
WBC # BLD AUTO: 10.91 THOUSAND/UL (ref 4.31–10.16)
WBC #/AREA URNS AUTO: ABNORMAL /HPF

## 2025-01-01 PROCEDURE — 76815 OB US LIMITED FETUS(S): CPT

## 2025-01-01 PROCEDURE — 84702 CHORIONIC GONADOTROPIN TEST: CPT

## 2025-01-01 PROCEDURE — 81025 URINE PREGNANCY TEST: CPT

## 2025-01-01 PROCEDURE — 99284 EMERGENCY DEPT VISIT MOD MDM: CPT

## 2025-01-01 PROCEDURE — 81001 URINALYSIS AUTO W/SCOPE: CPT

## 2025-01-01 PROCEDURE — 86901 BLOOD TYPING SEROLOGIC RH(D): CPT

## 2025-01-01 PROCEDURE — 86850 RBC ANTIBODY SCREEN: CPT

## 2025-01-01 PROCEDURE — 86900 BLOOD TYPING SEROLOGIC ABO: CPT

## 2025-01-01 PROCEDURE — 81003 URINALYSIS AUTO W/O SCOPE: CPT

## 2025-01-01 PROCEDURE — 85025 COMPLETE CBC W/AUTO DIFF WBC: CPT

## 2025-01-01 PROCEDURE — 36415 COLL VENOUS BLD VENIPUNCTURE: CPT

## 2025-01-01 RX ORDER — ACETAMINOPHEN 325 MG/1
975 TABLET ORAL ONCE
Status: COMPLETED | OUTPATIENT
Start: 2025-01-01 | End: 2025-01-01

## 2025-01-01 RX ADMIN — ACETAMINOPHEN 975 MG: 325 TABLET, FILM COATED ORAL at 17:48

## 2025-01-01 NOTE — ED PROVIDER NOTES
Time reflects when diagnosis was documented in both MDM as applicable and the Disposition within this note       Time User Action Codes Description Comment    1/1/2025  9:15 PM Rivka Anaya [R10.9] Abdominal pain     1/1/2025  9:15 PM Rivka Anaya Add [O36.80X0] Pregnancy of unknown anatomic location           ED Disposition       ED Disposition   Discharge    Condition   Stable    Date/Time   Wed Jan 1, 2025  9:15 PM    Comment   Anatoliy Yoon Leo discharge to home/self care.                   Assessment & Plan       Medical Decision Making  Differential diagnosis to include but not limited to: uti, cystitis, pregnancy, ectopic pregnancy  Positive pregnancy test today, UA without signs for infection.   Due to pelvic pain, will obtain hCG to determine indication for ultrasound. Type and screen without indication for Rhogam.   hCG high enough to obtain pelvic U/S.  Patient requesting to sign out prior to ultrasound results. Discussed risks v benefits of leaving. With shared clinical decision making, will discharge patient pending the ultrasound read. Plan to call patient with these results and further instructions at that time. Recommend repeat hCG in two days.   Pt stable at time of discharge, vital signs reviewed, questions answered. Strict ER return precautions provided/discussed and were well understood by patient. Patient's vitals, labs and/or imaging results, diagnosis, and treatment plan were discussed with the patient. All new and/or changed medications were discussed - specifically to include route of administration, how often to take, when to take, and the pharmacy they were sent to. Strict return precautions as well as close follow up with PCP was discussed with the patient and the patient was agreeable to my recommendations.  Patient verbally acknowledged understanding. All labs, imaging were reviewed and used in the medical decision making process (if ordered).     Portions of this  "chart may have been written with voice recognition software.  Occasional grammatical errors, wrong word or \"sound a like\" substitutions may have occurred due to software limitations.  Please read carefully and use context to recognize where substitutions have occurred.      Problems Addressed:  Abdominal pain: acute illness or injury  Pregnancy of unknown anatomic location: acute illness or injury    Amount and/or Complexity of Data Reviewed  Labs: ordered. Decision-making details documented in ED Course.  Radiology: ordered.    Risk  OTC drugs.        ED Course as of 01/01/25 2146   Wed Jan 01, 2025   1732 PREGNANCY TEST URINE(!): Positive   1752 WBC(!): 10.91   1842 HCG QUANTITATIVE(!): 1,638.6   2116 Pt requesting to leave prior to ultrasound results. Discussed risks of leaving, patient would still like to be discharged at this time. With share clinical decision making discussed obtaining hcg level in two days at an outpatient facility for lab work.        Medications   acetaminophen (TYLENOL) tablet 975 mg (975 mg Oral Given 1/1/25 1748)       ED Risk Strat Scores                          SBIRT 20yo+      Flowsheet Row Most Recent Value   Initial Alcohol Screen: US AUDIT-C     1. How often do you have a drink containing alcohol? 0 Filed at: 01/01/2025 1728   2. How many drinks containing alcohol do you have on a typical day you are drinking?  0 Filed at: 01/01/2025 1728   3b. FEMALE Any Age, or MALE 65+: How often do you have 4 or more drinks on one occassion? 0 Filed at: 01/01/2025 1728   Audit-C Score 0 Filed at: 01/01/2025 1728   PUMA: How many times in the past year have you...    Used an illegal drug or used a prescription medication for non-medical reasons? Never Filed at: 01/01/2025 1728                            History of Present Illness       Chief Complaint   Patient presents with    Abdominal Pain     Sharp suprapubic pain x 2 weeks has nausea, denies vomiting or diarrhea.        Past Medical " History:   Diagnosis Date    HIV (human immunodeficiency virus infection) (HCC)       Past Surgical History:   Procedure Laterality Date     SECTION      CHOLECYSTECTOMY        History reviewed. No pertinent family history.   Social History     Tobacco Use    Smoking status: Never    Smokeless tobacco: Never   Vaping Use    Vaping status: Never Used   Substance Use Topics    Alcohol use: Not Currently    Drug use: Not Currently     Types: Marijuana      E-Cigarette/Vaping    E-Cigarette Use Never User       E-Cigarette/Vaping Substances    Nicotine No     THC No     CBD No     Flavoring No     Other No     Unknown No       I have reviewed and agree with the history as documented.     Anatoliy is a 24 year old female presenting to the ED for abdominal pain and nausea x 2 weeks, report she was on her period the past two days but is no longer bleeding. Denies chance for pregnancy but is unsure when her last period was before the bleeding two days ago. Denies urinary symptoms.         Review of Systems   Constitutional:  Negative for chills and fever.   Respiratory:  Negative for cough and shortness of breath.    Cardiovascular:  Negative for chest pain and palpitations.   Gastrointestinal:  Positive for abdominal pain and nausea. Negative for diarrhea and vomiting.   Genitourinary:  Positive for pelvic pain. Negative for dysuria, frequency and urgency.   Musculoskeletal:  Negative for myalgias, neck pain and neck stiffness.   Skin:  Negative for rash.   Neurological:  Negative for light-headedness and headaches.           Objective       ED Triage Vitals [25 1721]   Temperature Pulse Blood Pressure Respirations SpO2 Patient Position - Orthostatic VS   98 °F (36.7 °C) 101 139/73 18 97 % Sitting      Temp Source Heart Rate Source BP Location FiO2 (%) Pain Score    Oral Monitor Left arm -- 6      Vitals      Date and Time Temp Pulse SpO2 Resp BP Pain Score FACES Pain Rating User   25 1721 98 °F (36.7 °C)  101 97 % 18 139/73 6 -- SN            Physical Exam  Vitals reviewed.   Constitutional:       General: She is not in acute distress.     Appearance: Normal appearance. She is not ill-appearing or toxic-appearing.      Comments: Upon entering the room, patient is in no acute distress laying on her stomach on her phone.   HENT:      Head: Normocephalic and atraumatic.      Right Ear: External ear normal.      Left Ear: External ear normal.      Nose: Nose normal.      Mouth/Throat:      Mouth: Mucous membranes are moist.      Pharynx: Oropharynx is clear. No oropharyngeal exudate or posterior oropharyngeal erythema.   Eyes:      General: No scleral icterus.        Right eye: No discharge.         Left eye: No discharge.      Extraocular Movements: Extraocular movements intact.      Conjunctiva/sclera: Conjunctivae normal.      Pupils: Pupils are equal, round, and reactive to light.   Cardiovascular:      Rate and Rhythm: Normal rate and regular rhythm.      Pulses: Normal pulses.      Heart sounds: Normal heart sounds.   Pulmonary:      Effort: Pulmonary effort is normal. No respiratory distress.      Breath sounds: Normal breath sounds.   Abdominal:      General: There is no distension.      Palpations: Abdomen is soft. There is no mass.      Tenderness: There is no right CVA tenderness, left CVA tenderness, guarding or rebound.      Comments: Pain to palpation over the suprapubic region, along with bilateral pelvic regions. No guarding. No masses. No distension.   Musculoskeletal:         General: Normal range of motion.      Cervical back: Normal range of motion and neck supple. No rigidity or tenderness.   Lymphadenopathy:      Cervical: No cervical adenopathy.   Skin:     General: Skin is warm and dry.      Capillary Refill: Capillary refill takes less than 2 seconds.   Neurological:      General: No focal deficit present.      Mental Status: She is alert.   Psychiatric:         Mood and Affect: Mood normal.          Behavior: Behavior normal.         Results Reviewed       Procedure Component Value Units Date/Time    hCG, quantitative [[id number]]  (Abnormal) Collected: 01/01/25 1743    Lab Status: Final result Specimen: Blood from Arm, Left Updated: 01/01/25 1842     HCG, Quant 1,638.6 mIU/mL     Narrative:       Expected Ranges:    HCG results between 5.0 and 25.0 mIU/mL may be indicative of early pregnancy but should be interpreted in light of the total clinical presentation.    HCG can rise to detectable levels in roman and post menopausal women (0-11.6 mIU/mL).     Approximate               Approximate HCG  Gestation age          Concentration ( mIU/mL)  _____________          ______________________   Weeks                      HCG values  0.2-1                       5-50  1-2                           2-3                         100-5000  3-4                         500-32350  4-5                         1000-01696  5-6                         [id number]  6-8                         [id number]  8-12                        [id number]      CBC and differential [[id number]]  (Abnormal) Collected: 01/01/25 1743    Lab Status: Final result Specimen: Blood from Arm, Left Updated: 01/01/25 1751     WBC 10.91 Thousand/uL      RBC 4.56 Million/uL      Hemoglobin 12.3 g/dL      Hematocrit 38.7 %      MCV 85 fL      MCH 27.0 pg      MCHC 31.8 g/dL      RDW 12.7 %      MPV 9.9 fL      Platelets 283 Thousands/uL      nRBC 0 /100 WBCs      Segmented % 56 %      Immature Grans % 0 %      Lymphocytes % 36 %      Monocytes % 7 %      Eosinophils Relative 1 %      Basophils Relative 0 %      Absolute Neutrophils 6.05 Thousands/µL      Absolute Immature Grans 0.04 Thousand/uL      Absolute Lymphocytes 3.90 Thousands/µL      Absolute Monocytes 0.77 Thousand/µL      Eosinophils Absolute 0.11 Thousand/µL      Basophils Absolute 0.04 Thousands/µL     Urine Microscopic [[id number]]  (Abnormal) Collected: 01/01/25 1724    Lab Status:  Final result Specimen: Urine, Clean Catch Updated: 01/01/25 1751     RBC, UA 4-10 /hpf      WBC, UA 1-2 /hpf      Epithelial Cells Occasional /hpf      Bacteria, UA None Seen /hpf     UA w Reflex to Microscopic w Reflex to Culture [[id number]]  (Abnormal) Collected: 01/01/25 1724    Lab Status: Final result Specimen: Urine, Clean Catch Updated: 01/01/25 1735     Color, UA Yellow     Clarity, UA Clear     Specific Gravity, UA 1.010     pH, UA 7.0     Leukocytes, UA 25.0     Nitrite, UA Negative     Protein, UA 15 (Trace) mg/dl      Glucose, UA Negative mg/dl      Ketones, UA Negative mg/dl      Bilirubin, UA Negative     Occult Blood, .0     UROBILINOGEN UA 1.0 mg/dL     POCT pregnancy, urine [[id number]]  (Abnormal) Collected: 01/01/25 1727    Lab Status: Final result Updated: 01/01/25 1727     EXT Preg Test, Ur Positive     Control Valid            US OB pregnancy limited with transvaginal    (Results Pending)       Procedures    ED Medication and Procedure Management   Prior to Admission Medications   Prescriptions Last Dose Informant Patient Reported? Taking?   benzonatate (TESSALON PERLES) 100 mg capsule Not Taking  No No   Sig: Take 1 capsule (100 mg total) by mouth every 8 (eight) hours   Patient not taking: Reported on 1/1/2025   ibuprofen (MOTRIN) 600 mg tablet Not Taking  Yes No   Patient not taking: Reported on 1/1/2025   metFORMIN (GLUCOPHAGE-XR) 750 mg 24 hr tablet 12/31/2024  No Yes   Sig: TAKE 2 TABLETS WITH A MEAL   ondansetron (Zofran ODT) 4 mg disintegrating tablet   No No   Sig: Take 1 tablet (4 mg total) by mouth every 6 (six) hours as needed for nausea or vomiting   sucralfate (CARAFATE) 1 g tablet 12/31/2024  No Yes   Sig: Take 1 tablet (1 g total) by mouth 4 (four) times a day   topiramate (TOPAMAX) 100 mg tablet 12/31/2024  Yes Yes      Facility-Administered Medications: None     Discharge Medication List as of 1/1/2025  9:16 PM        CONTINUE these medications which have NOT CHANGED     Details   metFORMIN (GLUCOPHAGE-XR) 750 mg 24 hr tablet TAKE 2 TABLETS WITH A MEAL, Normal      sucralfate (CARAFATE) 1 g tablet Take 1 tablet (1 g total) by mouth 4 (four) times a day, Starting Tue 2/20/2024, Normal      topiramate (TOPAMAX) 100 mg tablet Historical Med      benzonatate (TESSALON PERLES) 100 mg capsule Take 1 capsule (100 mg total) by mouth every 8 (eight) hours, Starting Fri 8/9/2024, Normal      ibuprofen (MOTRIN) 600 mg tablet Historical Med      ondansetron (Zofran ODT) 4 mg disintegrating tablet Take 1 tablet (4 mg total) by mouth every 6 (six) hours as needed for nausea or vomiting, Starting Tue 2/20/2024, Normal           Outpatient Discharge Orders   hCG, quantitative   Standing Status: Future Standing Exp. Date: 01/01/26     ED SEPSIS DOCUMENTATION   Time reflects when diagnosis was documented in both MDM as applicable and the Disposition within this note       Time User Action Codes Description Comment    1/1/2025  9:15 PM Rivka Anaya [R10.9] Abdominal pain     1/1/2025  9:15 PM Rivka Anaya [O36.80X0] Pregnancy of unknown anatomic location                  Rivka Anaya PA-C  01/01/25 2143

## 2025-01-08 ENCOUNTER — PATIENT OUTREACH (OUTPATIENT)
Dept: SURGERY | Facility: CLINIC | Age: 25
End: 2025-01-08
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
## 2025-01-22 ENCOUNTER — PATIENT OUTREACH (OUTPATIENT)
Dept: SURGERY | Facility: CLINIC | Age: 25
End: 2025-01-22

## 2025-01-22 NOTE — PROGRESS NOTES
HC followed up with GERARD Lynch, office was having difficulty reaching her and are concerned for ct. HC provided new contact and inquired about previous family situation with CM for clarification. HC confirmed Ct is in good standing with housing assistance.

## 2025-01-23 ENCOUNTER — PATIENT OUTREACH (OUTPATIENT)
Dept: SURGERY | Facility: CLINIC | Age: 25
End: 2025-01-23

## 2025-01-23 NOTE — PROGRESS NOTES
CM contacted HC, CM confirmed that current phone and previous # do not work. HC texted Ct, let CM know that if Ct reaches out, HC will relay message - Ct missed previous dose. CM confirmed she does not know about her previous family situation or current. CM requested most recent income, HC provided to CM.

## 2025-01-27 ENCOUNTER — PATIENT OUTREACH (OUTPATIENT)
Dept: SURGERY | Facility: CLINIC | Age: 25
End: 2025-01-27

## 2025-01-27 NOTE — PROGRESS NOTES
HC contacted by GERARD, GERARD was able to get in touch with Ct and was able to schedule an appointment with her.

## 2025-01-28 ENCOUNTER — PATIENT OUTREACH (OUTPATIENT)
Dept: SURGERY | Facility: CLINIC | Age: 25
End: 2025-01-28

## 2025-01-30 ENCOUNTER — PATIENT OUTREACH (OUTPATIENT)
Dept: SURGERY | Facility: CLINIC | Age: 25
End: 2025-01-30

## 2025-02-07 ENCOUNTER — PATIENT OUTREACH (OUTPATIENT)
Dept: SURGERY | Facility: CLINIC | Age: 25
End: 2025-02-07

## 2025-02-10 ENCOUNTER — PATIENT OUTREACH (OUTPATIENT)
Dept: SURGERY | Facility: CLINIC | Age: 25
End: 2025-02-10

## 2025-02-14 ENCOUNTER — PATIENT OUTREACH (OUTPATIENT)
Dept: SURGERY | Facility: CLINIC | Age: 25
End: 2025-02-14

## 2025-02-14 NOTE — PROGRESS NOTES
KOURTNEY discussed issues with maryann regarding 1099 that deposit was included in the 1099 and that's why the amount was more. Maryann states he understands.

## 2025-02-17 ENCOUNTER — PATIENT OUTREACH (OUTPATIENT)
Dept: SURGERY | Facility: CLINIC | Age: 25
End: 2025-02-17

## 2025-02-22 ENCOUNTER — HOSPITAL ENCOUNTER (EMERGENCY)
Facility: HOSPITAL | Age: 25
Discharge: HOME | End: 2025-02-22
Attending: EMERGENCY MEDICINE
Payer: COMMERCIAL

## 2025-02-22 VITALS
HEART RATE: 99 BPM | SYSTOLIC BLOOD PRESSURE: 127 MMHG | RESPIRATION RATE: 18 BRPM | WEIGHT: 211.2 LBS | OXYGEN SATURATION: 99 % | BODY MASS INDEX: 39.91 KG/M2 | TEMPERATURE: 98 F | DIASTOLIC BLOOD PRESSURE: 76 MMHG

## 2025-02-22 DIAGNOSIS — K21.9 ACID REFLUX: Primary | ICD-10-CM

## 2025-02-22 PROCEDURE — 99283 EMERGENCY DEPT VISIT LOW MDM: CPT

## 2025-02-22 PROCEDURE — 99284 EMERGENCY DEPT VISIT MOD MDM: CPT | Performed by: PHYSICIAN ASSISTANT

## 2025-02-22 RX ORDER — SUCRALFATE 1 G/1
1 TABLET ORAL ONCE
Status: COMPLETED | OUTPATIENT
Start: 2025-02-22 | End: 2025-02-22

## 2025-02-22 RX ORDER — SUCRALFATE ORAL 1 G/10ML
1 SUSPENSION ORAL 3 TIMES DAILY
Qty: 473 ML | Refills: 0 | Status: SHIPPED | OUTPATIENT
Start: 2025-02-22

## 2025-02-22 RX ORDER — MAGNESIUM HYDROXIDE/ALUMINUM HYDROXICE/SIMETHICONE 120; 1200; 1200 MG/30ML; MG/30ML; MG/30ML
30 SUSPENSION ORAL ONCE
Status: COMPLETED | OUTPATIENT
Start: 2025-02-22 | End: 2025-02-22

## 2025-02-22 RX ORDER — PANTOPRAZOLE SODIUM 20 MG/1
20 TABLET, DELAYED RELEASE ORAL DAILY
Qty: 20 TABLET | Refills: 0 | Status: SHIPPED | OUTPATIENT
Start: 2025-02-22

## 2025-02-22 RX ORDER — FAMOTIDINE 20 MG/1
20 TABLET, FILM COATED ORAL ONCE
Status: COMPLETED | OUTPATIENT
Start: 2025-02-22 | End: 2025-02-22

## 2025-02-22 RX ADMIN — FAMOTIDINE 20 MG: 20 TABLET, FILM COATED ORAL at 00:51

## 2025-02-22 RX ADMIN — ALUMINUM HYDROXIDE, MAGNESIUM HYDROXIDE, AND SIMETHICONE 30 ML: 200; 200; 20 SUSPENSION ORAL at 00:51

## 2025-02-22 RX ADMIN — SUCRALFATE 1 G: 1 TABLET ORAL at 00:51

## 2025-02-22 NOTE — Clinical Note
Anatoliy Bailey was seen and treated in our emergency department on 2/22/2025.                Diagnosis:     Anatoliy  may return to work on return date.    She may return on this date: 02/23/2025         If you have any questions or concerns, please don't hesitate to call.      Nicki Gunn RN    ______________________________           _______________          _______________  Hospital Representative                              Date                                Time

## 2025-02-22 NOTE — ED PROVIDER NOTES
Time reflects when diagnosis was documented in both MDM as applicable and the Disposition within this note       Time User Action Codes Description Comment    2/22/2025  1:06 AM Chelsie Wilkerson Add [K21.9] Acid reflux           ED Disposition       ED Disposition   Discharge    Condition   Stable    Date/Time   Sat Feb 22, 2025  1:07 AM    Comment   Anatoliy Yoon Leo discharge to home/self care.                   Assessment & Plan       Medical Decision Making  Patient presented complaining of burning in her throat as well as mild epigastric pain with a history of GERD and gastric ulcers.  Patient was offered further evaluation today including laboratory evaluation and CAT scan.  Patient declines any further intervention stating that she has someone to be I would just like medications.  Patient was made aware that with her history she could be at risk for other acute pathology such as perforated ulcer, pancreatitis, cholecystitis or other intra-abdominal pathology or infection.  Patient demonstrates understanding and continues to refuse further evaluation and would only like medications given today.  Patient was educated on dietary changes and encouraged to return to the emergency department.    Risk  OTC drugs.  Prescription drug management.             Medications   sucralfate (CARAFATE) tablet 1 g (1 g Oral Given 2/22/25 0051)   aluminum-magnesium hydroxide-simethicone (MAALOX) oral suspension 30 mL (30 mL Oral Given 2/22/25 0051)   famotidine (PEPCID) tablet 20 mg (20 mg Oral Given 2/22/25 0051)       ED Risk Strat Scores                                                History of Present Illness       Chief Complaint   Patient presents with    Epigastric Pain     Pt c/o epigastric pain and burning in throat onset 5 days ago; pt notes when she tries to eat she then vomits       Past Medical History:   Diagnosis Date    HIV (human immunodeficiency virus infection) (HCC)       Past Surgical History:    Procedure Laterality Date     SECTION      CHOLECYSTECTOMY        History reviewed. No pertinent family history.   Social History     Tobacco Use    Smoking status: Never    Smokeless tobacco: Never   Vaping Use    Vaping status: Never Used   Substance Use Topics    Alcohol use: Not Currently    Drug use: Not Currently     Types: Marijuana      E-Cigarette/Vaping    E-Cigarette Use Never User       E-Cigarette/Vaping Substances    Nicotine No     THC No     CBD No     Flavoring No     Other No     Unknown No       I have reviewed and agree with the history as documented.     25-year-old female with history of HIV treated on Biktarvy as well as history of ulcers presents complaining of epigastric pain associated with burning and acid sensation in her throat.  Patient states the symptoms have been going on for 5 days and that she believes she is experiencing acid reflux or an ulcer again.  Patient does not want any evaluation today and states she has somewhere to be but states that she only wants medications and to go home.  Patient declines any further intervention today.      History provided by:  Patient   used: No        Review of Systems   Constitutional: Negative.  Negative for chills and fatigue.   HENT:  Negative for ear pain and sore throat.    Eyes:  Negative for photophobia and redness.   Respiratory:  Negative for apnea, cough and shortness of breath.    Cardiovascular:  Negative for chest pain.   Gastrointestinal:  Positive for abdominal pain. Negative for nausea and vomiting.        Acid reflux   Genitourinary:  Negative for dysuria.   Musculoskeletal:  Negative for arthralgias, neck pain and neck stiffness.   Skin:  Negative for rash.   Neurological:  Negative for dizziness, tremors, syncope and weakness.   Psychiatric/Behavioral:  Negative for suicidal ideas.            Objective       ED Triage Vitals [25 0025]   Temperature Pulse Blood Pressure Respirations SpO2  Patient Position - Orthostatic VS   98 °F (36.7 °C) 99 127/76 18 99 % Sitting      Temp Source Heart Rate Source BP Location FiO2 (%) Pain Score    Oral -- Left arm -- --      Vitals      Date and Time Temp Pulse SpO2 Resp BP Pain Score FACES Pain Rating User   02/22/25 0025 98 °F (36.7 °C) 99 99 % 18 127/76 -- -- CO            Physical Exam  Constitutional:       General: She is not in acute distress.     Appearance: She is well-developed. She is not diaphoretic.   Eyes:      Pupils: Pupils are equal, round, and reactive to light.   Cardiovascular:      Rate and Rhythm: Normal rate and regular rhythm.   Pulmonary:      Effort: Pulmonary effort is normal. No respiratory distress.      Breath sounds: Normal breath sounds.   Abdominal:      General: Bowel sounds are normal. There is no distension.      Palpations: Abdomen is soft.      Tenderness: There is abdominal tenderness (mild epigastric). There is no right CVA tenderness, left CVA tenderness or guarding.   Musculoskeletal:         General: Normal range of motion.      Cervical back: Normal range of motion and neck supple.   Skin:     General: Skin is warm and dry.   Neurological:      Mental Status: She is alert and oriented to person, place, and time.         Results Reviewed       None            No orders to display       Procedures    ED Medication and Procedure Management   Prior to Admission Medications   Prescriptions Last Dose Informant Patient Reported? Taking?   benzonatate (TESSALON PERLES) 100 mg capsule   No No   Sig: Take 1 capsule (100 mg total) by mouth every 8 (eight) hours   Patient not taking: Reported on 1/1/2025   ibuprofen (MOTRIN) 600 mg tablet   Yes No   Patient not taking: Reported on 1/1/2025   metFORMIN (GLUCOPHAGE-XR) 750 mg 24 hr tablet   No No   Sig: TAKE 2 TABLETS WITH A MEAL   ondansetron (Zofran ODT) 4 mg disintegrating tablet   No No   Sig: Take 1 tablet (4 mg total) by mouth every 6 (six) hours as needed for nausea or vomiting    sucralfate (CARAFATE) 1 g tablet   No No   Sig: Take 1 tablet (1 g total) by mouth 4 (four) times a day   topiramate (TOPAMAX) 100 mg tablet 2/22/2025 Morning  Yes Yes   Sig: Take 100 mg by mouth 2 (two) times a day      Facility-Administered Medications: None     Patient's Medications   Discharge Prescriptions    PANTOPRAZOLE (PROTONIX) 20 MG TABLET    Take 1 tablet (20 mg total) by mouth daily       Start Date: 2/22/2025 End Date: --       Order Dose: 20 mg       Quantity: 20 tablet    Refills: 0    SUCRALFATE (CARAFATE) 1 G/10 ML SUSPENSION    Take 10 mL (1 g total) by mouth 3 (three) times a day       Start Date: 2/22/2025 End Date: --       Order Dose: 1 g       Quantity: 473 mL    Refills: 0     No discharge procedures on file.  ED SEPSIS DOCUMENTATION   Time reflects when diagnosis was documented in both MDM as applicable and the Disposition within this note       Time User Action Codes Description Comment    2/22/2025  1:06 AM Chelsie Wilkerson Add [K21.9] Acid reflux                  Chelsie Wilkerson PA-C  02/22/25 0110

## 2025-02-28 ENCOUNTER — PATIENT OUTREACH (OUTPATIENT)
Dept: SURGERY | Facility: CLINIC | Age: 25
End: 2025-02-28

## 2025-03-03 ENCOUNTER — PATIENT OUTREACH (OUTPATIENT)
Dept: SURGERY | Facility: CLINIC | Age: 25
End: 2025-03-03

## 2025-03-04 ENCOUNTER — PATIENT OUTREACH (OUTPATIENT)
Dept: SURGERY | Facility: CLINIC | Age: 25
End: 2025-03-04

## 2025-03-06 ENCOUNTER — PATIENT OUTREACH (OUTPATIENT)
Dept: SURGERY | Facility: CLINIC | Age: 25
End: 2025-03-06

## 2025-03-10 ENCOUNTER — PATIENT OUTREACH (OUTPATIENT)
Dept: SURGERY | Facility: CLINIC | Age: 25
End: 2025-03-10

## 2025-03-11 ENCOUNTER — PATIENT OUTREACH (OUTPATIENT)
Dept: SURGERY | Facility: CLINIC | Age: 25
End: 2025-03-11

## 2025-03-12 NOTE — PROGRESS NOTES
HC contacted Ct with updated appointment for March 19th at 10am. HC sent required documentation to Ct via text.

## 2025-03-14 NOTE — PROGRESS NOTES
HC contacted by Ct, Ct wanted to know if HC has copy of SSC for her son; HC confirmed that HC only took copies of her documents and not children's due to her being the one that receives assistance.

## 2025-03-17 ENCOUNTER — PATIENT OUTREACH (OUTPATIENT)
Dept: SURGERY | Facility: CLINIC | Age: 25
End: 2025-03-17

## 2025-03-18 ENCOUNTER — PATIENT OUTREACH (OUTPATIENT)
Dept: SURGERY | Facility: CLINIC | Age: 25
End: 2025-03-18

## 2025-03-18 NOTE — PROGRESS NOTES
HC contacted by Ct, Ct requested for HC to call her regarding her appt tomorrow. HC rescheduled her appt for 10 the 21st and reminded ct of the documents that she will need to bring for her renewal. Ct states that she is also in the process of renewal for SNAP

## 2025-03-21 ENCOUNTER — PATIENT OUTREACH (OUTPATIENT)
Dept: SURGERY | Facility: CLINIC | Age: 25
End: 2025-03-21

## 2025-03-21 NOTE — PROGRESS NOTES
HC met with Ct for TBRA update in office. HC went over housing authorization with updated ct information. HC went over several housing policies including termination, noncompliance, fraud, grievance, indemnification, and carbon monoxide detectors. Ct understood, acknowledged and signed supporting housing releases and confirmation for "[employer]NET and landlord communication. HC went over housing goals with ct. HC went over TBRA calculations with Ct. Ct provided all supporting documentation.       Ct let HC know that she is having issues with [employer], [employer] sent bill statement to the wrong address and did not know what her actual due date was. Ct called [employer] and is now on a payment plan.Ct brother moved to [place], Ct states she visits him and Ct brother picks her up an takes her food shopping.     HC discussed case with HS, Ct provided most recent documentation.

## 2025-03-25 ENCOUNTER — PATIENT OUTREACH (OUTPATIENT)
Dept: SURGERY | Facility: CLINIC | Age: 25
End: 2025-03-25

## 2025-04-01 ENCOUNTER — PATIENT OUTREACH (OUTPATIENT)
Dept: SURGERY | Facility: CLINIC | Age: 25
End: 2025-04-01

## 2025-04-02 ENCOUNTER — PATIENT OUTREACH (OUTPATIENT)
Dept: SURGERY | Facility: CLINIC | Age: 25
End: 2025-04-02

## 2025-04-03 ENCOUNTER — PATIENT OUTREACH (OUTPATIENT)
Dept: SURGERY | Facility: CLINIC | Age: 25
End: 2025-04-03

## 2025-04-04 ENCOUNTER — PATIENT OUTREACH (OUTPATIENT)
Dept: SURGERY | Facility: CLINIC | Age: 25
End: 2025-04-04

## 2025-04-04 NOTE — PROGRESS NOTES
HC confirmed with CMM that Ct receives MCM - Housing services only (no ID care/pcp). HC added Ct to list of missing lab results for CD4 or Viral Load in CW.

## 2025-04-07 ENCOUNTER — PATIENT OUTREACH (OUTPATIENT)
Dept: SURGERY | Facility: CLINIC | Age: 25
End: 2025-04-07

## 2025-04-09 ENCOUNTER — PATIENT OUTREACH (OUTPATIENT)
Dept: SURGERY | Facility: CLINIC | Age: 25
End: 2025-04-09

## 2025-04-09 ENCOUNTER — TELEPHONE (OUTPATIENT)
Age: 25
End: 2025-04-09

## 2025-04-09 NOTE — TELEPHONE ENCOUNTER
Pt called to schedule surgical consult. Warm transfer to [facility] for scheduling assistance.

## 2025-04-10 ENCOUNTER — PATIENT OUTREACH (OUTPATIENT)
Dept: SURGERY | Facility: CLINIC | Age: 25
End: 2025-04-10

## 2025-04-14 ENCOUNTER — PATIENT OUTREACH (OUTPATIENT)
Dept: SURGERY | Facility: CLINIC | Age: 25
End: 2025-04-14

## 2025-04-16 ENCOUNTER — PATIENT OUTREACH (OUTPATIENT)
Dept: SURGERY | Facility: CLINIC | Age: 25
End: 2025-04-16

## 2025-04-17 ENCOUNTER — PATIENT OUTREACH (OUTPATIENT)
Dept: SURGERY | Facility: CLINIC | Age: 25
End: 2025-04-17

## 2025-04-17 NOTE — PROGRESS NOTES
HC corrected auth and re-submitted to HS for approval, HS let HC know that he will look over it. Ct is only due for income update.

## 2025-04-18 ENCOUNTER — PATIENT OUTREACH (OUTPATIENT)
Dept: SURGERY | Facility: CLINIC | Age: 25
End: 2025-04-18

## 2025-04-18 NOTE — PROGRESS NOTES
HC contacted Ct to let her know of rent amount, Ct answered and confirmed she wrote down rental amount.

## 2025-04-18 NOTE — PROGRESS NOTES
HC went over auth with HS, HS updated and approved auth with income update. Ct next income update scheduled for August 2025

## 2025-05-09 ENCOUNTER — TELEPHONE (OUTPATIENT)
Age: 25
End: 2025-05-09

## 2025-05-12 ENCOUNTER — TELEPHONE (OUTPATIENT)
Dept: BARIATRICS | Facility: CLINIC | Age: 25
End: 2025-05-12

## 2025-05-14 ENCOUNTER — PATIENT OUTREACH (OUTPATIENT)
Dept: SURGERY | Facility: CLINIC | Age: 25
End: 2025-05-14

## 2025-05-14 NOTE — PROGRESS NOTES
Discussion took place with KOURTNEY Fontana, KOURTNEY Santos let HC know that Ct was experiencing issues with landlord and those issues were resolved, included pool usage and leaking roof.

## 2025-05-16 ENCOUNTER — PATIENT OUTREACH (OUTPATIENT)
Dept: SURGERY | Facility: CLINIC | Age: 25
End: 2025-05-16

## 2025-05-16 NOTE — PROGRESS NOTES
KOURTNEY HALL DISCUSSED CT UPDATES WITH KOURTNEY ROSE. CT EXPERIENCED A LEAK IN THE ROOF OF HER UNIT THAT HAS SINCE BEEN RECTIFIED.

## 2025-05-19 NOTE — PROGRESS NOTES
HC completed June check requisition for rent and signed. HC sent to [employer] for processing and accounts payable.

## 2025-05-22 ENCOUNTER — PATIENT OUTREACH (OUTPATIENT)
Dept: SURGERY | Facility: CLINIC | Age: 25
End: 2025-05-22

## 2025-06-03 ENCOUNTER — PATIENT OUTREACH (OUTPATIENT)
Dept: SURGERY | Facility: CLINIC | Age: 25
End: 2025-06-03

## 2025-06-09 ENCOUNTER — PATIENT OUTREACH (OUTPATIENT)
Dept: SURGERY | Facility: CLINIC | Age: 25
End: 2025-06-09

## 2025-06-20 ENCOUNTER — PATIENT OUTREACH (OUTPATIENT)
Dept: SURGERY | Facility: CLINIC | Age: 25
End: 2025-06-20

## 2025-06-25 ENCOUNTER — PATIENT OUTREACH (OUTPATIENT)
Dept: SURGERY | Facility: CLINIC | Age: 25
End: 2025-06-25

## 2025-06-25 NOTE — PROGRESS NOTES
HC contacted by Ct, Ct called asking for dental assistance, Ct states that she was contacting her  and did not get a response. Ct states she is in a lot of pain. HC to contacted CM office for further assistance.

## 2025-06-26 ENCOUNTER — PATIENT OUTREACH (OUTPATIENT)
Dept: SURGERY | Facility: CLINIC | Age: 25
End: 2025-06-26

## 2025-06-26 NOTE — PROGRESS NOTES
HC contacted CM at [facility], CM was out office and contacted CMM at [facility]. HC explained Ct discomfort and need for dental assistance. HC provided contact information for Ct.     Discussion took place with HC Isa, Ct let HC know she is feeling pain due to her teeth and tried to contact office but, CM was out of town.     HC requested assistance for coverage however, HC was informed that service would have to be provided by June 30th and billed by July 1st for coverage. HC was informed and recommended for Ct to schedule appt and complete sliding fee scale at [facility] if no assistance available at [facility].

## 2025-06-26 NOTE — PROGRESS NOTES
KOURTNEY HALL DISCUSSED CT CASE UPDATES WITH KOURTNEY ROSE. CT REPORTS ONGOING DENTAL ISSUES, AND STATED THAT SHE HAS BEEN UNABLE TO CONTACT HER MCM AT [facility].    KOURTNEY HALL VERIFIED, THAT CT'S MCM WAS JAMES TY AT [facility]. KOURTNEY HALL F/U WITH JOSE VILLALOBOS AT [facility] TO RESOLVE THAT CT HAS BEEN ASSIGNED A NEW MCM.    KOURTNEY HALL PROVIDED CT WITH NEW MCM CONTACT INFO TO F/U ON DENTAL ASSISTANCE/REFERRALS.

## 2025-06-27 ENCOUNTER — PATIENT OUTREACH (OUTPATIENT)
Dept: SURGERY | Facility: CLINIC | Age: 25
End: 2025-06-27

## 2025-06-27 NOTE — PROGRESS NOTES
HC was contacted by ANGELA, CMM let HC know that a  at their office was able to assist Ct with her dental care needs. Dental office misunderstood and Ct is able to receive assistance.     Discussion took place with HC Isa, updates on Ct case.

## 2025-07-02 ENCOUNTER — PATIENT OUTREACH (OUTPATIENT)
Dept: SURGERY | Facility: CLINIC | Age: 25
End: 2025-07-02

## 2025-07-02 ENCOUNTER — PROCEDURE VISIT (OUTPATIENT)
Dept: OBGYN CLINIC | Facility: CLINIC | Age: 25
End: 2025-07-02

## 2025-07-02 VITALS — SYSTOLIC BLOOD PRESSURE: 124 MMHG | WEIGHT: 204.4 LBS | BODY MASS INDEX: 38.62 KG/M2 | DIASTOLIC BLOOD PRESSURE: 70 MMHG

## 2025-07-02 DIAGNOSIS — Z59.82 INABILITY TO ACQUIRE TRANSPORTATION: ICD-10-CM

## 2025-07-02 DIAGNOSIS — Z30.09 GENERAL COUNSELING AND ADVICE ON FEMALE CONTRACEPTION: ICD-10-CM

## 2025-07-02 DIAGNOSIS — Z30.430 ENCOUNTER FOR IUD INSERTION: Primary | ICD-10-CM

## 2025-07-02 PROCEDURE — 58300 INSERT INTRAUTERINE DEVICE: CPT | Performed by: NURSE PRACTITIONER

## 2025-07-02 SDOH — ECONOMIC STABILITY - TRANSPORTATION SECURITY: TRANSPORTATION INSECURITY: Z59.82

## 2025-07-02 NOTE — PATIENT INSTRUCTIONS
Return in 1 month for IUD string check  Schedule annual GYN exam and PAP  Call with needs or concerns

## 2025-07-02 NOTE — PROGRESS NOTES
HC discussed case KOURTNEY Santos, Ct was able to get dental assistance. HC let HC Tom know that Ct has not mentioned anything to HC about pool or landlord, [facility] also [facility]

## 2025-07-02 NOTE — PROGRESS NOTES
Name: Anatoliy Bailey      : 2000      MRN: [id number]  Encounter Provider: HETAL Banda  Encounter Date: 2025   Encounter department: [facility]  :  Assessment & Plan  Encounter for IUD insertion         General counseling and advice on female contraception       Plan  Return in 1 month for IUD string check  Schedule annual GYN exam and PAP  Call with needs or concerns  Pt verbalized understanding of all discussed.        History of Present Illness   HPI  Anatoliy Bailey is a 25 y.o. female who presents for Mirena iUD insertion  Pt was counseled on all forms of birth control and decided she wanted an IUD  Pt states she has not returned due to many life problems  Pt states she does not want more children in the near future and does want a longterm form of birth control  Pt states she does not have a partner and last sexual activity was 3 years ago  2021 was last WNL PAP    Safe and effective use of a Mirena IUD was provided  Risks and benefits of Mirena IUD were discussed       Review of Systems  .Pertinent items are note in the HPI         Objective   /70 (BP Location: Left arm, Patient Position: Sitting)   Wt 92.7 kg (204 lb 6.4 oz)   LMP 2025 (Exact Date)   BMI 38.62 kg/m²      Physical Exam  Constitutional:       Appearance: Normal appearance.     Eyes:      General:         Right eye: No discharge.         Left eye: No discharge.     Pulmonary:      Effort: Pulmonary effort is normal. No respiratory distress.     Musculoskeletal:         General: Normal range of motion.      Cervical back: Normal range of motion.     Skin:     General: Skin is warm and dry.     Neurological:      Mental Status: She is oriented to person, place, and time.     Psychiatric:         Mood and Affect: Mood normal.         Behavior: Behavior normal.         Thought Content: Thought content normal.   Negative cough or SOB  Vulva  negative lesions or erythema  Vagina normal mucosa  Cervix negative lesions, negative CMT  Uterus NSSC, NT  Adnexa NT, negative masses palpable    IUD Procedure    Date/Time: 7/2/2025 2:20 PM    Performed by: HETAL Banda  Authorized by: HETAL Banda    Other Assisting Provider: No    Verbal consent obtained?: Yes    Written consent obtained?: Yes    Risks and benefits: Risks, benefits and alternatives were discussed    Consent given by:  Patient  Time Out:     Time out: Immediately prior to the procedure a time out was called      Time out performed at:  7/2/2025 2:20 PM  Patient states understanding of procedure being performed: Yes    Patient's understanding of procedure matches consent: Yes    Procedure consent matches procedure scheduled: Yes    Relevant documents present and verified: Yes    Test results available and properly labeled: Yes    Required items: Required blood products, implants, devices and special equipment available    Patient identity confirmed:  Verbally with patient  Select procedure: IUD insertion    IUD Insertion:     Pelvic exam performed: yes      Negative urine pregnancy test: yes      Cervix cleaned and prepped: yes      Speculum placed in vagina: yes      Tenaculum applied to cervix: yes      IUD inserted with no complications: yes      Strings trimmed: yes      Uterus sounded: yes      Uterus sound depth (cm):  8.5    IUD type:  1 Intra Uterine Device Levonorgestrel 20 MCG/DAY  Post-procedure:     Patient tolerated procedure well: yes    Insertion Comments:      Return in 1 month for IUD string check

## 2025-07-03 ENCOUNTER — PATIENT OUTREACH (OUTPATIENT)
Dept: OBGYN CLINIC | Facility: CLINIC | Age: 25
End: 2025-07-03

## 2025-07-03 NOTE — PROGRESS NOTES
FAMILIA GEE spoke with 24 y/o-S-P3- Montenegrin speaking woman to address her needs. FAMILIA GEE introduced self and discussed the reason for the call. Pt resides with her 3 kids. Pt reported recently started working part time. Pt has MA, WIC, SNAP and housing. Pt denies any mental health or domestic violence hx. Pt main need is assistance to pay her gas bill.     Pt reported she applied for on track and is awaiting for their response. Pt was educated about [employer] program and information was provided via [employer] for day care assistance and to pay for the utilities. Pt also with question about getting chil support. FAMILIA GEE provided phone number for Domestic Relations and advice Pt to call for appointment to file a petition.    Pt denies any other concerns at this time. FAMILIA GEE explained her role and encouraged pt to call as needed.

## 2025-07-08 ENCOUNTER — PATIENT OUTREACH (OUTPATIENT)
Dept: SURGERY | Facility: CLINIC | Age: 25
End: 2025-07-08

## 2025-07-10 ENCOUNTER — PATIENT OUTREACH (OUTPATIENT)
Dept: SURGERY | Facility: CLINIC | Age: 25
End: 2025-07-10

## 2025-07-11 ENCOUNTER — PATIENT OUTREACH (OUTPATIENT)
Dept: SURGERY | Facility: CLINIC | Age: 25
End: 2025-07-11

## 2025-07-15 ENCOUNTER — PATIENT OUTREACH (OUTPATIENT)
Dept: SURGERY | Facility: CLINIC | Age: 25
End: 2025-07-15

## 2025-07-17 ENCOUNTER — PATIENT OUTREACH (OUTPATIENT)
Dept: SURGERY | Facility: CLINIC | Age: 25
End: 2025-07-17

## 2025-07-17 NOTE — PROGRESS NOTES
HC contacted by [facility], no updates on rental increase amount

## 2025-07-18 ENCOUNTER — PATIENT OUTREACH (OUTPATIENT)
Dept: OBGYN CLINIC | Facility: CLINIC | Age: 25
End: 2025-07-18

## 2025-07-18 ENCOUNTER — PATIENT OUTREACH (OUTPATIENT)
Dept: SURGERY | Facility: CLINIC | Age: 25
End: 2025-07-18

## 2025-07-18 NOTE — PROGRESS NOTES
FAMILIA GEE spoke with Pt for follow up. Pt reported she is recuperating from a surgery.  Pt reported she applied for [employer] and is awaiting for the approval as well as assistance to pay her UGI bill. Pt has food and denies other needs at this time. Pt will call FAMILIA GEE if assistance is not enough to look for other resources.

## 2025-07-18 NOTE — PROGRESS NOTES
HC completed August check requisition for rent and signed. HC sent to [employer] for processing and accounts payable.

## 2025-07-22 ENCOUNTER — PATIENT OUTREACH (OUTPATIENT)
Dept: SURGERY | Facility: CLINIC | Age: 25
End: 2025-07-22

## 2025-07-22 NOTE — PROGRESS NOTES
This HC was contacted in regards to Ct's survey code. HC confirmed screenshot receipt with CMM and code used for survey. HC contacted Ct for follow up; no response and HC left vm and texted ct.

## 2025-07-24 ENCOUNTER — TELEPHONE (OUTPATIENT)
Dept: OBGYN CLINIC | Facility: CLINIC | Age: 25
End: 2025-07-24

## 2025-07-24 NOTE — TELEPHONE ENCOUNTER
Patient came to office because she would like to speak to a provider. States she has not stopped bleeding as if she was on her menses since mirena insertion. Patient would like to know if this is normal.

## 2025-07-28 ENCOUNTER — PATIENT OUTREACH (OUTPATIENT)
Dept: SURGERY | Facility: CLINIC | Age: 25
End: 2025-07-28

## 2025-07-29 ENCOUNTER — PATIENT OUTREACH (OUTPATIENT)
Dept: SURGERY | Facility: CLINIC | Age: 25
End: 2025-07-29

## 2025-07-30 ENCOUNTER — TELEPHONE (OUTPATIENT)
Dept: OBGYN CLINIC | Facility: CLINIC | Age: 25
End: 2025-07-30

## 2025-07-31 ENCOUNTER — PATIENT OUTREACH (OUTPATIENT)
Dept: SURGERY | Facility: CLINIC | Age: 25
End: 2025-07-31

## 2025-08-01 ENCOUNTER — PATIENT OUTREACH (OUTPATIENT)
Dept: SURGERY | Facility: CLINIC | Age: 25
End: 2025-08-01

## 2025-08-06 ENCOUNTER — PATIENT OUTREACH (OUTPATIENT)
Dept: SURGERY | Facility: CLINIC | Age: 25
End: 2025-08-06

## 2025-08-11 ENCOUNTER — PATIENT OUTREACH (OUTPATIENT)
Dept: SURGERY | Facility: CLINIC | Age: 25
End: 2025-08-11

## 2025-08-12 ENCOUNTER — PATIENT OUTREACH (OUTPATIENT)
Dept: SURGERY | Facility: CLINIC | Age: 25
End: 2025-08-12

## 2025-08-13 ENCOUNTER — PATIENT OUTREACH (OUTPATIENT)
Dept: OBGYN CLINIC | Facility: CLINIC | Age: 25
End: 2025-08-13

## 2025-08-15 ENCOUNTER — TELEPHONE (OUTPATIENT)
Dept: OBGYN CLINIC | Facility: CLINIC | Age: 25
End: 2025-08-15

## 2025-08-18 ENCOUNTER — PATIENT OUTREACH (OUTPATIENT)
Dept: SURGERY | Facility: CLINIC | Age: 25
End: 2025-08-18

## 2025-08-20 ENCOUNTER — PATIENT OUTREACH (OUTPATIENT)
Dept: SURGERY | Facility: CLINIC | Age: 25
End: 2025-08-20

## 2025-08-21 ENCOUNTER — PATIENT OUTREACH (OUTPATIENT)
Dept: SURGERY | Facility: CLINIC | Age: 25
End: 2025-08-21

## 2025-08-22 ENCOUNTER — PATIENT OUTREACH (OUTPATIENT)
Dept: SURGERY | Facility: CLINIC | Age: 25
End: 2025-08-22